# Patient Record
Sex: FEMALE | Race: WHITE | NOT HISPANIC OR LATINO | Employment: UNEMPLOYED | URBAN - NONMETROPOLITAN AREA
[De-identification: names, ages, dates, MRNs, and addresses within clinical notes are randomized per-mention and may not be internally consistent; named-entity substitution may affect disease eponyms.]

---

## 2018-02-14 ENCOUNTER — OFFICE VISIT (OUTPATIENT)
Dept: FAMILY MEDICINE CLINIC | Facility: CLINIC | Age: 53
End: 2018-02-14

## 2018-02-14 VITALS
RESPIRATION RATE: 18 BRPM | HEART RATE: 80 BPM | DIASTOLIC BLOOD PRESSURE: 82 MMHG | SYSTOLIC BLOOD PRESSURE: 118 MMHG | TEMPERATURE: 97.8 F | BODY MASS INDEX: 28.51 KG/M2 | HEIGHT: 64 IN | WEIGHT: 167 LBS | OXYGEN SATURATION: 98 %

## 2018-02-14 DIAGNOSIS — F41.9 ANXIETY: ICD-10-CM

## 2018-02-14 DIAGNOSIS — Z12.39 SCREENING FOR BREAST CANCER: ICD-10-CM

## 2018-02-14 DIAGNOSIS — F33.1 MODERATE EPISODE OF RECURRENT MAJOR DEPRESSIVE DISORDER (HCC): Primary | ICD-10-CM

## 2018-02-14 DIAGNOSIS — R53.83 FATIGUE, UNSPECIFIED TYPE: ICD-10-CM

## 2018-02-14 DIAGNOSIS — Z76.89 ENCOUNTER TO ESTABLISH CARE: ICD-10-CM

## 2018-02-14 DIAGNOSIS — Z13.220 SCREENING FOR HYPERCHOLESTEROLEMIA: ICD-10-CM

## 2018-02-14 DIAGNOSIS — Z12.4 SCREENING FOR CERVICAL CANCER: ICD-10-CM

## 2018-02-14 PROCEDURE — 99204 OFFICE O/P NEW MOD 45 MIN: CPT | Performed by: NURSE PRACTITIONER

## 2018-02-14 RX ORDER — BUSPIRONE HYDROCHLORIDE 5 MG/1
5 TABLET ORAL 3 TIMES DAILY
Qty: 90 TABLET | Refills: 2 | Status: SHIPPED | OUTPATIENT
Start: 2018-02-14 | End: 2018-08-07 | Stop reason: SDUPTHER

## 2018-02-14 RX ORDER — ESCITALOPRAM OXALATE 10 MG/1
10 TABLET ORAL DAILY
Qty: 30 TABLET | Refills: 1 | Status: SHIPPED | OUTPATIENT
Start: 2018-02-14 | End: 2018-04-09 | Stop reason: SDUPTHER

## 2018-02-15 ENCOUNTER — APPOINTMENT (OUTPATIENT)
Dept: LAB | Facility: HOSPITAL | Age: 53
End: 2018-02-15

## 2018-02-15 LAB
ALBUMIN SERPL-MCNC: 4.4 G/DL (ref 3.4–4.8)
ALBUMIN/GLOB SERPL: 1.4 G/DL (ref 1.1–1.8)
ALP SERPL-CCNC: 108 U/L (ref 38–126)
ALT SERPL W P-5'-P-CCNC: 28 U/L (ref 9–52)
ANION GAP SERPL CALCULATED.3IONS-SCNC: 14 MMOL/L (ref 5–15)
ARTICHOKE IGE QN: 95 MG/DL (ref 1–129)
AST SERPL-CCNC: 22 U/L (ref 14–36)
BASOPHILS # BLD AUTO: 0.01 10*3/MM3 (ref 0–0.2)
BASOPHILS NFR BLD AUTO: 0.2 % (ref 0–2)
BILIRUB SERPL-MCNC: 0.6 MG/DL (ref 0.2–1.3)
BUN BLD-MCNC: 13 MG/DL (ref 7–21)
BUN/CREAT SERPL: 23.6 (ref 7–25)
CALCIUM SPEC-SCNC: 9.8 MG/DL (ref 8.4–10.2)
CHLORIDE SERPL-SCNC: 102 MMOL/L (ref 95–110)
CHOLEST SERPL-MCNC: 172 MG/DL (ref 0–199)
CO2 SERPL-SCNC: 24 MMOL/L (ref 22–31)
CREAT BLD-MCNC: 0.55 MG/DL (ref 0.5–1)
DEPRECATED RDW RBC AUTO: 41.2 FL (ref 36.4–46.3)
EOSINOPHIL # BLD AUTO: 0.12 10*3/MM3 (ref 0–0.7)
EOSINOPHIL NFR BLD AUTO: 2.1 % (ref 0–7)
ERYTHROCYTE [DISTWIDTH] IN BLOOD BY AUTOMATED COUNT: 13 % (ref 11.5–14.5)
GFR SERPL CREATININE-BSD FRML MDRD: 116 ML/MIN/1.73 (ref 51–120)
GLOBULIN UR ELPH-MCNC: 3.1 GM/DL (ref 2.3–3.5)
GLUCOSE BLD-MCNC: 108 MG/DL (ref 60–100)
HCT VFR BLD AUTO: 44.1 % (ref 35–45)
HDLC SERPL-MCNC: 61 MG/DL (ref 60–200)
HGB BLD-MCNC: 15.3 G/DL (ref 12–15.5)
IMM GRANULOCYTES # BLD: 0.01 10*3/MM3 (ref 0–0.02)
IMM GRANULOCYTES NFR BLD: 0.2 % (ref 0–0.5)
LDLC/HDLC SERPL: 1.59 {RATIO} (ref 0–3.22)
LYMPHOCYTES # BLD AUTO: 1.94 10*3/MM3 (ref 0.6–4.2)
LYMPHOCYTES NFR BLD AUTO: 33.4 % (ref 10–50)
MCH RBC QN AUTO: 30.4 PG (ref 26.5–34)
MCHC RBC AUTO-ENTMCNC: 34.7 G/DL (ref 31.4–36)
MCV RBC AUTO: 87.5 FL (ref 80–98)
MONOCYTES # BLD AUTO: 0.27 10*3/MM3 (ref 0–0.9)
MONOCYTES NFR BLD AUTO: 4.6 % (ref 0–12)
NEUTROPHILS # BLD AUTO: 3.46 10*3/MM3 (ref 2–8.6)
NEUTROPHILS NFR BLD AUTO: 59.5 % (ref 37–80)
PLATELET # BLD AUTO: 302 10*3/MM3 (ref 150–450)
PMV BLD AUTO: 10.1 FL (ref 8–12)
POTASSIUM BLD-SCNC: 4.3 MMOL/L (ref 3.5–5.1)
PROT SERPL-MCNC: 7.5 G/DL (ref 6.3–8.6)
RBC # BLD AUTO: 5.04 10*6/MM3 (ref 3.77–5.16)
SODIUM BLD-SCNC: 140 MMOL/L (ref 137–145)
TRIGL SERPL-MCNC: 70 MG/DL (ref 20–199)
TSH SERPL DL<=0.05 MIU/L-ACNC: 0.96 MIU/ML (ref 0.46–4.68)
WBC NRBC COR # BLD: 5.81 10*3/MM3 (ref 3.2–9.8)

## 2018-02-15 PROCEDURE — 80050 GENERAL HEALTH PANEL: CPT | Performed by: NURSE PRACTITIONER

## 2018-02-15 PROCEDURE — 80061 LIPID PANEL: CPT | Performed by: NURSE PRACTITIONER

## 2018-02-16 ENCOUNTER — TELEPHONE (OUTPATIENT)
Dept: FAMILY MEDICINE CLINIC | Facility: CLINIC | Age: 53
End: 2018-02-16

## 2018-02-16 NOTE — TELEPHONE ENCOUNTER
Per DHARMESH Méndez, Natividad Montgomery was called and given recent lab results.  Continue current meds and follow up as planned or sooner if any problems.

## 2018-02-27 ENCOUNTER — OFFICE VISIT (OUTPATIENT)
Dept: OBSTETRICS AND GYNECOLOGY | Facility: CLINIC | Age: 53
End: 2018-02-27

## 2018-02-27 VITALS
BODY MASS INDEX: 39.11 KG/M2 | WEIGHT: 169 LBS | HEIGHT: 55 IN | DIASTOLIC BLOOD PRESSURE: 70 MMHG | SYSTOLIC BLOOD PRESSURE: 124 MMHG

## 2018-02-27 DIAGNOSIS — Z01.419 WELL WOMAN EXAM WITH ROUTINE GYNECOLOGICAL EXAM: Primary | ICD-10-CM

## 2018-02-27 PROCEDURE — 88142 CYTOPATH C/V THIN LAYER: CPT | Performed by: OBSTETRICS & GYNECOLOGY

## 2018-02-27 PROCEDURE — 87624 HPV HI-RISK TYP POOLED RSLT: CPT | Performed by: OBSTETRICS & GYNECOLOGY

## 2018-02-27 PROCEDURE — 99386 PREV VISIT NEW AGE 40-64: CPT | Performed by: OBSTETRICS & GYNECOLOGY

## 2018-02-27 RX ORDER — OMEPRAZOLE 40 MG/1
40 CAPSULE, DELAYED RELEASE ORAL
COMMUNITY
End: 2018-06-01 | Stop reason: SDUPTHER

## 2018-02-27 RX ORDER — VALACYCLOVIR HYDROCHLORIDE 1 G/1
TABLET, FILM COATED ORAL
COMMUNITY
End: 2018-06-05

## 2018-02-27 NOTE — PROGRESS NOTES
Subjective   Natividad Montgomery is a 52 y.o. female.     HPI Comments: Patient presents today for annual gynecologic exam with mammogram  Last annual was 3 years ago , that is the last time she had a pap or mammo and these have all been normal except a spot on her left breast that required f/u imaging.  No Bx required.  LMP: 1999  Supracervical hysterectomy  No longer having menopause related symptoms, had used estradiol previously    Gynecologic Exam   The patient's pertinent negatives include no vaginal discharge. Pertinent negatives include no abdominal pain.       The following portions of the patient's history were reviewed and updated as appropriate: allergies, current medications, past family history, past medical history, past social history, past surgical history and problem list.      Review of Systems   Constitutional: Negative for activity change, appetite change and unexpected weight change.   Respiratory: Negative for cough and shortness of breath.    Cardiovascular: Negative for chest pain and palpitations.   Gastrointestinal: Negative for abdominal pain.   Genitourinary: Negative for menstrual problem and vaginal discharge.       Objective   Physical Exam   Constitutional: She is oriented to person, place, and time. She appears well-developed and well-nourished. No distress.   HENT:   Head: Normocephalic and atraumatic.   Right Ear: External ear normal.   Left Ear: External ear normal.   Nose: Nose normal.   Mouth/Throat: Oropharynx is clear and moist. No oropharyngeal exudate.   Eyes: Conjunctivae and EOM are normal. Pupils are equal, round, and reactive to light. Right eye exhibits no discharge. Left eye exhibits no discharge.   Neck: Normal range of motion. Neck supple. No tracheal deviation present. No thyromegaly present.   Cardiovascular: Normal rate, regular rhythm, normal heart sounds and intact distal pulses.  Exam reveals no gallop and no friction rub.    No murmur heard.  Pulmonary/Chest:  Effort normal and breath sounds normal. No respiratory distress. She has no wheezes. She has no rales. She exhibits no mass, no tenderness, no laceration, no deformity and no retraction. Right breast exhibits no inverted nipple, no mass, no nipple discharge, no skin change and no tenderness. Left breast exhibits no inverted nipple, no mass, no nipple discharge, no skin change and no tenderness. Breasts are symmetrical. There is no breast swelling.   Abdominal: Soft. She exhibits no distension and no mass. There is no tenderness. There is no rebound and no guarding. No hernia.   Genitourinary: Vagina normal. No breast tenderness, discharge or bleeding. Pelvic exam was performed with patient supine. No labial fusion. There is no rash, tenderness, lesion or injury on the right labia. There is no rash, tenderness, lesion or injury on the left labia. Cervix exhibits no motion tenderness, no discharge and no friability. Right adnexum displays no mass, no tenderness and no fullness. Left adnexum displays no mass, no tenderness and no fullness. No erythema, tenderness or bleeding in the vagina. No foreign body in the vagina. No signs of injury around the vagina. No vaginal discharge found.   Genitourinary Comments: Pap collected  Uterus is surgically absent  Mild atrophic vaginitis with slight narrowing of vagina   Musculoskeletal: Normal range of motion. She exhibits no edema or deformity.   Neurological: She is alert and oriented to person, place, and time. No cranial nerve deficit. Coordination normal.   Skin: Skin is warm and dry. No rash noted. She is not diaphoretic. No erythema. No pallor.   Psychiatric: She has a normal mood and affect. Her behavior is normal. Judgment and thought content normal.   Vitals reviewed.      Assessment/Plan   Natividad was seen today for gynecologic exam.    Diagnoses and all orders for this visit:    Well woman exam with routine gynecological exam  -     Liquid-based Pap Smear,  Screening       Pap collected  F/u 1 year and PRN

## 2018-03-02 ENCOUNTER — TELEPHONE (OUTPATIENT)
Dept: FAMILY MEDICINE CLINIC | Facility: CLINIC | Age: 53
End: 2018-03-02

## 2018-03-02 LAB
LAB AP CASE REPORT: NORMAL
LAB AP GYN ADDITIONAL INFORMATION: NORMAL
Lab: NORMAL
PATH INTERP SPEC-IMP: NORMAL
STAT OF ADQ CVX/VAG CYTO-IMP: NORMAL

## 2018-03-02 NOTE — TELEPHONE ENCOUNTER
Per DHARMESH Méndez, Natividad Montgomery was called and given recent mammogram results.  Continue current meds and follow up as planned or sooner if any problems.

## 2018-03-06 LAB — HPV I/H RISK 4 DNA CVX QL PROBE+SIG AMP: NEGATIVE

## 2018-03-14 ENCOUNTER — OFFICE VISIT (OUTPATIENT)
Dept: FAMILY MEDICINE CLINIC | Facility: CLINIC | Age: 53
End: 2018-03-14

## 2018-03-14 VITALS
RESPIRATION RATE: 20 BRPM | HEIGHT: 64 IN | HEART RATE: 75 BPM | SYSTOLIC BLOOD PRESSURE: 118 MMHG | OXYGEN SATURATION: 98 % | TEMPERATURE: 98 F | WEIGHT: 170.2 LBS | DIASTOLIC BLOOD PRESSURE: 80 MMHG | BODY MASS INDEX: 29.06 KG/M2

## 2018-03-14 DIAGNOSIS — F32.A ANXIETY AND DEPRESSION: Primary | ICD-10-CM

## 2018-03-14 DIAGNOSIS — K64.0 GRADE I HEMORRHOIDS: ICD-10-CM

## 2018-03-14 DIAGNOSIS — F41.9 ANXIETY AND DEPRESSION: Primary | ICD-10-CM

## 2018-03-14 PROCEDURE — 99214 OFFICE O/P EST MOD 30 MIN: CPT | Performed by: NURSE PRACTITIONER

## 2018-03-14 NOTE — PROGRESS NOTES
Subjective   Natividad Montgomery is a 52 y.o. female.   Four week recheck of depression and anxiety after being placed on Lexapro and Buspar.  Reports she is doing very well on both medications.  Is only having to take her Buspar twice a day.  Has had hemorrhoids intermittently for the last several years but over the past few weeks has been doing a lot of heavy lifting has noticed they have been more irritated.  Has been using hydrocortisone cream and suppositories but still complains of some rectal pain.    Depression   Visit Type: follow-up  Patient presents with the following symptoms: depressed mood and nervousness/anxiety.  Frequency of symptoms: occasionally   Severity: moderate   Sleep quality: good  Nighttime awakenings: occasional  Compliance with medications:  %        Rectal Pain   This is a chronic problem. The current episode started more than 1 year ago. The problem occurs intermittently. The problem has been waxing and waning. Exacerbated by: heavy lifiting. Treatments tried: hydrocortisone cream and suppositories  The treatment provided no relief.        The following portions of the patient's history were reviewed and updated as appropriate: allergies, current medications, past family history, past medical history, past social history, past surgical history and problem list.    Review of Systems   Constitutional: Negative.    Eyes: Negative.    Respiratory: Negative.    Gastrointestinal: Positive for rectal pain.   Genitourinary: Negative.    Musculoskeletal: Negative.    Skin: Negative.    Neurological: Negative.    Psychiatric/Behavioral: The patient is nervous/anxious.        Objective   Physical Exam   Constitutional: She is oriented to person, place, and time. She appears well-developed and well-nourished.   HENT:   Head: Normocephalic.   Right Ear: External ear normal.   Left Ear: External ear normal.   Mouth/Throat: Oropharynx is clear and moist.   Eyes: EOM are normal. Pupils are equal,  round, and reactive to light.   Neck: Normal range of motion. Neck supple.   Cardiovascular: Normal rate, regular rhythm and normal heart sounds.    Pulmonary/Chest: Effort normal and breath sounds normal.   Abdominal: Soft. Bowel sounds are normal.   Genitourinary:         Musculoskeletal: Normal range of motion.   Neurological: She is alert and oriented to person, place, and time.   Skin: Skin is warm and dry. Capillary refill takes less than 2 seconds.   Psychiatric: She has a normal mood and affect. Her behavior is normal. Judgment and thought content normal.   Nursing note and vitals reviewed.      Assessment/Plan   Problems Addressed this Visit     None      Visit Diagnoses     Anxiety and depression    -  Primary    Grade I hemorrhoids        Relevant Medications    hydrocortisone-pramoxine (PROCTOFOAM HC) 1-1 % rectal foam        1.  Anxiety and depression:  Continue on Lexapro as previously prescribed  Continue on BuSpar as previously prescribed  Schedule follow-up appointment with this office in 6 months for recheck    2.  Grade 1 hemorrhoids:  Begin Proctofoam HC 1% rectal foam as prescribed to be used one applicator nightly for 5 days  Encouraged to resume use of stool softener over-the-counter according to package directions  Encouraged increase fluids to help promote hydration and easy passage of stool  Encouraged to schedule media appointment with this office should any rectal bleeding occur        This document has been electronically signed by DHARMESH Garza on March 14, 2018 2:19 PM

## 2018-04-09 DIAGNOSIS — F33.1 MODERATE EPISODE OF RECURRENT MAJOR DEPRESSIVE DISORDER (HCC): ICD-10-CM

## 2018-04-09 RX ORDER — ESCITALOPRAM OXALATE 10 MG/1
10 TABLET ORAL DAILY
Qty: 30 TABLET | Refills: 5 | Status: SHIPPED | OUTPATIENT
Start: 2018-04-09 | End: 2018-04-10 | Stop reason: SDUPTHER

## 2018-04-09 RX ORDER — ESCITALOPRAM OXALATE 10 MG/1
10 TABLET ORAL DAILY
Qty: 30 TABLET | Refills: 1 | Status: CANCELLED | OUTPATIENT
Start: 2018-04-09

## 2018-04-10 DIAGNOSIS — F33.1 MODERATE EPISODE OF RECURRENT MAJOR DEPRESSIVE DISORDER (HCC): ICD-10-CM

## 2018-04-10 RX ORDER — ESCITALOPRAM OXALATE 10 MG/1
10 TABLET ORAL DAILY
Qty: 30 TABLET | Refills: 1 | Status: SHIPPED | OUTPATIENT
Start: 2018-04-10 | End: 2018-08-15

## 2018-04-10 RX ORDER — ESCITALOPRAM OXALATE 10 MG/1
10 TABLET ORAL DAILY
Qty: 30 TABLET | Refills: 5 | Status: SHIPPED | OUTPATIENT
Start: 2018-04-10 | End: 2018-06-01 | Stop reason: SDUPTHER

## 2018-06-01 ENCOUNTER — OFFICE VISIT (OUTPATIENT)
Dept: FAMILY MEDICINE CLINIC | Facility: CLINIC | Age: 53
End: 2018-06-01

## 2018-06-01 VITALS
WEIGHT: 171.4 LBS | HEIGHT: 64 IN | DIASTOLIC BLOOD PRESSURE: 80 MMHG | SYSTOLIC BLOOD PRESSURE: 118 MMHG | BODY MASS INDEX: 29.26 KG/M2

## 2018-06-01 DIAGNOSIS — M25.861 CYST OF RIGHT KNEE JOINT: Primary | ICD-10-CM

## 2018-06-01 DIAGNOSIS — K21.9 GASTROESOPHAGEAL REFLUX DISEASE WITHOUT ESOPHAGITIS: ICD-10-CM

## 2018-06-01 PROCEDURE — 99214 OFFICE O/P EST MOD 30 MIN: CPT | Performed by: NURSE PRACTITIONER

## 2018-06-01 RX ORDER — OMEPRAZOLE 40 MG/1
40 CAPSULE, DELAYED RELEASE ORAL DAILY
Qty: 90 CAPSULE | Refills: 3 | Status: SHIPPED | OUTPATIENT
Start: 2018-06-01 | End: 2019-05-09 | Stop reason: SDUPTHER

## 2018-06-01 NOTE — PROGRESS NOTES
"Subjective   Natividad Montgomery is a 53 y.o. female.   Complains of right knee pain.  Onset of symptoms 2 weeks ago and now has noticed a \"knot on my knee.\"  Also complains of increasing reflux over the last several years.  Has taken Prilosec in the past but her insurance stopped paying for it.  Would like to try it again.      Knee Pain    The incident occurred more than 1 week ago. The incident occurred at home. There was no injury mechanism. The pain is present in the right knee. The pain is at a severity of 0/10. The patient is experiencing no pain. The pain has been fluctuating since onset. Pertinent negatives include no inability to bear weight. Nothing aggravates the symptoms. She has tried nothing for the symptoms. The treatment provided no relief.   Heartburn   She complains of globus sensation, heartburn and water brash. This is a chronic problem. The current episode started more than 1 year ago. The problem occurs frequently. The problem has been waxing and waning. The heartburn duration is several minutes. The heartburn is located in the substernum. The heartburn is of moderate intensity. The heartburn wakes her from sleep. The heartburn doesn't change with position. The symptoms are aggravated by certain foods and lying down. Pertinent negatives include no orthopnea. Risk factors include lack of exercise and smoking/tobacco exposure. She has tried a PPI and an antacid for the symptoms. The treatment provided moderate relief.        The following portions of the patient's history were reviewed and updated as appropriate: allergies, current medications, past family history, past medical history, past social history, past surgical history and problem list.    Review of Systems   Constitutional: Negative.    Respiratory: Negative.    Cardiovascular: Negative.    Gastrointestinal: Positive for heartburn.        Reflux   Genitourinary: Negative.    Musculoskeletal: Negative.         Knot on right knee   Skin: " Negative.        Objective   Physical Exam   Constitutional: She is oriented to person, place, and time. She appears well-developed and well-nourished.   HENT:   Head: Normocephalic.   Right Ear: External ear normal.   Left Ear: External ear normal.   Eyes: Pupils are equal, round, and reactive to light.   Neck: Normal range of motion.   Cardiovascular: Normal rate, regular rhythm and normal heart sounds.    Pulmonary/Chest: Effort normal and breath sounds normal.   Abdominal: Soft. Bowel sounds are normal.   Musculoskeletal: Normal range of motion. She exhibits no edema or deformity.        Legs:  Neurological: She is alert and oriented to person, place, and time.   Skin: Skin is warm. Capillary refill takes less than 2 seconds.   Psychiatric: She has a normal mood and affect. Her behavior is normal.   Nursing note and vitals reviewed.      Assessment/Plan   Problems Addressed this Visit     None      Visit Diagnoses     Cyst of right knee joint    -  Primary    Relevant Orders    Ambulatory Referral to Orthopedic Surgery    Gastroesophageal reflux disease without esophagitis        Relevant Medications    omeprazole (priLOSEC) 40 MG capsule        1.  Cyst of right knee joint:  Ambulatory referral placed to orthopedics and will call to schedule an apportionment  Encouraged to elevate extremity when not ambulating     2.  GERD without esophagitis:  Begin Prilosec 40 mg as prescribed to be taken one capsule by mouth daily  Encouraged to reduce fried, fatty, greasy and spicy food in diet to help reduce GI discomfort  Encouraged to elevate the head of her bed 15 degrees to reduce nighttime symptoms  Discussed possibility of referral to gastroenterology if no improvement in symptoms     Continue on current medications as previously prescribed   Schedule follow-up appointment with this office in three months for recheck         This document has been electronically signed by DHARMESH Garza on June 21, 2018 12:21 PM

## 2018-06-04 ENCOUNTER — TELEPHONE (OUTPATIENT)
Dept: FAMILY MEDICINE CLINIC | Facility: CLINIC | Age: 53
End: 2018-06-04

## 2018-06-04 NOTE — TELEPHONE ENCOUNTER
Per DHARMESH Méndez, Natividad Montgomery was called and given recent XRay results. Patient states she has appt. With orthopedics on 06/05/18.Continue current meds and follow up as planned or sooner if any problems.

## 2018-06-05 ENCOUNTER — OFFICE VISIT (OUTPATIENT)
Dept: ORTHOPEDIC SURGERY | Facility: CLINIC | Age: 53
End: 2018-06-05

## 2018-06-05 VITALS — WEIGHT: 169.4 LBS | HEIGHT: 64 IN | BODY MASS INDEX: 28.92 KG/M2

## 2018-06-05 DIAGNOSIS — M25.861 CYST OF RIGHT KNEE JOINT: Primary | ICD-10-CM

## 2018-06-05 PROBLEM — M25.561 RIGHT KNEE PAIN: Status: ACTIVE | Noted: 2018-06-05

## 2018-06-05 PROCEDURE — 20610 DRAIN/INJ JOINT/BURSA W/O US: CPT | Performed by: NURSE PRACTITIONER

## 2018-06-05 PROCEDURE — 99214 OFFICE O/P EST MOD 30 MIN: CPT | Performed by: NURSE PRACTITIONER

## 2018-06-05 RX ADMIN — LIDOCAINE HYDROCHLORIDE 1 ML: 10 INJECTION, SOLUTION INFILTRATION; PERINEURAL at 13:49

## 2018-06-05 RX ADMIN — TRIAMCINOLONE ACETONIDE 40 MG: 40 INJECTION, SUSPENSION INTRA-ARTICULAR; INTRAMUSCULAR at 13:49

## 2018-06-05 NOTE — PROGRESS NOTES
"Natividad Montgomery is a 53 y.o. female   Primary provider:  No Known Provider       Chief Complaint   Patient presents with   • Right Knee - Pain       HISTORY OF PRESENT ILLNESS:     53-year-old female patient presents to office for evaluation of mass to right anterior knee. Mass has been present for several months and has grown larger in size. No know injury. Patient denies any pain, but states the cyst is unsightly and it bothers her being there. Patient was evaluated by primary care provider, DHARMESH Méndez, on 6/1/2018 with x-rays done of the right knee and referred to orthopedics. The patient has not tried any treatments for the mass. Nothing worsens her symptoms.       Pain   This is a chronic problem. The current episode started more than 1 month ago (several months ago). The problem occurs constantly. The problem has been gradually worsening. Associated symptoms include joint swelling. Associated symptoms comments: Swelling. Nothing aggravates the symptoms. She has tried nothing for the symptoms.        CONCURRENT MEDICAL HISTORY:    Past Medical History:   Diagnosis Date   • Allergic    • Anxiety    • Bronchitis    • Colon polyp    • Depression    • GERD (gastroesophageal reflux disease)    • Headache    • Strep throat    • Urinary tract infection        Allergies   Allergen Reactions   • Latex Itching   • Sulfa Antibiotics Other (See Comments)     \"Run a high fever\"         Current Outpatient Prescriptions:   •  busPIRone (BUSPAR) 5 MG tablet, Take 1 tablet by mouth 3 (Three) Times a Day., Disp: 90 tablet, Rfl: 2  •  Docusate Sodium (COLACE PO), Take  by mouth., Disp: , Rfl:   •  escitalopram (LEXAPRO) 10 MG tablet, TAKE 1 TABLET BY MOUTH DAILY., Disp: 30 tablet, Rfl: 1  •  Fexofenadine HCl (ALLEGRA PO), Take  by mouth., Disp: , Rfl:   •  IBUPROFEN PO, Take  by mouth., Disp: , Rfl:   •  omeprazole (priLOSEC) 40 MG capsule, Take 1 capsule by mouth Daily., Disp: 90 capsule, Rfl: 3    Past Surgical History: " "  Procedure Laterality Date   •  SECTION     • COLONOSCOPY     • PARTIAL HYSTERECTOMY     • SHOULDER SURGERY      right shoulder   • TONSILLECTOMY     • VAGINAL DILATATION      age 15       Family History   Problem Relation Age of Onset   • Heart disease Mother    • Hypertension Mother    • Lung cancer Father    • Hypertension Father    • Thyroid disease Sister    • Hyperlipidemia Sister    • Hypertension Sister    • Thyroid disease Brother    • Hypertension Brother    • Hyperlipidemia Brother    • No Known Problems Daughter    • Heart failure Maternal Grandmother    • Heart attack Maternal Grandfather    • Heart disease Paternal Grandmother    • No Known Problems Paternal Grandfather    • Hypertension Sister    • Hypertension Sister    • Deep vein thrombosis Other    • Diabetes Other    • Cancer Other        Social History     Social History   • Marital status: Single     Spouse name: N/A   • Number of children: N/A   • Years of education: N/A     Occupational History   • Not on file.     Social History Main Topics   • Smoking status: Current Every Day Smoker     Packs/day: 0.10     Types: Cigarettes   • Smokeless tobacco: Never Used   • Alcohol use Yes      Comment: occassionally   • Drug use: No   • Sexual activity: Not Currently     Birth control/ protection: Surgical     Other Topics Concern   • Not on file     Social History Narrative   • No narrative on file        Review of Systems   Constitutional: Negative.    HENT: Negative.    Eyes: Negative.    Respiratory: Negative.    Cardiovascular: Negative.    Gastrointestinal: Negative.    Endocrine: Negative.    Genitourinary: Negative.    Musculoskeletal: Positive for joint swelling.        Mass to right anterior knee.    Skin: Negative.    Allergic/Immunologic: Negative.    Neurological: Negative.    Hematological: Negative.    Psychiatric/Behavioral: Negative.        PHYSICAL EXAMINATION:       Ht 162.6 cm (64\")   Wt 76.8 kg (169 lb 6.4 oz)   " [facility] 01/01/1999 (Approximate)   BMI 29.08 kg/m²     Physical Exam   Constitutional: She is oriented to person, place, and time. Vital signs are normal. She appears well-developed and well-nourished. She is active and cooperative. She does not appear ill. No distress.   HENT:   Head: Normocephalic.   Pulmonary/Chest: Effort normal. No respiratory distress.   Abdominal: Soft. She exhibits no distension.   Musculoskeletal: She exhibits edema (Mass (cyst) right anterior knee). She exhibits no tenderness or deformity.        Right knee: She exhibits no effusion.        Left knee: She exhibits no effusion.   Neurological: She is alert and oriented to person, place, and time. GCS eye subscore is 4. GCS verbal subscore is 5. GCS motor subscore is 6.   Skin: Skin is warm, dry and intact. Capillary refill takes less than 2 seconds. No erythema.   Psychiatric: She has a normal mood and affect. Her speech is normal and behavior is normal. Judgment and thought content normal. Cognition and memory are normal.   Vitals reviewed.      GAIT:     [x]  Normal  []  Antalgic    Assistive device: [x]  None  []  Walker     []  Crutches  []  Cane     []  Wheelchair  []  Stretcher    Right Knee Exam     Tenderness   The patient is experiencing no tenderness.         Range of Motion   The patient has normal right knee ROM.    Muscle Strength     The patient has normal right knee strength.    Tests   Olga:  Medial - negative Lateral - negative  Varus: negative  Valgus: negative    Other   Erythema: absent  Sensation: normal  Pulse: present  Swelling: mild (localized to mass/cyst to anterior right kne)  Other tests: no effusion present    Comments:  Round mass, approximately 3 cm in diameter, noted to anterior aspect of knee consistent with ganglion cyst. Consistent with round, fluid-filled cyst. Cyst is well-circumscribed and nontender. No erythema. No fluctuance. No signs of infection.       Left Knee Exam     Tenderness   The patient is  experiencing no tenderness.         Range of Motion   The patient has normal left knee ROM.    Muscle Strength     The patient has normal left knee strength.    Tests   Olga:  Medial - negative Lateral - negative  Varus: negative  Valgus: negative    Other   Erythema: absent  Sensation: normal  Pulse: present  Swelling: none  Effusion: no effusion present            Xr Knee 4+ Vw Right    Result Date: 6/1/2018  Narrative: Four view right knee HISTORY: Right knee cyst. AP, lateral, tunnel and oblique views obtained. COMPARISON: None No fracture or dislocation. Very small suprapatellar effusion. No other osseous or articular abnormality.     Impression: CONCLUSION: Very small suprapatellar effusion. [id number] Electronically signed by:  Suresh Durbin MD  6/1/2018 5:37 PM [employer] Workstation: [id number]        ASSESSMENT:    Diagnoses and all orders for this visit:    Cyst of right knee joint  -     Large Joint Arthrocentesis    Large Joint Arthrocentesis  Date/Time: 6/5/2018 1:49 PM  Consent given by: patient  Site marked: site marked  Timeout: Immediately prior to procedure a time out was called to verify the correct patient, procedure, equipment, support staff and site/side marked as required   Supporting Documentation  Indications: joint swelling   Procedure Details  Location: knee - R knee (Cyst to anterior knee)  Preparation: Patient was prepped and draped in the usual sterile fashion  Needle size: 18 G  Approach: anterolateral  Medications administered: 1 mL lidocaine 1 %; 40 mg triamcinolone acetonide 40 MG/ML  Aspirate amount: 5 mL  Aspirate: clear  Patient tolerance: patient tolerated the procedure well with no immediate complications      PLAN    X-rays of right knee done on 6/1/2018 reviewed today. Patient is not having any pain or tenderness in the right knee. She has a fluid-filled cyst to the anterior knee, most consistent with a ganglion cyst on exam. Recommend aspiration of the cyst and injection of it  with steroid today. Discussed with patient the high rate of recurrence. Patient verbalizes understanding and wants to proceed. She is mostly concerned with how unsightly it is. Recommend consistent dosing of oral NSAIDs, such as Ibuprofen or Aleve. Recommend ice therapy intermittently 3 to 4 times daily for 20 minutes per day. Follow up in 4 weeks or as needed for any new, worsening or persistent symptoms. If cyst returns/enlarges again, patient will likely need surgical consult for excision.     Return in about 4 weeks (around 7/3/2018), or if symptoms worsen or fail to improve.      This document has been electronically signed by DHARMESH Moreno on June 8, 2018 2:53 PM      DHARMESH Moreno

## 2018-06-08 RX ORDER — LIDOCAINE HYDROCHLORIDE 10 MG/ML
1 INJECTION, SOLUTION INFILTRATION; PERINEURAL
Status: COMPLETED | OUTPATIENT
Start: 2018-06-05 | End: 2018-06-05

## 2018-06-08 RX ORDER — TRIAMCINOLONE ACETONIDE 40 MG/ML
40 INJECTION, SUSPENSION INTRA-ARTICULAR; INTRAMUSCULAR
Status: COMPLETED | OUTPATIENT
Start: 2018-06-05 | End: 2018-06-05

## 2018-07-16 ENCOUNTER — OFFICE VISIT (OUTPATIENT)
Dept: ORTHOPEDIC SURGERY | Facility: CLINIC | Age: 53
End: 2018-07-16

## 2018-07-16 VITALS — HEIGHT: 64 IN | WEIGHT: 173 LBS | BODY MASS INDEX: 29.53 KG/M2

## 2018-07-16 DIAGNOSIS — M25.861 CYST OF RIGHT KNEE JOINT: Primary | ICD-10-CM

## 2018-07-16 DIAGNOSIS — G89.29 CHRONIC PAIN OF RIGHT KNEE: ICD-10-CM

## 2018-07-16 DIAGNOSIS — M25.561 CHRONIC PAIN OF RIGHT KNEE: ICD-10-CM

## 2018-07-16 PROCEDURE — 99214 OFFICE O/P EST MOD 30 MIN: CPT | Performed by: NURSE PRACTITIONER

## 2018-07-16 NOTE — PROGRESS NOTES
"Natividad Montgomery is a 53 y.o. female returns for     Chief Complaint   Patient presents with   • Right Knee - Follow-up       HISTORY OF PRESENT ILLNESS: Patient presents to office for follow up of cyst to right anterior knee that has been present for several months and continues to grow larger. It is tender at times. The patient complains of it being unsightly and bothersome. Aspiration and injection of steroid into the cyst at last office visit on 2018 did not improve her symptoms. The patient is interested in excision of the cyst.      CONCURRENT MEDICAL HISTORY:    Past Medical History:   Diagnosis Date   • Allergic    • Anxiety    • Bronchitis    • Colon polyp    • Depression    • GERD (gastroesophageal reflux disease)    • Headache    • Strep throat    • Urinary tract infection        Allergies   Allergen Reactions   • Latex Itching   • Sulfa Antibiotics Other (See Comments)     \"Run a high fever\"         Current Outpatient Prescriptions:   •  busPIRone (BUSPAR) 5 MG tablet, Take 1 tablet by mouth 3 (Three) Times a Day., Disp: 90 tablet, Rfl: 2  •  Docusate Sodium (COLACE PO), Take  by mouth., Disp: , Rfl:   •  escitalopram (LEXAPRO) 10 MG tablet, TAKE 1 TABLET BY MOUTH DAILY., Disp: 30 tablet, Rfl: 1  •  Fexofenadine HCl (ALLEGRA PO), Take  by mouth., Disp: , Rfl:   •  IBUPROFEN PO, Take  by mouth., Disp: , Rfl:   •  omeprazole (priLOSEC) 40 MG capsule, Take 1 capsule by mouth Daily., Disp: 90 capsule, Rfl: 3    Past Surgical History:   Procedure Laterality Date   •  SECTION     • COLONOSCOPY     • PARTIAL HYSTERECTOMY     • SHOULDER SURGERY      right shoulder   • TONSILLECTOMY     • VAGINAL DILATATION      age 15       ROS  No fevers or chills.  No chest pain or shortness of air.  No GI or  disturbances. Right knee pain. Cyst on right knee.     PHYSICAL EXAMINATION:       Ht 162.6 cm (64\")   Wt 78.5 kg (173 lb)   LMP 1999 (Approximate)   BMI 29.70 kg/m²     Physical Exam "   Constitutional: She is oriented to person, place, and time. Vital signs are normal. She appears well-developed and well-nourished. She is active and cooperative. She does not appear ill. No distress.   HENT:   Head: Normocephalic.   Pulmonary/Chest: Effort normal. No respiratory distress.   Abdominal: Soft. She exhibits no distension.   Musculoskeletal: She exhibits edema (Localized to mass/cyst, anterior aspect right knee) and tenderness (Localized to mass/cyst to anterior aspect of right knee). She exhibits no deformity.        Right knee: She exhibits no effusion.        Left knee: She exhibits no effusion.   Neurological: She is alert and oriented to person, place, and time. GCS eye subscore is 4. GCS verbal subscore is 5. GCS motor subscore is 6.   Skin: Skin is warm, dry and intact. Capillary refill takes less than 2 seconds. No erythema.   Psychiatric: She has a normal mood and affect. Her speech is normal and behavior is normal. Judgment and thought content normal. Cognition and memory are normal.   Vitals reviewed.      GAIT:     [x]  Normal  []  Antalgic    Assistive device: [x]  None  []  Walker     []  Crutches  []  Cane     []  Wheelchair  []  Stretcher    Right Knee Exam     Tenderness   Right knee tenderness location: Mild, cyst/mass to anterior knee.    Range of Motion   The patient has normal right knee ROM.    Muscle Strength     The patient has normal right knee strength.    Tests   Olga:  Medial - negative Lateral - negative  Varus: negative  Valgus: negative    Other   Erythema: absent  Sensation: normal  Pulse: present  Swelling: mild (Localized to mass/cyst to anterior knee)  Other tests: no effusion present    Comments:  Round mass, approximately 3 to 4 cm in diameter, noted to anterior aspect of knee consistent with ganglion cyst. Consistent with round, fluid-filled cyst. Cyst is well-circumscribed and nontender. No erythema. No fluctuance. No signs of infection. Mild tenderness with  palpation. Good range of motion in the knee. Stable joint exam.       Left Knee Exam     Tenderness   The patient is experiencing no tenderness.         Range of Motion   The patient has normal left knee ROM.    Muscle Strength     The patient has normal left knee strength.    Tests   Olga:  Medial - negative Lateral - negative  Varus: negative  Valgus: negative    Other   Erythema: absent  Sensation: normal  Pulse: present  Swelling: none  Effusion: no effusion present            Four view right knee     HISTORY: Right knee cyst.     AP, lateral, tunnel and oblique views obtained.     COMPARISON: None     No fracture or dislocation.  Very small suprapatellar effusion.  No other osseous or articular abnormality.     IMPRESSION:  CONCLUSION:  Very small suprapatellar effusion.     Electronically signed by:  Suresh Durbin MD  6/1/2018 5:37 PM CDT  Workstation: [id number]      ASSESSMENT:    Diagnoses and all orders for this visit:    Cyst of right knee joint  -     MRI Knee Right Without Contrast; Future    Chronic pain of right knee  -     MRI Knee Right Without Contrast; Future    PLAN    Fluid-filled cyst to the anterolateral aspect of the right knee remains present and continues to grow larger. It is tender and bothersome to the patient. It has been present for several months. Aspiration and injection with steroid of the cyst did not improve the size of the cyst for any length of time. Patient has also tried consistent dosing of oral NSAIDs and ice therapy with no improvement. Patient is interested in surgical excision of the cyst. Recommend MRI of right knee for further evaluation of the cyst and its structures/origin. Follow up after MRI for results and surgical consult with one of the orthopedic surgeons for possible excision.     Return for follow up after MRI.        This document has been electronically signed by DHARMESH Moreno on July 16, 2018 7:40 PM    DHARMESH Moreno

## 2018-07-19 ENCOUNTER — HOSPITAL ENCOUNTER (OUTPATIENT)
Dept: MRI IMAGING | Facility: HOSPITAL | Age: 53
Discharge: HOME | End: 2018-07-19
Admitting: NURSE PRACTITIONER

## 2018-07-19 DIAGNOSIS — M25.861 CYST OF RIGHT KNEE JOINT: ICD-10-CM

## 2018-07-19 DIAGNOSIS — M25.561 CHRONIC PAIN OF RIGHT KNEE: ICD-10-CM

## 2018-07-19 DIAGNOSIS — G89.29 CHRONIC PAIN OF RIGHT KNEE: ICD-10-CM

## 2018-07-19 PROCEDURE — 73721 MRI JNT OF LWR EXTRE W/O DYE: CPT

## 2018-08-01 ENCOUNTER — OFFICE VISIT (OUTPATIENT)
Dept: ORTHOPEDIC SURGERY | Facility: CLINIC | Age: 53
End: 2018-08-01

## 2018-08-01 VITALS — BODY MASS INDEX: 29.53 KG/M2 | WEIGHT: 173 LBS | HEIGHT: 64 IN

## 2018-08-01 DIAGNOSIS — M25.861 CYST OF RIGHT KNEE JOINT: Primary | ICD-10-CM

## 2018-08-01 PROCEDURE — 99213 OFFICE O/P EST LOW 20 MIN: CPT | Performed by: ORTHOPAEDIC SURGERY

## 2018-08-01 RX ORDER — BUPIVACAINE HCL/0.9 % NACL/PF 0.1 %
2 PLASTIC BAG, INJECTION (ML) EPIDURAL ONCE
Status: CANCELLED | OUTPATIENT
Start: 2018-08-17 | End: 2018-08-17

## 2018-08-01 NOTE — PROGRESS NOTES
"Natividad Montgomery is a 53 y.o. female returns for     Chief Complaint   Patient presents with   • Right Knee - Pain       HISTORY OF PRESENT ILLNESS: Patient is here today for right knee pain. Patient has been seen by DHARMESH Farley for this problem. She would like to discuss surgical options today. She's had attempted aspiration which failed injection failed medication in time it's gotten somewhat bigger.       CONCURRENT MEDICAL HISTORY:    Past Medical History:   Diagnosis Date   • Allergic    • Anxiety    • Bronchitis    • Colon polyp    • Depression    • GERD (gastroesophageal reflux disease)    • Headache    • Strep throat    • Urinary tract infection        Allergies   Allergen Reactions   • Latex Itching   • Sulfa Antibiotics Other (See Comments)     \"Run a high fever\"         Current Outpatient Prescriptions:   •  busPIRone (BUSPAR) 5 MG tablet, Take 1 tablet by mouth 3 (Three) Times a Day., Disp: 90 tablet, Rfl: 2  •  Docusate Sodium (COLACE PO), Take  by mouth., Disp: , Rfl:   •  escitalopram (LEXAPRO) 10 MG tablet, TAKE 1 TABLET BY MOUTH DAILY., Disp: 30 tablet, Rfl: 1  •  Fexofenadine HCl (ALLEGRA PO), Take  by mouth., Disp: , Rfl:   •  IBUPROFEN PO, Take  by mouth., Disp: , Rfl:   •  omeprazole (priLOSEC) 40 MG capsule, Take 1 capsule by mouth Daily., Disp: 90 capsule, Rfl: 3    Past Surgical History:   Procedure Laterality Date   •  SECTION     • COLONOSCOPY     • PARTIAL HYSTERECTOMY     • SHOULDER SURGERY      right shoulder   • TONSILLECTOMY     • VAGINAL DILATATION      age 15       ROS  No fevers or chills.  No chest pain or shortness of air.  No GI or  disturbances.    PHYSICAL EXAMINATION:       Ht 162.6 cm (64\")   Wt 78.5 kg (173 lb)   LMP 1999 (Approximate)   BMI 29.70 kg/m²     Physical Exam   Constitutional: She is oriented to person, place, and time. She appears well-developed and well-nourished.   HENT:   Head: Normocephalic and atraumatic.   Eyes: Pupils are " equal, round, and reactive to light. EOM are normal.   Neck: Neck supple.   Pulmonary/Chest: Effort normal.   Musculoskeletal: Normal range of motion.   Neurological: She is alert and oriented to person, place, and time.   Skin: Skin is warm and dry.   Psychiatric: She has a normal mood and affect. Thought content normal.   Vitals reviewed.      GAIT:     [x]  Normal  []  Antalgic    Assistive device: [x]  None  []  Walker     []  Crutches  []  Cane     []  Wheelchair  []  Stretcher    Ortho Exam   1-2 cm round non-indurated, mass over the kneecap.  Good motion of the knee the knee is not tender no joint line tenderness limits are stable motion is maintained.    Mri Knee Right Without Contrast    Result Date: 7/19/2018  Narrative: MRI right knee without contrast on 7/19/2018 CLINICAL INDICATION: Mass on knee cap for three months, suspected ganglion cyst, status post unsuccessful aspiration TECHNIQUE: Multiplanar, multisequence MR images are obtained throughout the right knee without the administration of contrast. This examination was performed on a 1.5 Lynnette magnet. COMPARISON: Right knee x-ray from 6/1/2018 FINDINGS: There is a rounded prepatellar masslike area with predominantly bright T2 signal and dark T1 signal. This has internal septation and some internal more dark T2 signal. This is superficial to the patellar tendon and does not appear to communicate with the joint space or insinuate within the patellar tendon. This has an appearance most consistent with prepatellar bursitis. This measures approximately 3.1 x 1.1 x 2.2 cm. Ganglion cyst is also possible but felt less likely. No joint effusion is noted. There is a very tiny Baker's cyst. Popliteus tendon is intact and unremarkable. The MCL and lateral collateral ligament complex are intact and unremarkable. The patellar and quadriceps tendons are intact and unremarkable. The ACL and PCL are intact and unremarkable. Bone marrow signal is unremarkable. There  is abnormal horizontal cleavage signal in the posterior horn of the medial meniscus without definite extension to the surface to suggest tear and favor this to be degenerative. The lateral meniscus is intact and unremarkable. Bone marrow signal is unremarkable. No cartilage abnormality is noted.     Impression: 1. Rounded lesion anterior to the upper patellar tendon in the prepatellar soft tissues most consistent with a prepatellar bursitis. Prepatellar ganglion cyst is felt less likely. Would recommend postcontrast imaging to verify lack of central enhancement and exclude synovial cell sarcoma. 2. Tiny Kenney's cyst. 3. Degenerative signal in the posterior horn of the medial meniscus. Electronically signed by:  David Rodriguez  7/19/2018 10:32 PM CDT Workstation: [id number]    I reviewed her MRI scan and agree with findings I do not think it is any squamous cell sarcoma        ASSESSMENT:    Diagnoses and all orders for this visit:    Cyst of right knee joint          PLAN we'll plan excision.  We discussed risks of recurrence, infection, bleeding, need for further surgery, anesthetic complications etc. she understands we will proceed as planned    No Follow-up on file.    Jose David Desai MD

## 2018-08-07 DIAGNOSIS — F41.9 ANXIETY: ICD-10-CM

## 2018-08-07 RX ORDER — BUSPIRONE HYDROCHLORIDE 5 MG/1
5 TABLET ORAL 3 TIMES DAILY
Qty: 90 TABLET | Refills: 2 | Status: SHIPPED | OUTPATIENT
Start: 2018-08-07 | End: 2018-08-15

## 2018-08-15 ENCOUNTER — APPOINTMENT (OUTPATIENT)
Dept: PREADMISSION TESTING | Facility: HOSPITAL | Age: 53
End: 2018-08-15

## 2018-08-15 VITALS
WEIGHT: 169 LBS | BODY MASS INDEX: 28.85 KG/M2 | DIASTOLIC BLOOD PRESSURE: 60 MMHG | SYSTOLIC BLOOD PRESSURE: 100 MMHG | HEIGHT: 64 IN | HEART RATE: 81 BPM | OXYGEN SATURATION: 98 % | RESPIRATION RATE: 16 BRPM

## 2018-08-15 RX ORDER — BUSPIRONE HYDROCHLORIDE 5 MG/1
5 TABLET ORAL 3 TIMES DAILY PRN
COMMUNITY
End: 2018-11-06 | Stop reason: SDUPTHER

## 2018-08-15 RX ORDER — SODIUM CHLORIDE, SODIUM GLUCONATE, SODIUM ACETATE, POTASSIUM CHLORIDE, AND MAGNESIUM CHLORIDE 526; 502; 368; 37; 30 MG/100ML; MG/100ML; MG/100ML; MG/100ML; MG/100ML
1000 INJECTION, SOLUTION INTRAVENOUS CONTINUOUS
Status: CANCELLED | OUTPATIENT
Start: 2018-08-17

## 2018-08-15 RX ORDER — ESCITALOPRAM OXALATE 10 MG/1
10 TABLET ORAL EVERY EVENING
COMMUNITY
End: 2019-10-16 | Stop reason: SDUPTHER

## 2018-08-17 ENCOUNTER — ANESTHESIA EVENT (OUTPATIENT)
Dept: PERIOP | Facility: HOSPITAL | Age: 53
End: 2018-08-17

## 2018-08-17 ENCOUNTER — ANESTHESIA (OUTPATIENT)
Dept: PERIOP | Facility: HOSPITAL | Age: 53
End: 2018-08-17

## 2018-08-17 ENCOUNTER — HOSPITAL ENCOUNTER (OUTPATIENT)
Facility: HOSPITAL | Age: 53
Setting detail: HOSPITAL OUTPATIENT SURGERY
Discharge: HOME | End: 2018-08-17
Attending: ORTHOPAEDIC SURGERY | Admitting: ORTHOPAEDIC SURGERY

## 2018-08-17 VITALS
WEIGHT: 171.74 LBS | HEART RATE: 67 BPM | OXYGEN SATURATION: 98 % | DIASTOLIC BLOOD PRESSURE: 73 MMHG | RESPIRATION RATE: 20 BRPM | TEMPERATURE: 97.8 F | HEIGHT: 64 IN | BODY MASS INDEX: 29.32 KG/M2 | SYSTOLIC BLOOD PRESSURE: 129 MMHG

## 2018-08-17 DIAGNOSIS — M25.861 CYST OF RIGHT KNEE JOINT: ICD-10-CM

## 2018-08-17 PROCEDURE — 25010000002 PROPOFOL 10 MG/ML EMULSION: Performed by: NURSE ANESTHETIST, CERTIFIED REGISTERED

## 2018-08-17 PROCEDURE — 25010000002 DEXAMETHASONE PER 1 MG: Performed by: NURSE ANESTHETIST, CERTIFIED REGISTERED

## 2018-08-17 PROCEDURE — 27345 REMOVAL OF KNEE CYST: CPT | Performed by: ORTHOPAEDIC SURGERY

## 2018-08-17 PROCEDURE — 88304 TISSUE EXAM BY PATHOLOGIST: CPT | Performed by: PATHOLOGY

## 2018-08-17 PROCEDURE — 25010000002 MIDAZOLAM PER 1 MG: Performed by: NURSE ANESTHETIST, CERTIFIED REGISTERED

## 2018-08-17 PROCEDURE — 25010000002 FENTANYL CITRATE (PF) 100 MCG/2ML SOLUTION: Performed by: NURSE ANESTHETIST, CERTIFIED REGISTERED

## 2018-08-17 PROCEDURE — 88305 TISSUE EXAM BY PATHOLOGIST: CPT | Performed by: ORTHOPAEDIC SURGERY

## 2018-08-17 PROCEDURE — 25010000002 ONDANSETRON PER 1 MG: Performed by: NURSE ANESTHETIST, CERTIFIED REGISTERED

## 2018-08-17 RX ORDER — DIPHENHYDRAMINE HYDROCHLORIDE 50 MG/ML
12.5 INJECTION INTRAMUSCULAR; INTRAVENOUS
Status: DISCONTINUED | OUTPATIENT
Start: 2018-08-17 | End: 2018-08-17 | Stop reason: HOSPADM

## 2018-08-17 RX ORDER — BUPIVACAINE HCL/0.9 % NACL/PF 0.1 %
2 PLASTIC BAG, INJECTION (ML) EPIDURAL ONCE
Status: COMPLETED | OUTPATIENT
Start: 2018-08-17 | End: 2018-08-17

## 2018-08-17 RX ORDER — SODIUM CHLORIDE, SODIUM GLUCONATE, SODIUM ACETATE, POTASSIUM CHLORIDE, AND MAGNESIUM CHLORIDE 526; 502; 368; 37; 30 MG/100ML; MG/100ML; MG/100ML; MG/100ML; MG/100ML
1000 INJECTION, SOLUTION INTRAVENOUS CONTINUOUS
Status: DISCONTINUED | OUTPATIENT
Start: 2018-08-17 | End: 2018-08-17 | Stop reason: HOSPADM

## 2018-08-17 RX ORDER — ONDANSETRON 2 MG/ML
4 INJECTION INTRAMUSCULAR; INTRAVENOUS ONCE AS NEEDED
Status: DISCONTINUED | OUTPATIENT
Start: 2018-08-17 | End: 2018-08-17 | Stop reason: HOSPADM

## 2018-08-17 RX ORDER — BACITRACIN 50000 [IU]/1
INJECTION, POWDER, FOR SOLUTION INTRAMUSCULAR AS NEEDED
Status: DISCONTINUED | OUTPATIENT
Start: 2018-08-17 | End: 2018-08-17 | Stop reason: HOSPADM

## 2018-08-17 RX ORDER — LIDOCAINE HYDROCHLORIDE 20 MG/ML
INJECTION, SOLUTION INFILTRATION; PERINEURAL AS NEEDED
Status: DISCONTINUED | OUTPATIENT
Start: 2018-08-17 | End: 2018-08-17 | Stop reason: SURG

## 2018-08-17 RX ORDER — MIDAZOLAM HYDROCHLORIDE 1 MG/ML
INJECTION INTRAMUSCULAR; INTRAVENOUS AS NEEDED
Status: DISCONTINUED | OUTPATIENT
Start: 2018-08-17 | End: 2018-08-17 | Stop reason: SURG

## 2018-08-17 RX ORDER — PROPOFOL 10 MG/ML
VIAL (ML) INTRAVENOUS AS NEEDED
Status: DISCONTINUED | OUTPATIENT
Start: 2018-08-17 | End: 2018-08-17 | Stop reason: SURG

## 2018-08-17 RX ORDER — FLUMAZENIL 0.1 MG/ML
0.2 INJECTION INTRAVENOUS AS NEEDED
Status: DISCONTINUED | OUTPATIENT
Start: 2018-08-17 | End: 2018-08-17 | Stop reason: HOSPADM

## 2018-08-17 RX ORDER — EPHEDRINE SULFATE 50 MG/ML
5 INJECTION, SOLUTION INTRAVENOUS ONCE AS NEEDED
Status: DISCONTINUED | OUTPATIENT
Start: 2018-08-17 | End: 2018-08-17 | Stop reason: HOSPADM

## 2018-08-17 RX ORDER — LABETALOL HYDROCHLORIDE 5 MG/ML
5 INJECTION, SOLUTION INTRAVENOUS
Status: DISCONTINUED | OUTPATIENT
Start: 2018-08-17 | End: 2018-08-17 | Stop reason: HOSPADM

## 2018-08-17 RX ORDER — NALOXONE HCL 0.4 MG/ML
0.2 VIAL (ML) INJECTION AS NEEDED
Status: DISCONTINUED | OUTPATIENT
Start: 2018-08-17 | End: 2018-08-17 | Stop reason: HOSPADM

## 2018-08-17 RX ORDER — FENTANYL CITRATE 50 UG/ML
INJECTION, SOLUTION INTRAMUSCULAR; INTRAVENOUS AS NEEDED
Status: DISCONTINUED | OUTPATIENT
Start: 2018-08-17 | End: 2018-08-17 | Stop reason: SURG

## 2018-08-17 RX ORDER — HYDROCODONE BITARTRATE AND ACETAMINOPHEN 5; 325 MG/1; MG/1
1-2 TABLET ORAL EVERY 4 HOURS PRN
Qty: 30 TABLET | Refills: 0 | Status: SHIPPED | OUTPATIENT
Start: 2018-08-17 | End: 2018-09-17

## 2018-08-17 RX ORDER — DEXAMETHASONE SODIUM PHOSPHATE 4 MG/ML
INJECTION, SOLUTION INTRA-ARTICULAR; INTRALESIONAL; INTRAMUSCULAR; INTRAVENOUS; SOFT TISSUE AS NEEDED
Status: DISCONTINUED | OUTPATIENT
Start: 2018-08-17 | End: 2018-08-17 | Stop reason: SURG

## 2018-08-17 RX ORDER — ACETAMINOPHEN 650 MG/1
650 SUPPOSITORY RECTAL ONCE AS NEEDED
Status: DISCONTINUED | OUTPATIENT
Start: 2018-08-17 | End: 2018-08-17 | Stop reason: HOSPADM

## 2018-08-17 RX ORDER — ATROPINE SULFATE 1 MG/ML
INJECTION, SOLUTION INTRAMUSCULAR; INTRAVENOUS; SUBCUTANEOUS AS NEEDED
Status: DISCONTINUED | OUTPATIENT
Start: 2018-08-17 | End: 2018-08-17 | Stop reason: SURG

## 2018-08-17 RX ORDER — MEPERIDINE HYDROCHLORIDE 50 MG/ML
12.5 INJECTION INTRAMUSCULAR; INTRAVENOUS; SUBCUTANEOUS
Status: DISCONTINUED | OUTPATIENT
Start: 2018-08-17 | End: 2018-08-17 | Stop reason: HOSPADM

## 2018-08-17 RX ORDER — 0.9 % SODIUM CHLORIDE 0.9 %
VIAL (ML) INJECTION AS NEEDED
Status: DISCONTINUED | OUTPATIENT
Start: 2018-08-17 | End: 2018-08-17 | Stop reason: HOSPADM

## 2018-08-17 RX ORDER — ONDANSETRON 2 MG/ML
INJECTION INTRAMUSCULAR; INTRAVENOUS AS NEEDED
Status: DISCONTINUED | OUTPATIENT
Start: 2018-08-17 | End: 2018-08-17 | Stop reason: SURG

## 2018-08-17 RX ORDER — ACETAMINOPHEN 325 MG/1
650 TABLET ORAL ONCE AS NEEDED
Status: DISCONTINUED | OUTPATIENT
Start: 2018-08-17 | End: 2018-08-17 | Stop reason: HOSPADM

## 2018-08-17 RX ADMIN — SODIUM CHLORIDE, SODIUM GLUCONATE, SODIUM ACETATE, POTASSIUM CHLORIDE, AND MAGNESIUM CHLORIDE 1000 ML: 526; 502; 368; 37; 30 INJECTION, SOLUTION INTRAVENOUS at 06:06

## 2018-08-17 RX ADMIN — DEXAMETHASONE SODIUM PHOSPHATE 4 MG: 4 INJECTION, SOLUTION INTRAMUSCULAR; INTRAVENOUS at 08:10

## 2018-08-17 RX ADMIN — PROPOFOL 30 MG: 10 INJECTION, EMULSION INTRAVENOUS at 07:50

## 2018-08-17 RX ADMIN — LIDOCAINE HYDROCHLORIDE 80 MG: 20 INJECTION, SOLUTION INFILTRATION; PERINEURAL at 07:48

## 2018-08-17 RX ADMIN — FENTANYL CITRATE 25 MCG: 50 INJECTION, SOLUTION INTRAMUSCULAR; INTRAVENOUS at 07:54

## 2018-08-17 RX ADMIN — PROPOFOL 120 MG: 10 INJECTION, EMULSION INTRAVENOUS at 07:48

## 2018-08-17 RX ADMIN — FENTANYL CITRATE 25 MCG: 50 INJECTION, SOLUTION INTRAMUSCULAR; INTRAVENOUS at 07:52

## 2018-08-17 RX ADMIN — ONDANSETRON 4 MG: 2 INJECTION INTRAMUSCULAR; INTRAVENOUS at 08:10

## 2018-08-17 RX ADMIN — Medication 2 G: at 07:55

## 2018-08-17 RX ADMIN — MIDAZOLAM 2 MG: 1 INJECTION INTRAMUSCULAR; INTRAVENOUS at 07:39

## 2018-08-17 RX ADMIN — ATROPINE SULFATE 0.5 MG: 1 INJECTION, SOLUTION INTRAMUSCULAR; INTRAVENOUS; SUBCUTANEOUS at 07:47

## 2018-08-17 NOTE — ANESTHESIA POSTPROCEDURE EVALUATION
Patient: Natividad Montgomery    Procedure Summary     Date:  08/17/18 Room / Location:  [facility] OR [address] [place] [address] OR    Anesthesia Start:  0741 Anesthesia Stop:  0828    Procedure:  EXCISION MASS RIGHT KNEE                             (LATEX ALLERGY) (Right ) Diagnosis:       Cyst of right knee joint      (Cyst of right knee joint [M25.861])    Surgeon:  Jose David Desai MD Provider:  Keenan Capellan MD    Anesthesia Type:  general ASA Status:  3          Anesthesia Type: general  Last vitals  BP   113/65 (08/17/18 0601)   Temp   98.1 °F (36.7 °C) (08/17/18 0601)   Pulse   63 (08/17/18 0601)   Resp   18 (08/17/18 0601)     SpO2   98 % (08/17/18 0601)     Post Anesthesia Care and Evaluation    Patient location during evaluation: PACU  Patient participation: complete - patient participated  Level of consciousness: sleepy but conscious  Pain management: adequate  Airway patency: patent  Anesthetic complications: No anesthetic complications  PONV Status: none  Cardiovascular status: acceptable  Respiratory status: acceptable  Hydration status: acceptable

## 2018-08-17 NOTE — OP NOTE
Procedure(s):  EXCISION MASS RIGHT KNEE                             (LATEX ALLERGY)  Procedure Note    Natividad Montgomery  8/17/2018    Pre-op Diagnosis:   Cyst of right knee joint [M25.861]    Post-op Diagnosis:     Post-Op Diagnosis Codes:     * Cyst of right knee joint [M25.861]    Procedure/CPT® Codes:      Procedure(s):  EXCISION MASS RIGHT KNEE                             (LATEX ALLERGY)    Surgeon(s):  Jose David Desai MD    Anesthesia: Gen.    Staff:   Circulator: Estee Lamas RN  Scrub Person: Christa Lewis  Assistant: Kady Tanner MA    Estimated Blood Loss: minimal    Specimens:                ID Type Source Tests Collected by Time   A : mass right knee Tissue Knee, Right TISSUE PATHOLOGY EXAM Jose David Desai MD 8/17/2018 0814         Drains:      Findings: Fluid filled mass with some myxoid tissue.  Suspect bursal origin    Complications: None    Dictation: Indications:53 -year-old female mass in the anterior portion of her knee that gotten slightly larger it's painful she's had attempted aspirations to get rid of it which have been unsuccessful.  She is for excision at this point.  Risks and benefits of recurrence, neurovascular injury, infection, anesthetic complications, neurovascular injury, etc. these of all been discussed and understands we will proceed as planned.    Procedure: After adequate anesthesia was attained the leg was prepped and draped usual sterile fashion.  Timeout was performed prior to procedure.  Longitudinal incision made over the mass.  Large fluid-filled mass was noted entered very thin fluid clear fluid was expressed.  The walls of the cyst was then discovered and excised completely down to the retinaculum.  There was no stalk noted here.  There was some myxoid tissue within the inside of the cystic change.  The whole specimen was sent for pathology.  Tourniquet was let down and bleeding points controlled with electrocautery subcutaneous tissue  closed with interrupted 2-0 Vicryl.  Skin closed with running 4-0 nylon.  Sterile bulky dressing was applied she tolerated the procedure well    Jose David Desai MD  08/17/18  8:36 AM

## 2018-08-17 NOTE — ANESTHESIA PREPROCEDURE EVALUATION
Anesthesia Evaluation     Patient summary reviewed and Nursing notes reviewed   history of anesthetic complications (Difficult intubation for .): difficult airway  NPO Solid Status: > 8 hours  NPO Liquid Status: > 8 hours           Airway   Mallampati: III  TM distance: <3 FB  Neck ROM: full  Anterior and Difficult intubation highly probable  Dental - normal exam     Pulmonary - normal exam    breath sounds clear to auscultation  (+) recent URI resolved,   Cardiovascular - normal exam    Rhythm: regular  Rate: normal    (+) DVT resolved,   (-) murmur, carotid bruits      Neuro/Psych  (+) headaches, psychiatric history Anxiety and Depression,     GI/Hepatic/Renal/Endo    (+) obesity,  GERD well controlled,      Musculoskeletal     Abdominal   (+) obese,    Substance History - negative use     OB/GYN negative ob/gyn ROS         Other   (+) arthritis                     Anesthesia Plan    ASA 3     general     intravenous induction   Anesthetic plan and risks discussed with patient and spouse/significant other.

## 2018-08-17 NOTE — ANESTHESIA PROCEDURE NOTES
Airway  Urgency: elective    Airway not difficult    General Information and Staff    Patient location during procedure: OR  CRNA: AMRICRUZ PURDY    Indications and Patient Condition  Indications for airway management: airway protection    Preoxygenated: yes  Mask difficulty assessment: 0 - not attempted    Final Airway Details  Final airway type: supraglottic airway      Successful airway: I-gel  Size 4    Number of attempts at approach: 1

## 2018-08-17 NOTE — H&P (VIEW-ONLY)
"Natividad Montgomery is a 53 y.o. female returns for     Chief Complaint   Patient presents with   • Right Knee - Pain       HISTORY OF PRESENT ILLNESS: Patient is here today for right knee pain. Patient has been seen by DHARMESH Farley for this problem. She would like to discuss surgical options today. She's had attempted aspiration which failed injection failed medication in time it's gotten somewhat bigger.       CONCURRENT MEDICAL HISTORY:    Past Medical History:   Diagnosis Date   • Allergic    • Anxiety    • Bronchitis    • Colon polyp    • Depression    • GERD (gastroesophageal reflux disease)    • Headache    • Strep throat    • Urinary tract infection        Allergies   Allergen Reactions   • Latex Itching   • Sulfa Antibiotics Other (See Comments)     \"Run a high fever\"         Current Outpatient Prescriptions:   •  busPIRone (BUSPAR) 5 MG tablet, Take 1 tablet by mouth 3 (Three) Times a Day., Disp: 90 tablet, Rfl: 2  •  Docusate Sodium (COLACE PO), Take  by mouth., Disp: , Rfl:   •  escitalopram (LEXAPRO) 10 MG tablet, TAKE 1 TABLET BY MOUTH DAILY., Disp: 30 tablet, Rfl: 1  •  Fexofenadine HCl (ALLEGRA PO), Take  by mouth., Disp: , Rfl:   •  IBUPROFEN PO, Take  by mouth., Disp: , Rfl:   •  omeprazole (priLOSEC) 40 MG capsule, Take 1 capsule by mouth Daily., Disp: 90 capsule, Rfl: 3    Past Surgical History:   Procedure Laterality Date   •  SECTION     • COLONOSCOPY     • PARTIAL HYSTERECTOMY     • SHOULDER SURGERY      right shoulder   • TONSILLECTOMY     • VAGINAL DILATATION      age 15       ROS  No fevers or chills.  No chest pain or shortness of air.  No GI or  disturbances.    PHYSICAL EXAMINATION:       Ht 162.6 cm (64\")   Wt 78.5 kg (173 lb)   LMP 1999 (Approximate)   BMI 29.70 kg/m²     Physical Exam   Constitutional: She is oriented to person, place, and time. She appears well-developed and well-nourished.   HENT:   Head: Normocephalic and atraumatic.   Eyes: Pupils are " equal, round, and reactive to light. EOM are normal.   Neck: Neck supple.   Pulmonary/Chest: Effort normal.   Musculoskeletal: Normal range of motion.   Neurological: She is alert and oriented to person, place, and time.   Skin: Skin is warm and dry.   Psychiatric: She has a normal mood and affect. Thought content normal.   Vitals reviewed.      GAIT:     [x]  Normal  []  Antalgic    Assistive device: [x]  None  []  Walker     []  Crutches  []  Cane     []  Wheelchair  []  Stretcher    Ortho Exam   1-2 cm round non-indurated, mass over the kneecap.  Good motion of the knee the knee is not tender no joint line tenderness limits are stable motion is maintained.    Mri Knee Right Without Contrast    Result Date: 7/19/2018  Narrative: MRI right knee without contrast on 7/19/2018 CLINICAL INDICATION: Mass on knee cap for three months, suspected ganglion cyst, status post unsuccessful aspiration TECHNIQUE: Multiplanar, multisequence MR images are obtained throughout the right knee without the administration of contrast. This examination was performed on a 1.5 Lynnette magnet. COMPARISON: Right knee x-ray from 6/1/2018 FINDINGS: There is a rounded prepatellar masslike area with predominantly bright T2 signal and dark T1 signal. This has internal septation and some internal more dark T2 signal. This is superficial to the patellar tendon and does not appear to communicate with the joint space or insinuate within the patellar tendon. This has an appearance most consistent with prepatellar bursitis. This measures approximately 3.1 x 1.1 x 2.2 cm. Ganglion cyst is also possible but felt less likely. No joint effusion is noted. There is a very tiny Baker's cyst. Popliteus tendon is intact and unremarkable. The MCL and lateral collateral ligament complex are intact and unremarkable. The patellar and quadriceps tendons are intact and unremarkable. The ACL and PCL are intact and unremarkable. Bone marrow signal is unremarkable. There  is abnormal horizontal cleavage signal in the posterior horn of the medial meniscus without definite extension to the surface to suggest tear and favor this to be degenerative. The lateral meniscus is intact and unremarkable. Bone marrow signal is unremarkable. No cartilage abnormality is noted.     Impression: 1. Rounded lesion anterior to the upper patellar tendon in the prepatellar soft tissues most consistent with a prepatellar bursitis. Prepatellar ganglion cyst is felt less likely. Would recommend postcontrast imaging to verify lack of central enhancement and exclude synovial cell sarcoma. 2. Tiny Kenney's cyst. 3. Degenerative signal in the posterior horn of the medial meniscus. Electronically signed by:  David Rodriguez  7/19/2018 10:32 PM CDT Workstation: [id number]    I reviewed her MRI scan and agree with findings I do not think it is any squamous cell sarcoma        ASSESSMENT:    Diagnoses and all orders for this visit:    Cyst of right knee joint          PLAN we'll plan excision.  We discussed risks of recurrence, infection, bleeding, need for further surgery, anesthetic complications etc. she understands we will proceed as planned    No Follow-up on file.    Jose David Desai MD

## 2018-08-20 LAB
LAB AP CASE REPORT: NORMAL
PATH REPORT.FINAL DX SPEC: NORMAL
PATH REPORT.GROSS SPEC: NORMAL

## 2018-08-27 ENCOUNTER — OFFICE VISIT (OUTPATIENT)
Dept: ORTHOPEDIC SURGERY | Facility: CLINIC | Age: 53
End: 2018-08-27

## 2018-08-27 VITALS — BODY MASS INDEX: 30.05 KG/M2 | WEIGHT: 176 LBS | HEIGHT: 64 IN

## 2018-08-27 DIAGNOSIS — M25.861 CYST OF RIGHT KNEE JOINT: Primary | ICD-10-CM

## 2018-08-27 PROCEDURE — 99024 POSTOP FOLLOW-UP VISIT: CPT | Performed by: ORTHOPAEDIC SURGERY

## 2018-08-27 NOTE — PROGRESS NOTES
"Natividad Montgomery is a 53 y.o. female is s/p       Chief Complaint   Patient presents with   • Post-op     Right knee       HISTORY OF PRESENT ILLNESS: Patient is here today s/p excision of mass of right knee. Patient states she only has pain when standing on her knee for long periods of time.        Allergies   Allergen Reactions   • Latex Itching   • Sulfa Antibiotics Other (See Comments)     \"Run a high fever\"         Current Outpatient Prescriptions:   •  busPIRone (BUSPAR) 5 MG tablet, Take 5 mg by mouth 3 (Three) Times a Day As Needed., Disp: , Rfl:   •  Docusate Sodium (COLACE PO), Take  by mouth Daily., Disp: , Rfl:   •  escitalopram (LEXAPRO) 10 MG tablet, Take 10 mg by mouth Every Evening., Disp: , Rfl:   •  Fexofenadine HCl (ALLEGRA PO), Take  by mouth Daily As Needed., Disp: , Rfl:   •  HYDROcodone-acetaminophen (NORCO) 5-325 MG per tablet, Take 1 to 2 tablets by mouth Every 4 (Four) Hours As Needed for Pain., Disp: 30 tablet, Rfl: 0  •  IBUPROFEN PO, Take 200 mg by mouth As Needed (as directed for arthritis pain)., Disp: , Rfl:   •  omeprazole (priLOSEC) 40 MG capsule, Take 1 capsule by mouth Daily., Disp: 90 capsule, Rfl: 3    No fevers or chills.  No nausea or vomiting.      PHYSICAL EXAMINATION:       Natividad Montgomery is a 53 y.o. female    Patient is awake and alert, answers questions appropriately and is in no apparent distress.    GAIT:     []  Normal  []  Antalgic    Assistive device: []  None  []  Walker     []  Crutches  []  Cane     []  Wheelchair  []  Stretcher    Ortho Exam   She has mild swelling of the wound with likely hematoma the wound itself was good there is a little    Erythema around good motion in theassistive aids.    No results found.        ASSESSMENT:    Diagnoses and all orders for this visit:    Cyst of right knee joint          PLAN suture removal today, Steri-Strip, rewrap with an Ace wrap.  Cleansed the wound irrigated with bacteriostatic soaked call with any " problems.  Recheck her in 2 weeks if still has the swelling underneath consider aspiration at that point.,  Sooner with any problems.    No Follow-up on file.    Kady Tanner MA

## 2018-09-10 ENCOUNTER — OFFICE VISIT (OUTPATIENT)
Dept: ORTHOPEDIC SURGERY | Facility: CLINIC | Age: 53
End: 2018-09-10

## 2018-09-10 VITALS — BODY MASS INDEX: 30.05 KG/M2 | WEIGHT: 176 LBS | HEIGHT: 64 IN

## 2018-09-10 DIAGNOSIS — M25.861 CYST OF RIGHT KNEE JOINT: Primary | ICD-10-CM

## 2018-09-10 PROCEDURE — 99024 POSTOP FOLLOW-UP VISIT: CPT | Performed by: ORTHOPAEDIC SURGERY

## 2018-09-10 NOTE — PROGRESS NOTES
"Postop Follow-up    Name:  Natividad Montgomery  Date:  9/10/2018  :  1965    Chief Complaint:    Chief Complaint   Patient presents with   • Right Knee - Post-op     Date of surgery:      Procedure:    History of Present Illness:  Patient here today for post op visit for right knee. Patient states having no pain.  She is much better she overdid it causes swelling a bit but is appreciably better since I saw her last        Current Outpatient Prescriptions:   •  busPIRone (BUSPAR) 5 MG tablet, Take 5 mg by mouth 3 (Three) Times a Day As Needed., Disp: , Rfl:   •  Docusate Sodium (COLACE PO), Take  by mouth Daily., Disp: , Rfl:   •  escitalopram (LEXAPRO) 10 MG tablet, Take 10 mg by mouth Every Evening., Disp: , Rfl:   •  Fexofenadine HCl (ALLEGRA PO), Take  by mouth Daily As Needed., Disp: , Rfl:   •  HYDROcodone-acetaminophen (NORCO) 5-325 MG per tablet, Take 1 to 2 tablets by mouth Every 4 (Four) Hours As Needed for Pain., Disp: 30 tablet, Rfl: 0  •  IBUPROFEN PO, Take 200 mg by mouth As Needed (as directed for arthritis pain)., Disp: , Rfl:   •  omeprazole (priLOSEC) 40 MG capsule, Take 1 capsule by mouth Daily., Disp: 90 capsule, Rfl: 3    Allergies   Allergen Reactions   • Latex Itching   • Sulfa Antibiotics Other (See Comments)     \"Run a high fever\"         Exam:  Vitals:    09/10/18 1502   Weight: 79.8 kg (176 lb)   Height: 162.6 cm (64\")       The patient is awake, alert, and oriented and in no apparent distress.    Right upper extremity:    Left upper extremity:    Right lower extremity: Wound is healing well no sign of infection.  Swelling is much less.  Good motion of the knee.    Left lower extremity:      No results found.      Assessment:  Diagnoses and all orders for this visit:    Cyst of right knee joint          Plan: Sam aragon.  We discussed wearing a kneepad on for a while.  Continue to let it get well.  She'll call with any problems whatsoever        No Follow-up on file.      09/10/18 " at 3:01 PM by Jose David Desai MD

## 2018-09-17 ENCOUNTER — LAB (OUTPATIENT)
Dept: LAB | Facility: HOSPITAL | Age: 53
End: 2018-09-17

## 2018-09-17 ENCOUNTER — OFFICE VISIT (OUTPATIENT)
Dept: FAMILY MEDICINE CLINIC | Facility: CLINIC | Age: 53
End: 2018-09-17

## 2018-09-17 VITALS
WEIGHT: 174.6 LBS | HEIGHT: 64 IN | DIASTOLIC BLOOD PRESSURE: 72 MMHG | BODY MASS INDEX: 29.81 KG/M2 | SYSTOLIC BLOOD PRESSURE: 106 MMHG

## 2018-09-17 DIAGNOSIS — R53.82 CHRONIC FATIGUE: ICD-10-CM

## 2018-09-17 DIAGNOSIS — G47.00 DIFFICULTY FALLING ASLEEP AT NIGHT UNTIL EARLY MORNING HOURS: Primary | ICD-10-CM

## 2018-09-17 LAB
25(OH)D3 SERPL-MCNC: 25 NG/ML (ref 30–100)
BASOPHILS # BLD AUTO: 0.01 10*3/MM3 (ref 0–0.2)
BASOPHILS NFR BLD AUTO: 0.2 % (ref 0–2)
DEPRECATED RDW RBC AUTO: 42.1 FL (ref 36.4–46.3)
EOSINOPHIL # BLD AUTO: 0.14 10*3/MM3 (ref 0–0.7)
EOSINOPHIL NFR BLD AUTO: 2.8 % (ref 0–7)
ERYTHROCYTE [DISTWIDTH] IN BLOOD BY AUTOMATED COUNT: 13 % (ref 11.5–14.5)
HCT VFR BLD AUTO: 41.8 % (ref 35–45)
HGB BLD-MCNC: 14.4 G/DL (ref 12–15.5)
IMM GRANULOCYTES # BLD: 0 10*3/MM3 (ref 0–0.02)
IMM GRANULOCYTES NFR BLD: 0 % (ref 0–0.5)
LYMPHOCYTES # BLD AUTO: 1.98 10*3/MM3 (ref 0.6–4.2)
LYMPHOCYTES NFR BLD AUTO: 39.5 % (ref 10–50)
MCH RBC QN AUTO: 30.8 PG (ref 26.5–34)
MCHC RBC AUTO-ENTMCNC: 34.4 G/DL (ref 31.4–36)
MCV RBC AUTO: 89.5 FL (ref 80–98)
MONOCYTES # BLD AUTO: 0.25 10*3/MM3 (ref 0–0.9)
MONOCYTES NFR BLD AUTO: 5 % (ref 0–12)
NEUTROPHILS # BLD AUTO: 2.63 10*3/MM3 (ref 2–8.6)
NEUTROPHILS NFR BLD AUTO: 52.5 % (ref 37–80)
PLATELET # BLD AUTO: 247 10*3/MM3 (ref 150–450)
PMV BLD AUTO: 9.9 FL (ref 8–12)
RBC # BLD AUTO: 4.67 10*6/MM3 (ref 3.77–5.16)
TSH SERPL DL<=0.05 MIU/L-ACNC: 3.59 MIU/ML (ref 0.46–4.68)
VIT B12 BLD-MCNC: 250 PG/ML (ref 239–931)
WBC NRBC COR # BLD: 5.01 10*3/MM3 (ref 3.2–9.8)

## 2018-09-17 PROCEDURE — 82607 VITAMIN B-12: CPT

## 2018-09-17 PROCEDURE — 82306 VITAMIN D 25 HYDROXY: CPT

## 2018-09-17 PROCEDURE — 84443 ASSAY THYROID STIM HORMONE: CPT

## 2018-09-17 PROCEDURE — 85025 COMPLETE CBC W/AUTO DIFF WBC: CPT

## 2018-09-17 PROCEDURE — 99213 OFFICE O/P EST LOW 20 MIN: CPT | Performed by: NURSE PRACTITIONER

## 2018-09-17 RX ORDER — HYDROXYZINE PAMOATE 50 MG/1
50 CAPSULE ORAL NIGHTLY PRN
Qty: 30 CAPSULE | Refills: 3 | Status: SHIPPED | OUTPATIENT
Start: 2018-09-17 | End: 2019-04-10

## 2018-09-17 NOTE — PROGRESS NOTES
Subjective   Natividad Montgomery is a 53 y.o. female.  Six month recheck.  Has been having trouble for the past several months falling and staying asleep.  Has been complaining of fatigue and daytime tiredness since this started.      Fatigue   This is a chronic problem. The current episode started more than 1 month ago. The problem occurs constantly. The problem has been unchanged. Pertinent negatives include no chills, diaphoresis, fatigue or fever. The symptoms are aggravated by exertion. She has tried rest and relaxation for the symptoms. The treatment provided no relief.   Insomnia   This is a chronic problem. The current episode started more than 1 month ago. The problem occurs constantly. The problem has been unchanged. Pertinent negatives include no chills, diaphoresis, fatigue or fever. Nothing aggravates the symptoms. She has tried relaxation (Melatonin) for the symptoms. The treatment provided no relief.        The following portions of the patient's history were reviewed and updated as appropriate: past family history, past medical history and past social history.    Review of Systems   Constitutional: Negative.  Negative for chills, diaphoresis, fatigue and fever.   HENT: Negative.    Eyes: Negative.    Respiratory: Negative.    Cardiovascular: Negative.    Gastrointestinal: Negative.    Genitourinary: Negative.    Musculoskeletal: Negative.    Skin: Negative.    Neurological: Negative.    Psychiatric/Behavioral: Negative for confusion. The patient has insomnia.        Objective   Physical Exam   Constitutional: She is oriented to person, place, and time. She appears well-developed and well-nourished.   HENT:   Head: Normocephalic.   Right Ear: External ear normal.   Left Ear: External ear normal.   Eyes: Pupils are equal, round, and reactive to light. EOM are normal.   Neck: Normal range of motion. Neck supple.   Cardiovascular: Normal rate, regular rhythm and normal heart sounds.    Pulmonary/Chest:  Effort normal and breath sounds normal.   Abdominal: Soft. Bowel sounds are normal.   Musculoskeletal: Normal range of motion.   Neurological: She is alert and oriented to person, place, and time.   Skin: Skin is warm. Capillary refill takes less than 2 seconds.   Psychiatric: She has a normal mood and affect. Her behavior is normal.   Nursing note and vitals reviewed.      Assessment/Plan   Problems Addressed this Visit     None      Visit Diagnoses     Difficulty falling asleep at night until early morning hours    -  Primary    Relevant Medications    hydrOXYzine (VISTARIL) 50 MG capsule    Chronic fatigue        Relevant Medications    hydrOXYzine (VISTARIL) 50 MG capsule    Other Relevant Orders    CBC & Differential    TSH    Vitamin D 25 hydroxy    Vitamin B12        1. Difficulty falling asleep at night until early morning hours:  Take Vistaril as prescribed.   Educated on possible sedative side effects of medication   Recommended to turn off any stimulating lights that may interfere with sleep.     2.Chronic Fatigue   Take Vistaril as prescribed. Complete labs as ordered.   Encouraged rest periods    Continue on current medications as previously prescribed   Schedule follow up appointment for 3 months or sooner if needed        This document has been electronically signed by DHARMESH Garza on September 17, 2018 2:23 PM

## 2018-09-18 ENCOUNTER — TELEPHONE (OUTPATIENT)
Dept: FAMILY MEDICINE CLINIC | Facility: CLINIC | Age: 53
End: 2018-09-18

## 2018-09-18 DIAGNOSIS — E55.9 VITAMIN D DEFICIENCY: Primary | ICD-10-CM

## 2018-09-18 RX ORDER — ERGOCALCIFEROL 1.25 MG/1
50000 CAPSULE ORAL WEEKLY
Qty: 30 CAPSULE | Refills: 1 | Status: SHIPPED | OUTPATIENT
Start: 2018-09-18 | End: 2019-11-19 | Stop reason: SDUPTHER

## 2018-09-18 NOTE — PROGRESS NOTES
DHARMESH Glasgow, Ms. Montgomery  has been called with her recent lab results & recommendations.  Continue her current medications and follow-up as planned or sooner if any problems.

## 2018-09-18 NOTE — TELEPHONE ENCOUNTER
DHARMESH Glasgow, Ms. Montgomery  has been called with her recent lab results & recommendations.  Continue her current medications and follow-up as planned or sooner if any problems.      ----- Message from DHARMESH Garza sent at 9/18/2018  7:05 AM CDT -----  Her lab work including her CBC were good but her vitamin D level was low.  This could account for some of her fatigue.  I have called in a prescription for vitamin D that she is to take once a week we will recheck her vitamin D level to future visit.  Thank you.

## 2018-10-01 DIAGNOSIS — F33.1 MODERATE EPISODE OF RECURRENT MAJOR DEPRESSIVE DISORDER (HCC): ICD-10-CM

## 2018-10-01 RX ORDER — ESCITALOPRAM OXALATE 10 MG/1
10 TABLET ORAL DAILY
Qty: 30 TABLET | Refills: 5 | Status: SHIPPED | OUTPATIENT
Start: 2018-10-01 | End: 2019-01-10 | Stop reason: SDUPTHER

## 2018-11-06 RX ORDER — BUSPIRONE HYDROCHLORIDE 5 MG/1
5 TABLET ORAL 3 TIMES DAILY PRN
Qty: 90 TABLET | Refills: 0 | Status: SHIPPED | OUTPATIENT
Start: 2018-11-06 | End: 2019-08-06 | Stop reason: SDUPTHER

## 2019-01-10 ENCOUNTER — OFFICE VISIT (OUTPATIENT)
Dept: FAMILY MEDICINE CLINIC | Facility: CLINIC | Age: 54
End: 2019-01-10

## 2019-01-10 VITALS
WEIGHT: 187.2 LBS | HEIGHT: 64 IN | SYSTOLIC BLOOD PRESSURE: 104 MMHG | BODY MASS INDEX: 31.96 KG/M2 | DIASTOLIC BLOOD PRESSURE: 72 MMHG

## 2019-01-10 DIAGNOSIS — G47.00 DIFFICULTY FALLING ASLEEP AT NIGHT UNTIL EARLY MORNING HOURS: ICD-10-CM

## 2019-01-10 DIAGNOSIS — L02.219 ABSCESS, SUPRAPUBIC: ICD-10-CM

## 2019-01-10 DIAGNOSIS — Z23 FLU VACCINE NEED: Primary | ICD-10-CM

## 2019-01-10 PROCEDURE — 99213 OFFICE O/P EST LOW 20 MIN: CPT | Performed by: NURSE PRACTITIONER

## 2019-01-10 PROCEDURE — 90471 IMMUNIZATION ADMIN: CPT | Performed by: NURSE PRACTITIONER

## 2019-01-10 PROCEDURE — 90674 CCIIV4 VAC NO PRSV 0.5 ML IM: CPT | Performed by: NURSE PRACTITIONER

## 2019-01-10 RX ORDER — AMITRIPTYLINE HYDROCHLORIDE 25 MG/1
25 TABLET, FILM COATED ORAL NIGHTLY
Qty: 30 TABLET | Refills: 5 | Status: SHIPPED | OUTPATIENT
Start: 2019-01-10 | End: 2019-10-03

## 2019-01-10 RX ORDER — CEPHALEXIN 500 MG/1
500 CAPSULE ORAL 3 TIMES DAILY
Qty: 30 CAPSULE | Refills: 0 | Status: SHIPPED | OUTPATIENT
Start: 2019-01-10 | End: 2019-04-10

## 2019-01-10 NOTE — PROGRESS NOTES
Subjective   Natividad Montgomery is a 53 y.o. female.  Three month follow-up for insomnia.  Complains of possible ingrown hair to her left, lower abdomen.      Insomnia   This is a chronic problem. The current episode started more than 1 month ago. The problem occurs constantly. The problem has been gradually improving. Associated symptoms include fatigue. Nothing aggravates the symptoms. She has tried relaxation (Melatonin) for the symptoms. The treatment provided no relief.        The following portions of the patient's history were reviewed and updated as appropriate: allergies, current medications, past family history, past medical history, past social history, past surgical history and problem list.    Review of Systems   Constitutional: Positive for fatigue.   HENT: Negative.    Respiratory: Negative.    Cardiovascular: Negative.    Gastrointestinal: Negative.    Genitourinary: Negative.    Skin: Negative.    Psychiatric/Behavioral: Negative for confusion. The patient has insomnia.        Objective   Physical Exam   Constitutional: She is oriented to person, place, and time. She appears well-developed and well-nourished.   Cardiovascular: Normal rate, regular rhythm and normal heart sounds.   Pulmonary/Chest: Effort normal and breath sounds normal.   Abdominal: Soft. Bowel sounds are normal.   Musculoskeletal: Normal range of motion.   Neurological: She is alert and oriented to person, place, and time.   Skin: Skin is warm. Capillary refill takes less than 2 seconds.        Tenderness    Psychiatric: She has a normal mood and affect. Her behavior is normal.   Nursing note and vitals reviewed.      Assessment/Plan   Problems Addressed this Visit     None      Visit Diagnoses     Difficulty falling asleep at night until early morning hours    -  Primary    Relevant Medications    amitriptyline (ELAVIL) 25 MG tablet    Abscess, suprapubic        Relevant Medications    cephalexin (KEFLEX) 500 MG capsule        1.   Difficulty falling asleep at night until early morning hours:  Will decrease Elavil from 50 mg by mouth daily at bedtime to 25 mg by mouth daily at bedtime  Educated on possible sedative side effects of medication   Recommended to turn off any stimulating lights that may interfere with sleep.      2.  Abscess, suprapubic:  Begin Keflex as prescribed be taken 1 by mouth 3 times a day  Cleanse are with arm soap and antibacterial water  Discussed possible referral to general surgery if no improvement or worsening of symptoms    Continue on current medications as previously prescribed   Schedule follow-up appointment in 3 months or may be seen sooner as needed         This document has been electronically signed by DHARMESH Garza on January 10, 2019 1:52 PM

## 2019-04-10 ENCOUNTER — APPOINTMENT (OUTPATIENT)
Dept: LAB | Facility: HOSPITAL | Age: 54
End: 2019-04-10

## 2019-04-10 ENCOUNTER — RESULTS ENCOUNTER (OUTPATIENT)
Dept: FAMILY MEDICINE CLINIC | Facility: CLINIC | Age: 54
End: 2019-04-10

## 2019-04-10 ENCOUNTER — LAB (OUTPATIENT)
Dept: LAB | Facility: HOSPITAL | Age: 54
End: 2019-04-10

## 2019-04-10 ENCOUNTER — OFFICE VISIT (OUTPATIENT)
Dept: FAMILY MEDICINE CLINIC | Facility: CLINIC | Age: 54
End: 2019-04-10

## 2019-04-10 VITALS
BODY MASS INDEX: 31.84 KG/M2 | DIASTOLIC BLOOD PRESSURE: 68 MMHG | HEIGHT: 64 IN | WEIGHT: 186.5 LBS | SYSTOLIC BLOOD PRESSURE: 104 MMHG

## 2019-04-10 DIAGNOSIS — F33.0 MILD EPISODE OF RECURRENT MAJOR DEPRESSIVE DISORDER (HCC): ICD-10-CM

## 2019-04-10 DIAGNOSIS — G47.00 INSOMNIA, UNSPECIFIED TYPE: ICD-10-CM

## 2019-04-10 DIAGNOSIS — Z13.220 SCREENING FOR HYPERLIPIDEMIA: ICD-10-CM

## 2019-04-10 DIAGNOSIS — G47.00 INSOMNIA, UNSPECIFIED TYPE: Primary | ICD-10-CM

## 2019-04-10 DIAGNOSIS — Z12.39 SCREENING FOR BREAST CANCER: ICD-10-CM

## 2019-04-10 DIAGNOSIS — Z12.11 SCREENING FOR COLON CANCER: ICD-10-CM

## 2019-04-10 DIAGNOSIS — Z13.29 SCREENING FOR HYPOTHYROIDISM: ICD-10-CM

## 2019-04-10 PROBLEM — F32.A DEPRESSION: Status: ACTIVE | Noted: 2019-04-10

## 2019-04-10 PROCEDURE — 80050 GENERAL HEALTH PANEL: CPT

## 2019-04-10 PROCEDURE — 99214 OFFICE O/P EST MOD 30 MIN: CPT | Performed by: NURSE PRACTITIONER

## 2019-04-10 PROCEDURE — 80061 LIPID PANEL: CPT

## 2019-04-10 NOTE — PROGRESS NOTES
"Subjective   Natividad Montgomery is a 53 y.o. female.  Three month follow-up for insomnia and depression.  Patient states \"the Vistaril and Elavil together are too much.\"  Patient has stopped taking the Vistaril.  Patient wants to cut Elavil tablet in half.    Insomnia   This is a chronic problem. The current episode started more than 1 month ago. The problem occurs constantly. The problem has been gradually improving. Nothing aggravates the symptoms. She has tried relaxation (Melatonin, Elavil, Vistaril) for the symptoms. The treatment provided significant relief.   Depression   Visit Type: follow-up  Patient presents with the following symptoms: depressed mood, insomnia and nervousness/anxiety.  Patient is not experiencing: confusion.  Frequency of symptoms: occasionally   Severity: moderate   Sleep quality: good  Nighttime awakenings: occasional  Compliance with medications:  %           The following portions of the patient's history were reviewed and updated as appropriate:   Current Outpatient Medications   Medication Sig Dispense Refill   • amitriptyline (ELAVIL) 25 MG tablet Take 1 tablet by mouth Every Night. 30 tablet 5   • busPIRone (BUSPAR) 5 MG tablet Take 1 tablet by mouth 3 (Three) Times a Day As Needed (anxity). 90 tablet 0   • Docusate Sodium (COLACE PO) Take  by mouth Daily.     • escitalopram (LEXAPRO) 10 MG tablet Take 10 mg by mouth Every Evening.     • Fexofenadine HCl (ALLEGRA PO) Take  by mouth Daily As Needed.     • IBUPROFEN PO Take 200 mg by mouth As Needed (as directed for arthritis pain).     • omeprazole (priLOSEC) 40 MG capsule Take 1 capsule by mouth Daily. 90 capsule 3   • vitamin D (ERGOCALCIFEROL) 28030 units capsule capsule Take 1 capsule by mouth 1 (One) Time Per Week. 30 capsule 1     No current facility-administered medications for this visit.      Current Outpatient Medications on File Prior to Visit   Medication Sig   • amitriptyline (ELAVIL) 25 MG tablet Take 1 tablet " "by mouth Every Night.   • busPIRone (BUSPAR) 5 MG tablet Take 1 tablet by mouth 3 (Three) Times a Day As Needed (anxity).   • Docusate Sodium (COLACE PO) Take  by mouth Daily.   • escitalopram (LEXAPRO) 10 MG tablet Take 10 mg by mouth Every Evening.   • Fexofenadine HCl (ALLEGRA PO) Take  by mouth Daily As Needed.   • IBUPROFEN PO Take 200 mg by mouth As Needed (as directed for arthritis pain).   • omeprazole (priLOSEC) 40 MG capsule Take 1 capsule by mouth Daily.   • vitamin D (ERGOCALCIFEROL) 32593 units capsule capsule Take 1 capsule by mouth 1 (One) Time Per Week.   • [DISCONTINUED] cephalexin (KEFLEX) 500 MG capsule Take 1 capsule by mouth 3 (Three) Times a Day. 10d   • [DISCONTINUED] hydrOXYzine (VISTARIL) 50 MG capsule Take 1 capsule by mouth At Night As Needed for Itching.     No current facility-administered medications on file prior to visit.      She is allergic to latex and sulfa antibiotics..    Review of Systems   Constitutional: Negative.    HENT: Negative.    Respiratory: Negative.    Cardiovascular: Negative.    Gastrointestinal: Negative.    Genitourinary: Negative.    Skin: Negative.    Neurological: Negative.    Psychiatric/Behavioral: Negative for confusion. The patient is nervous/anxious and has insomnia.        Objective    Visit Vitals  /68   Ht 162.6 cm (64\")   Wt 84.6 kg (186 lb 8 oz)   LMP 01/01/1999 (Approximate)   BMI 32.01 kg/m²       Physical Exam   Constitutional: She is oriented to person, place, and time. She appears well-developed and well-nourished.   HENT:   Head: Normocephalic.   Right Ear: External ear normal.   Left Ear: External ear normal.   Eyes: EOM are normal. Pupils are equal, round, and reactive to light.   Neck: Normal range of motion. Neck supple.   Cardiovascular: Normal rate, regular rhythm and normal heart sounds.   Pulmonary/Chest: Effort normal and breath sounds normal.   Abdominal: Soft. Bowel sounds are normal.   Musculoskeletal: Normal range of motion. "   Neurological: She is alert and oriented to person, place, and time.   Skin: Skin is warm. Capillary refill takes less than 2 seconds.   Psychiatric: She has a normal mood and affect. Her behavior is normal.   Nursing note and vitals reviewed.      Assessment/Plan   Problems Addressed this Visit        Other    Depression      Other Visit Diagnoses     Insomnia, unspecified type    -  Primary    Relevant Orders    CBC & Differential    Comprehensive Metabolic Panel    Screening for breast cancer        Relevant Orders    Mammo Screening Digital Tomosynthesis Bilateral With CAD    Screening for colon cancer        Relevant Orders    Cologuard - Stool, Per Rectum    Screening for hypothyroidism        Relevant Orders    TSH    Screening for hyperlipidemia        Relevant Orders    Lipid panel        1.  Insomnia:  Instructed to reduce Elavil to 12.5 mg PO nightly  Educated on possible side of this medication including but not limited possible suicidal ideations  Encouraged to discontinue medication immediately if suicidal ideations occur and seek emergency medical treatment    2.  Depression:  Continue Lexapro and Buspar as previously prescribed    3.  Screening for breast cancer:  Radiology will call to schedule bilateral mammogram  Encouraged monthly self breast exams at home    4.  Screening for colon cancer:  Complete Cologuard and will notify of results when available    5.  Screening for hypothyroidism:  Complete TSH and will notify of results when available    6.  Screening for hyperlipidemia:  Complete lipid panel and will notify of results when available    Continue on current medications as previously prescribed   Return in about 6 months (around 10/10/2019) for Recheck.        This document has been electronically signed by DHARMESH Garza on April 10, 2019 2:40 PM

## 2019-04-11 LAB
ALBUMIN SERPL-MCNC: 4.5 G/DL (ref 3.5–5.2)
ALBUMIN/GLOB SERPL: 1.8 G/DL
ALP SERPL-CCNC: 104 U/L (ref 39–117)
ALT SERPL W P-5'-P-CCNC: 12 U/L (ref 1–33)
ANION GAP SERPL CALCULATED.3IONS-SCNC: 12.1 MMOL/L
AST SERPL-CCNC: 12 U/L (ref 1–32)
BASOPHILS # BLD AUTO: 0.01 10*3/MM3 (ref 0–0.2)
BASOPHILS NFR BLD AUTO: 0.2 % (ref 0–1.5)
BILIRUB SERPL-MCNC: 0.4 MG/DL (ref 0.2–1.2)
BUN BLD-MCNC: 13 MG/DL (ref 6–20)
BUN/CREAT SERPL: 24.1 (ref 7–25)
CALCIUM SPEC-SCNC: 9.3 MG/DL (ref 8.6–10.5)
CHLORIDE SERPL-SCNC: 102 MMOL/L (ref 98–107)
CHOLEST SERPL-MCNC: 195 MG/DL (ref 0–200)
CO2 SERPL-SCNC: 27.9 MMOL/L (ref 22–29)
CREAT BLD-MCNC: 0.54 MG/DL (ref 0.57–1)
DEPRECATED RDW RBC AUTO: 42.6 FL (ref 37–54)
EOSINOPHIL # BLD AUTO: 0.18 10*3/MM3 (ref 0–0.4)
EOSINOPHIL NFR BLD AUTO: 3.9 % (ref 0.3–6.2)
ERYTHROCYTE [DISTWIDTH] IN BLOOD BY AUTOMATED COUNT: 13.1 % (ref 12.3–15.4)
GFR SERPL CREATININE-BSD FRML MDRD: 118 ML/MIN/1.73
GLOBULIN UR ELPH-MCNC: 2.5 GM/DL
GLUCOSE BLD-MCNC: 96 MG/DL (ref 65–99)
HCT VFR BLD AUTO: 42.3 % (ref 34–46.6)
HDLC SERPL-MCNC: 62 MG/DL (ref 40–60)
HGB BLD-MCNC: 14.5 G/DL (ref 12–15.9)
IMM GRANULOCYTES # BLD AUTO: 0.01 10*3/MM3 (ref 0–0.05)
IMM GRANULOCYTES NFR BLD AUTO: 0.2 % (ref 0–0.5)
LDLC SERPL CALC-MCNC: 109 MG/DL (ref 0–100)
LDLC/HDLC SERPL: 1.75 {RATIO}
LYMPHOCYTES # BLD AUTO: 1.95 10*3/MM3 (ref 0.7–3.1)
LYMPHOCYTES NFR BLD AUTO: 42.3 % (ref 19.6–45.3)
MCH RBC QN AUTO: 30.7 PG (ref 26.6–33)
MCHC RBC AUTO-ENTMCNC: 34.3 G/DL (ref 31.5–35.7)
MCV RBC AUTO: 89.6 FL (ref 79–97)
MONOCYTES # BLD AUTO: 0.32 10*3/MM3 (ref 0.1–0.9)
MONOCYTES NFR BLD AUTO: 6.9 % (ref 5–12)
NEUTROPHILS # BLD AUTO: 2.14 10*3/MM3 (ref 1.4–7)
NEUTROPHILS NFR BLD AUTO: 46.5 % (ref 42.7–76)
NRBC BLD AUTO-RTO: 0 /100 WBC (ref 0–0)
PLATELET # BLD AUTO: 282 10*3/MM3 (ref 140–450)
PMV BLD AUTO: 11 FL (ref 6–12)
POTASSIUM BLD-SCNC: 4 MMOL/L (ref 3.5–5.2)
PROT SERPL-MCNC: 7 G/DL (ref 6–8.5)
RBC # BLD AUTO: 4.72 10*6/MM3 (ref 3.77–5.28)
SODIUM BLD-SCNC: 142 MMOL/L (ref 136–145)
TRIGL SERPL-MCNC: 121 MG/DL (ref 0–150)
TSH SERPL DL<=0.05 MIU/L-ACNC: 1.97 MIU/ML (ref 0.27–4.2)
VLDLC SERPL-MCNC: 24.2 MG/DL (ref 5–40)
WBC NRBC COR # BLD: 4.61 10*3/MM3 (ref 3.4–10.8)

## 2019-04-16 DIAGNOSIS — E78.2 MIXED HYPERLIPIDEMIA: Primary | ICD-10-CM

## 2019-04-16 RX ORDER — CHLORAL HYDRATE 500 MG
1000 CAPSULE ORAL
Qty: 90 CAPSULE | Refills: 2 | Status: SHIPPED | OUTPATIENT
Start: 2019-04-16 | End: 2020-02-11

## 2019-04-18 ENCOUNTER — TELEPHONE (OUTPATIENT)
Dept: FAMILY MEDICINE CLINIC | Facility: CLINIC | Age: 54
End: 2019-04-18

## 2019-04-18 NOTE — PROGRESS NOTES
Per DHARMESH Chase, Ms. Montgomery has been called with recent lab results & recommendations.  Continue current medications and follow-up as planned or sooner if any problems.

## 2019-04-18 NOTE — TELEPHONE ENCOUNTER
Per DHARMESH Chase, Ms. Montgomery has been called with recent lab results & recommendations.  Continue current medications and follow-up as planned or sooner if any problems.      ----- Message from DHARMESH Garza sent at 4/16/2019  4:13 PM CDT -----  Please call and let her know that her blood count as well as her sugar, sodium, potassium, kidney function and liver function were normal.  Total cholesterol and triglycerides were good but good cholesterol was slightly elevated as well as her bad cholesterol.  She can improve this by adhering to a low-fat diet, increasing her exercises such as walking and I have sent in a prescription for fish oil to her pharmacy.  She will take this daily.  If she prefers she can purchase this over-the-counter but it needs to be 1000 mg.  Thank you.

## 2019-05-07 ENCOUNTER — TELEPHONE (OUTPATIENT)
Dept: FAMILY MEDICINE CLINIC | Facility: CLINIC | Age: 54
End: 2019-05-07

## 2019-05-07 NOTE — TELEPHONE ENCOUNTER
DATE OF PROCEDURE:  01/24/2018      SURGEON:  Jayson Sparks M.D.      PROCEDURE PERFORMED:  Flexible sigmoidoscopy with endoscopic ultrasound and   endoscopic mucosal resection of rectal lesion.      PREOPERATIVE DIAGNOSES:  This is a 58-year-old lady who underwent colonoscopy   with Dr. Lisbet Hinds and was found to have a yellowish nodule in the rectum.   This was resected.  This can back as neuroendocrine tumor or carcinoid with   lateral and deep margins positive.  The patient is now referred for endoscopic   mucosal resection and endoscopic ultrasound.      POSTOPERATIVE DIAGNOSES:  Yellowish scar noted.  EUS revealed mild thickening of   mucosa and submucosa in this area, which can be scarring with residual tumor.   Eleview injected, endoscopic deep white margin endoscopic mucosal resection   performed.      MEDICATION:  MAC.      DESCRIPTION OF PROCEDURE:  With the patient lying in left lateral position,   Olympus radial echoendoscope was introduced into the rectum.  At approximately 8   or 9 cm from anal verge lesion was noted.  A yellowish scar was noted, in the   rectal wall.  This was a small healing scar with yellowish hue, which can be   seen in carcinoid, which was about 4-5 mm abnormal area.  EUS revealed no mass   in this area, mild thickening of mucosa or submucosa as noted.  This is common   after endoscopic polypectomy.  Air was suctioned out.  Scope withdrawn.      Olympus upper endoscope loaded with a cap device was next introduced into the   rectum.  The lesion was examined carefully.  Eleview was injected submucosally   in this region.  __________ was injected in this region.  The lesion did not   lift very well in the middle, however, was suctioning well into the cap.  A   rubber band was placed at the base of lesion to form a polyp, endoscopic mucosal   resection was then performed using a hexagon snare.  This created about 1.5 cm   ulcer.  This was closed using 3 instinct clips.  Air  Natividad said someone called her twice yesterday and she is returning the call    was suctioned out and scope   withdrawn.      RECOMMENDATION:  Await pathology results.  Follow up with Dr. Lisbet Hinds in few   weeks.         _____________________               ____________________________________   DATE AND TIME                       RAIN Michael/MEDJUAN-#[id number]   DD:  01/24/2018 16:03:01      DT:  01/24/2018 19:54:06      cc:   RAIN Ramachandran, [facility]      REPORT OF OPERATION          STORMY ABRAHAM   MRN#: [id number]   ROOM:   ACCT#: [id number] Reports

## 2019-05-09 DIAGNOSIS — K21.9 GASTROESOPHAGEAL REFLUX DISEASE WITHOUT ESOPHAGITIS: ICD-10-CM

## 2019-05-09 RX ORDER — OMEPRAZOLE 40 MG/1
CAPSULE, DELAYED RELEASE ORAL
Qty: 90 CAPSULE | Refills: 1 | Status: SHIPPED | OUTPATIENT
Start: 2019-05-09 | End: 2019-07-09 | Stop reason: SDUPTHER

## 2019-07-09 DIAGNOSIS — K21.9 GASTROESOPHAGEAL REFLUX DISEASE WITHOUT ESOPHAGITIS: ICD-10-CM

## 2019-07-09 RX ORDER — OMEPRAZOLE 40 MG/1
CAPSULE, DELAYED RELEASE ORAL
Qty: 30 CAPSULE | Refills: 1 | Status: SHIPPED | OUTPATIENT
Start: 2019-07-09 | End: 2019-08-29 | Stop reason: SDUPTHER

## 2019-08-02 ENCOUNTER — TELEPHONE (OUTPATIENT)
Dept: FAMILY MEDICINE CLINIC | Facility: CLINIC | Age: 54
End: 2019-08-02

## 2019-08-02 NOTE — TELEPHONE ENCOUNTER
Per DHARMESH Chase, Ms. Montgomery has been called with recent lab results & recommendations.  Continue current medications and follow-up as planned or sooner if any problems.      ----- Message from DHARMESH Garza sent at 8/2/2019 11:22 AM CDT -----  Cologuard negative.  Needs repeat in 3 years.

## 2019-08-06 RX ORDER — BUSPIRONE HYDROCHLORIDE 5 MG/1
TABLET ORAL
Qty: 90 TABLET | Refills: 0 | Status: SHIPPED | OUTPATIENT
Start: 2019-08-06 | End: 2019-08-29 | Stop reason: SDUPTHER

## 2019-08-29 DIAGNOSIS — K21.9 GASTROESOPHAGEAL REFLUX DISEASE WITHOUT ESOPHAGITIS: ICD-10-CM

## 2019-08-29 RX ORDER — OMEPRAZOLE 40 MG/1
CAPSULE, DELAYED RELEASE ORAL
Qty: 30 CAPSULE | Refills: 1 | Status: SHIPPED | OUTPATIENT
Start: 2019-08-29 | End: 2019-10-31 | Stop reason: SDUPTHER

## 2019-08-29 RX ORDER — BUSPIRONE HYDROCHLORIDE 5 MG/1
TABLET ORAL
Qty: 90 TABLET | Refills: 0 | Status: SHIPPED | OUTPATIENT
Start: 2019-08-29 | End: 2020-01-10

## 2019-08-30 ENCOUNTER — OFFICE VISIT (OUTPATIENT)
Dept: ORTHOPEDIC SURGERY | Facility: CLINIC | Age: 54
End: 2019-08-30

## 2019-08-30 VITALS — HEIGHT: 64 IN | BODY MASS INDEX: 30.39 KG/M2 | WEIGHT: 178 LBS

## 2019-08-30 DIAGNOSIS — G89.29 CHRONIC PAIN OF RIGHT KNEE: Primary | ICD-10-CM

## 2019-08-30 DIAGNOSIS — M25.561 CHRONIC PAIN OF RIGHT KNEE: Primary | ICD-10-CM

## 2019-08-30 DIAGNOSIS — M17.11 PATELLOFEMORAL ARTHRITIS OF RIGHT KNEE: ICD-10-CM

## 2019-08-30 PROCEDURE — 99213 OFFICE O/P EST LOW 20 MIN: CPT | Performed by: ORTHOPAEDIC SURGERY

## 2019-08-30 PROCEDURE — 20610 DRAIN/INJ JOINT/BURSA W/O US: CPT | Performed by: ORTHOPAEDIC SURGERY

## 2019-08-30 RX ORDER — TRIAMCINOLONE ACETONIDE 40 MG/ML
80 INJECTION, SUSPENSION INTRA-ARTICULAR; INTRAMUSCULAR
Status: COMPLETED | OUTPATIENT
Start: 2019-08-30 | End: 2019-08-30

## 2019-08-30 RX ORDER — LIDOCAINE HYDROCHLORIDE 10 MG/ML
4 INJECTION, SOLUTION EPIDURAL; INFILTRATION; INTRACAUDAL; PERINEURAL
Status: COMPLETED | OUTPATIENT
Start: 2019-08-30 | End: 2019-08-30

## 2019-08-30 RX ADMIN — LIDOCAINE HYDROCHLORIDE 4 ML: 10 INJECTION, SOLUTION EPIDURAL; INFILTRATION; INTRACAUDAL; PERINEURAL at 13:48

## 2019-08-30 RX ADMIN — TRIAMCINOLONE ACETONIDE 80 MG: 40 INJECTION, SUSPENSION INTRA-ARTICULAR; INTRAMUSCULAR at 13:48

## 2019-08-30 NOTE — PROGRESS NOTES
"Natividad Montgomery is a 54 y.o. female returns for     Chief Complaint   Patient presents with   • Right Knee - Follow-up       HISTORY OF PRESENT ILLNESS: f/u right knee. Mass excision done on 8/17/2018. Patient has grinding in knee when climbing stairs.  She has done well after mass excision but was climbing steps and recently has had increased pain in her knee on the right side.  It is worse with activity somewhat better with rest no real catching buckling or giving way.     CONCURRENT MEDICAL HISTORY:    Past Medical History:   Diagnosis Date   • Allergic    • Anxiety    • Arthritis    • Bronchitis    • Colon polyp    • Depression    • DVT (deep venous thrombosis) (CMS/[facility]) 1989   • GERD (gastroesophageal reflux disease)    • Hard to intubate    • Headache    • Strep throat    • Urinary tract infection        Allergies   Allergen Reactions   • Latex Itching   • Sulfa Antibiotics Other (See Comments)     \"Run a high fever\"         Current Outpatient Medications:   •  amitriptyline (ELAVIL) 25 MG tablet, Take 1 tablet by mouth Every Night., Disp: 30 tablet, Rfl: 5  •  busPIRone (BUSPAR) 5 MG tablet, TAKE ONE TABLET BY MOUTH THREE TIMES A DAY AS NEEDED, Disp: 90 tablet, Rfl: 0  •  Docusate Sodium (COLACE PO), Take  by mouth Daily., Disp: , Rfl:   •  escitalopram (LEXAPRO) 10 MG tablet, Take 10 mg by mouth Every Evening., Disp: , Rfl:   •  Fexofenadine HCl (ALLEGRA PO), Take  by mouth Daily As Needed., Disp: , Rfl:   •  IBUPROFEN PO, Take 200 mg by mouth As Needed (as directed for arthritis pain)., Disp: , Rfl:   •  Omega-3 Fatty Acids (FISH OIL) 1000 MG capsule capsule, Take 1 capsule by mouth Daily With Breakfast., Disp: 90 capsule, Rfl: 2  •  omeprazole (priLOSEC) 40 MG capsule, TAKE ONE CAPSULE BY MOUTH EVERY DAY, Disp: 30 capsule, Rfl: 1  •  vitamin D (ERGOCALCIFEROL) 79398 units capsule capsule, Take 1 capsule by mouth 1 (One) Time Per Week., Disp: 30 capsule, Rfl: 1    Past Surgical History:   Procedure " "Laterality Date   •  SECTION     • COLONOSCOPY     • EXCISION MASS LEG Right 2018    Procedure: EXCISION MASS RIGHT KNEE                             (LATEX ALLERGY);  Surgeon: Jose David Desai MD;  Location: [facility];  Service: Orthopedics   • PARTIAL HYSTERECTOMY     • SHOULDER SURGERY      right shoulder   • TONSILLECTOMY     • VAGINAL DILATATION      age 15           ROS: Review of systems has been updated as of today's date.  All other systems are negative except as noted previously.    PHYSICAL EXAMINATION:       Ht 162.6 cm (64\")   Wt 80.7 kg (178 lb)   LMP 1999 (Approximate)   BMI 30.55 kg/m²     Physical Exam   Constitutional: She is oriented to person, place, and time. She appears well-developed.   HENT:   Head: Normocephalic and atraumatic.   Eyes: EOM are normal. Pupils are equal, round, and reactive to light.   Neck: Neck supple.   Pulmonary/Chest: Effort normal.   Musculoskeletal: She exhibits tenderness.   Neurological: She is alert and oriented to person, place, and time.   Skin: Skin is warm and dry.   Psychiatric: She has a normal mood and affect.       GAIT:     []  Normal  []  Antalgic    Assistive device: [x]  None  []  Walker     []  Crutches  []  Cane     []  Wheelchair  []  Stretcher    Ortho Exam  Crepitus right side greater than left 2+ crepitus well-healed scar anteriorly medial joint line is tender as well mild fullness in the back calf is negative neurovascular intact distally.    No results found.    X-ray shows mild patellofemoral arthritis joint space otherwise fairly well-preserved      ASSESSMENT:    Diagnoses and all orders for this visit:    Chronic pain of right knee    Patellofemoral arthritis of right knee    Other orders  -     Large Joint Arthrocentesis: R knee          PLAN gone over x-rays with her.  I will inject the knee intra-articular with mixture of Xylocaine and Kenalog.  She will ice it down we will see her in 3 weeks to see how she " is doing which would better consider other options at that point  Large Joint Arthrocentesis: R knee  Date/Time: 8/30/2019 1:48 PM  Consent given by: patient  Site marked: site marked  Timeout: Immediately prior to procedure a time out was called to verify the correct patient, procedure, equipment, support staff and site/side marked as required   Supporting Documentation  Indications: pain   Procedure Details  Location: knee - R knee  Preparation: Patient was prepped and draped in the usual sterile fashion  Needle size: 22 G  Medications administered: 4 mL lidocaine PF 1% 1 %; 80 mg triamcinolone acetonide 40 MG/ML              No Follow-up on file.      This document has been electronically signed by Jose David Desai MD on August 30, 2019 5:49 PM

## 2019-09-23 ENCOUNTER — OFFICE VISIT (OUTPATIENT)
Dept: ORTHOPEDIC SURGERY | Facility: CLINIC | Age: 54
End: 2019-09-23

## 2019-09-23 VITALS — WEIGHT: 174 LBS | BODY MASS INDEX: 29.71 KG/M2 | HEIGHT: 64 IN

## 2019-09-23 DIAGNOSIS — G89.29 CHRONIC PAIN OF RIGHT KNEE: Primary | ICD-10-CM

## 2019-09-23 DIAGNOSIS — M25.861 CYST OF RIGHT KNEE JOINT: ICD-10-CM

## 2019-09-23 DIAGNOSIS — M25.561 CHRONIC PAIN OF RIGHT KNEE: Primary | ICD-10-CM

## 2019-09-23 DIAGNOSIS — M17.11 PATELLOFEMORAL ARTHRITIS OF RIGHT KNEE: ICD-10-CM

## 2019-09-23 PROCEDURE — 99213 OFFICE O/P EST LOW 20 MIN: CPT | Performed by: ORTHOPAEDIC SURGERY

## 2019-09-23 NOTE — PROGRESS NOTES
"Natividad Montgomery is a 54 y.o. female returns for     Chief Complaint   Patient presents with   • Right Knee - Follow-up       HISTORY OF PRESENT ILLNESS:Right knee pain follow up. Right knee was injected on 2019 with some improvement. She still has pain when she goes up and down stairs.   Overall she is better still having issues     CONCURRENT MEDICAL HISTORY:    Past Medical History:   Diagnosis Date   • Allergic    • Anxiety    • Arthritis    • Bronchitis    • Colon polyp    • Depression    • DVT (deep venous thrombosis) (CMS/[facility])    • GERD (gastroesophageal reflux disease)    • Hard to intubate    • Headache    • Strep throat    • Urinary tract infection        Allergies   Allergen Reactions   • Latex Itching   • Sulfa Antibiotics Other (See Comments)     \"Run a high fever\"         Current Outpatient Medications:   •  amitriptyline (ELAVIL) 25 MG tablet, Take 1 tablet by mouth Every Night., Disp: 30 tablet, Rfl: 5  •  busPIRone (BUSPAR) 5 MG tablet, TAKE ONE TABLET BY MOUTH THREE TIMES A DAY AS NEEDED, Disp: 90 tablet, Rfl: 0  •  Docusate Sodium (COLACE PO), Take  by mouth Daily., Disp: , Rfl:   •  escitalopram (LEXAPRO) 10 MG tablet, Take 10 mg by mouth Every Evening., Disp: , Rfl:   •  Fexofenadine HCl (ALLEGRA PO), Take  by mouth Daily As Needed., Disp: , Rfl:   •  IBUPROFEN PO, Take 200 mg by mouth As Needed (as directed for arthritis pain)., Disp: , Rfl:   •  Omega-3 Fatty Acids (FISH OIL) 1000 MG capsule capsule, Take 1 capsule by mouth Daily With Breakfast., Disp: 90 capsule, Rfl: 2  •  omeprazole (priLOSEC) 40 MG capsule, TAKE ONE CAPSULE BY MOUTH EVERY DAY, Disp: 30 capsule, Rfl: 1  •  vitamin D (ERGOCALCIFEROL) 51270 units capsule capsule, Take 1 capsule by mouth 1 (One) Time Per Week., Disp: 30 capsule, Rfl: 1    Past Surgical History:   Procedure Laterality Date   •  SECTION     • COLONOSCOPY     • EXCISION MASS LEG Right 2018    Procedure: EXCISION MASS RIGHT KNEE       " "                      (LATEX ALLERGY);  Surgeon: Jose David Desai MD;  Location: [facility];  Service: Orthopedics   • PARTIAL HYSTERECTOMY     • SHOULDER SURGERY  2005    right shoulder   • TONSILLECTOMY     • VAGINAL DILATATION      age 15           ROS: Review of systems has been updated as of today's date.  All other systems are negative except as noted previously.    PHYSICAL EXAMINATION:       Ht 162.6 cm (64\")   Wt 78.9 kg (174 lb)   LMP 01/01/1999 (Approximate)   BMI 29.87 kg/m²     Physical Exam   Constitutional: She is oriented to person, place, and time. She appears well-developed.   HENT:   Head: Normocephalic and atraumatic.   Eyes: EOM are normal. Pupils are equal, round, and reactive to light.   Neck: Neck supple.   Pulmonary/Chest: Effort normal.   Musculoskeletal: Normal range of motion. She exhibits tenderness.   Neurological: She is alert and oriented to person, place, and time.   Skin: Skin is warm and dry.   Psychiatric: She has a normal mood and affect.       GAIT:     [x]  Normal  []  Antalgic    Assistive device: [x]  None  []  Walker     []  Crutches  []  Cane     []  Wheelchair  []  Stretcher    Ortho Exam      No results found.          ASSESSMENT:    Diagnoses and all orders for this visit:    Chronic pain of right knee    Patellofemoral arthritis of right knee    Cyst of right knee joint          PLAN appear than prior to the knee brace work on range of motion follow-up in the office with any continued problems.    No Follow-up on file.      This document has been electronically signed by Jose David Desai MD on September 23, 2019 5:09 PM    "

## 2019-10-03 ENCOUNTER — OFFICE VISIT (OUTPATIENT)
Dept: FAMILY MEDICINE CLINIC | Facility: CLINIC | Age: 54
End: 2019-10-03

## 2019-10-03 VITALS
DIASTOLIC BLOOD PRESSURE: 64 MMHG | HEIGHT: 64 IN | SYSTOLIC BLOOD PRESSURE: 124 MMHG | BODY MASS INDEX: 30.42 KG/M2 | WEIGHT: 178.2 LBS

## 2019-10-03 DIAGNOSIS — K21.9 GASTROESOPHAGEAL REFLUX DISEASE WITHOUT ESOPHAGITIS: ICD-10-CM

## 2019-10-03 DIAGNOSIS — F51.01 PRIMARY INSOMNIA: ICD-10-CM

## 2019-10-03 DIAGNOSIS — Z11.59 NEED FOR HEPATITIS C SCREENING TEST: ICD-10-CM

## 2019-10-03 DIAGNOSIS — Z12.11 SCREENING FOR COLON CANCER: ICD-10-CM

## 2019-10-03 DIAGNOSIS — E78.2 MIXED HYPERLIPIDEMIA: Primary | ICD-10-CM

## 2019-10-03 DIAGNOSIS — Z23 NEED FOR INFLUENZA VACCINATION: ICD-10-CM

## 2019-10-03 DIAGNOSIS — F33.0 MILD EPISODE OF RECURRENT MAJOR DEPRESSIVE DISORDER (HCC): ICD-10-CM

## 2019-10-03 PROCEDURE — 90471 IMMUNIZATION ADMIN: CPT | Performed by: NURSE PRACTITIONER

## 2019-10-03 PROCEDURE — 99214 OFFICE O/P EST MOD 30 MIN: CPT | Performed by: NURSE PRACTITIONER

## 2019-10-03 PROCEDURE — 90674 CCIIV4 VAC NO PRSV 0.5 ML IM: CPT | Performed by: NURSE PRACTITIONER

## 2019-10-03 RX ORDER — OMEGA-3-ACID ETHYL ESTERS 1 G/1
1 CAPSULE, LIQUID FILLED ORAL
Refills: 2 | COMMUNITY
Start: 2019-09-26 | End: 2020-06-09 | Stop reason: SDUPTHER

## 2019-10-03 NOTE — PROGRESS NOTES
"Subjective   Natividad Montgomery is a 54 y.o. female.  Six month follow-up.  Stopped taking Elavil for insomnia \"because it was making me to tired.  I was sleeping all the time.\"  Reports she is sleeping good now.  Sleeps on average 8 to 10 hours a night.    Insomnia   This is a chronic problem. The current episode started more than 1 month ago. The problem occurs intermittently. The problem has been gradually improving. Pertinent negatives include no chest pain, chills, diaphoresis, fatigue or fever. Nothing aggravates the symptoms. She has tried relaxation (Melatonin, Elavil, Vistaril) for the symptoms. The treatment provided significant relief.   Depression   Visit Type: follow-up  Patient presents with the following symptoms: depressed mood and insomnia.  Patient is not experiencing: confusion.  Frequency of symptoms: occasionally   Severity: mild   Sleep quality: good  Nighttime awakenings: occasional  Compliance with medications:  %        Heartburn   She complains of belching and heartburn. She reports no chest pain. This is a chronic problem. The current episode started more than 1 year ago. The problem occurs occasionally. The problem has been unchanged. The heartburn does not wake her from sleep. The heartburn does not limit her activity. The heartburn doesn't change with position. The symptoms are aggravated by certain foods and stress. Pertinent negatives include no fatigue. Risk factors include obesity and lack of exercise. She has tried a PPI for the symptoms. The treatment provided significant relief.   Hyperlipidemia   This is a chronic problem. The current episode started more than 1 year ago. The problem is controlled. Factors aggravating her hyperlipidemia include fatty foods. Pertinent negatives include no chest pain or leg pain. The current treatment provides significant improvement of lipids. There are no compliance problems.  Risk factors for coronary artery disease include dyslipidemia, " family history and post-menopausal.        The following portions of the patient's history were reviewed and updated as appropriate:     Current Outpatient Medications   Medication Sig Dispense Refill   • busPIRone (BUSPAR) 5 MG tablet TAKE ONE TABLET BY MOUTH THREE TIMES A DAY AS NEEDED 90 tablet 0   • Docusate Sodium (COLACE PO) Take  by mouth Daily.     • escitalopram (LEXAPRO) 10 MG tablet Take 10 mg by mouth Every Evening.     • Fexofenadine HCl (ALLEGRA PO) Take  by mouth Daily As Needed.     • IBUPROFEN PO Take 200 mg by mouth As Needed (as directed for arthritis pain).     • omega-3 acid ethyl esters (LOVAZA) 1 g capsule Take 1 g by mouth Daily With Breakfast.  2   • Omega-3 Fatty Acids (FISH OIL) 1000 MG capsule capsule Take 1 capsule by mouth Daily With Breakfast. 90 capsule 2   • omeprazole (priLOSEC) 40 MG capsule TAKE ONE CAPSULE BY MOUTH EVERY DAY 30 capsule 1   • vitamin D (ERGOCALCIFEROL) 25786 units capsule capsule Take 1 capsule by mouth 1 (One) Time Per Week. 30 capsule 1     No current facility-administered medications for this visit.      Current Outpatient Medications on File Prior to Visit   Medication Sig   • busPIRone (BUSPAR) 5 MG tablet TAKE ONE TABLET BY MOUTH THREE TIMES A DAY AS NEEDED   • Docusate Sodium (COLACE PO) Take  by mouth Daily.   • escitalopram (LEXAPRO) 10 MG tablet Take 10 mg by mouth Every Evening.   • Fexofenadine HCl (ALLEGRA PO) Take  by mouth Daily As Needed.   • IBUPROFEN PO Take 200 mg by mouth As Needed (as directed for arthritis pain).   • omega-3 acid ethyl esters (LOVAZA) 1 g capsule Take 1 g by mouth Daily With Breakfast.   • Omega-3 Fatty Acids (FISH OIL) 1000 MG capsule capsule Take 1 capsule by mouth Daily With Breakfast.   • omeprazole (priLOSEC) 40 MG capsule TAKE ONE CAPSULE BY MOUTH EVERY DAY   • vitamin D (ERGOCALCIFEROL) 20687 units capsule capsule Take 1 capsule by mouth 1 (One) Time Per Week.   • [DISCONTINUED] amitriptyline (ELAVIL) 25 MG tablet Take  "1 tablet by mouth Every Night.     No current facility-administered medications on file prior to visit.      She is allergic to latex and sulfa antibiotics..    Review of Systems   Constitutional: Negative.  Negative for chills, diaphoresis, fatigue and fever.   Respiratory: Negative.    Cardiovascular: Negative.  Negative for chest pain.   Gastrointestinal: Positive for heartburn.   Genitourinary: Negative.    Musculoskeletal: Negative.    Skin: Negative.    Neurological: Negative.    Psychiatric/Behavioral: Negative for confusion. The patient has insomnia.        Objective    Visit Vitals  /64   Ht 162.6 cm (64\")   Wt 80.8 kg (178 lb 3.2 oz)   LMP 01/01/1999 (Approximate)   BMI 30.59 kg/m²       Physical Exam   Constitutional: She is oriented to person, place, and time. She appears well-developed and well-nourished.   HENT:   Head: Normocephalic.   Right Ear: External ear normal.   Left Ear: External ear normal.   Eyes: EOM are normal. Pupils are equal, round, and reactive to light.   Neck: Normal range of motion. Neck supple.   Cardiovascular: Normal rate, regular rhythm and normal heart sounds.   Pulmonary/Chest: Effort normal and breath sounds normal.   Abdominal: Soft. Bowel sounds are normal.   Musculoskeletal: Normal range of motion.   Neurological: She is alert and oriented to person, place, and time.   Skin: Skin is warm. Capillary refill takes less than 2 seconds.   Psychiatric: She has a normal mood and affect. Her behavior is normal.   Nursing note and vitals reviewed.      Assessment/Plan   Problems Addressed this Visit        Digestive    GERD (gastroesophageal reflux disease)       Other    Depression    Relevant Orders    Comprehensive Metabolic Panel      Other Visit Diagnoses     Mixed hyperlipidemia    -  Primary    Relevant Medications    omega-3 acid ethyl esters (LOVAZA) 1 g capsule    Other Relevant Orders    Lipid panel    Primary insomnia        Screening for colon cancer        " Relevant Orders    Ambulatory Referral For Screening Colonoscopy    Need for influenza vaccination        Relevant Orders    Fluzone  ([id number]) (Completed)    Need for hepatitis C screening test        Relevant Orders    Hepatitis C antibody      No orders of the defined types were placed in this encounter.    1.  Mixed hyperlipidemia:  Continue on the Vyvanse as previously prescribed and refill prescription sent to pharmacy  Complete fasting lipid panel as ordered and will notify results when available  Encouraged to continue on low-fat diet    2.  GERD:  Continue on omeprazole as previously prescribed  Encouraged to reduce fried, fatty, greasy and spicy foods and diet in order to reduce gastrointestinal discomfort  May elevate head of bed 15 degrees to reduce nighttime flareups    3.  Depression:  Continue on Lexapro and BuSpar as previously prescribed  Educated on possible side of this medication including but not limited possible suicidal ideations  Encouraged to discontinue medication immediately if suicidal ideations occur and seek emergency medical treatment    4.  Primary insomnia:  Discontinue amitriptyline as previously prescribed  Symptoms have resolved at this time  Encouraged to turn off all electronic devices including lights and television and bedroom at night and to promote an environment conducive to sleep    5.  Need for influenza vaccination:  Influenza vaccine given IM in office  Educated on possible side effects of this medication including but not limited to pain, swelling and redness of injection site as well as flulike symptoms    6.  Screening for colon cancer:  Gastroenterology will call to schedule colonoscopy and will notify results when available    7.  Need for Hepatitis C screening:  Complete hepatitis C antibody as ordered and will notify results when available    Continue on current medications as previously prescribed   I spent 22 minutes in direct face to face contact with  patient.  Greater than 50% of this time was spent counseling patient and discussing plan of care.  Return in about 6 months (around 4/3/2020) for Annual physical.        This document has been electronically signed by DHARMESH Garza on October 3, 2019 3:22 PM

## 2019-10-16 RX ORDER — ESCITALOPRAM OXALATE 10 MG/1
TABLET ORAL
Qty: 30 TABLET | Refills: 5 | Status: SHIPPED | OUTPATIENT
Start: 2019-10-16 | End: 2020-05-05 | Stop reason: SDUPTHER

## 2019-10-17 ENCOUNTER — LAB (OUTPATIENT)
Dept: LAB | Facility: HOSPITAL | Age: 54
End: 2019-10-17

## 2019-10-17 DIAGNOSIS — Z11.59 NEED FOR HEPATITIS C SCREENING TEST: ICD-10-CM

## 2019-10-17 DIAGNOSIS — F33.0 MILD EPISODE OF RECURRENT MAJOR DEPRESSIVE DISORDER (HCC): ICD-10-CM

## 2019-10-17 DIAGNOSIS — E78.2 MIXED HYPERLIPIDEMIA: ICD-10-CM

## 2019-10-17 LAB
ALBUMIN SERPL-MCNC: 4.7 G/DL (ref 3.5–5.2)
ALBUMIN/GLOB SERPL: 1.7 G/DL
ALP SERPL-CCNC: 93 U/L (ref 39–117)
ALT SERPL W P-5'-P-CCNC: 11 U/L (ref 1–33)
ANION GAP SERPL CALCULATED.3IONS-SCNC: 10 MMOL/L (ref 5–15)
AST SERPL-CCNC: 12 U/L (ref 1–32)
BILIRUB SERPL-MCNC: 0.4 MG/DL (ref 0.2–1.2)
BUN BLD-MCNC: 15 MG/DL (ref 6–20)
BUN/CREAT SERPL: 26.8 (ref 7–25)
CALCIUM SPEC-SCNC: 9.4 MG/DL (ref 8.6–10.5)
CHLORIDE SERPL-SCNC: 102 MMOL/L (ref 98–107)
CHOLEST SERPL-MCNC: 210 MG/DL (ref 0–200)
CO2 SERPL-SCNC: 30 MMOL/L (ref 22–29)
CREAT BLD-MCNC: 0.56 MG/DL (ref 0.57–1)
GFR SERPL CREATININE-BSD FRML MDRD: 112 ML/MIN/1.73
GLOBULIN UR ELPH-MCNC: 2.8 GM/DL
GLUCOSE BLD-MCNC: 95 MG/DL (ref 65–99)
HCV AB SER DONR QL: NORMAL
HDLC SERPL-MCNC: 64 MG/DL (ref 40–60)
LDLC SERPL CALC-MCNC: 131 MG/DL (ref 0–100)
LDLC/HDLC SERPL: 2.05 {RATIO}
POTASSIUM BLD-SCNC: 4.4 MMOL/L (ref 3.5–5.2)
PROT SERPL-MCNC: 7.5 G/DL (ref 6–8.5)
SODIUM BLD-SCNC: 142 MMOL/L (ref 136–145)
TRIGL SERPL-MCNC: 73 MG/DL (ref 0–150)
VLDLC SERPL-MCNC: 14.6 MG/DL (ref 5–40)

## 2019-10-17 PROCEDURE — 80053 COMPREHEN METABOLIC PANEL: CPT

## 2019-10-17 PROCEDURE — 80061 LIPID PANEL: CPT

## 2019-10-17 PROCEDURE — 86803 HEPATITIS C AB TEST: CPT

## 2019-10-22 ENCOUNTER — TELEPHONE (OUTPATIENT)
Dept: FAMILY MEDICINE CLINIC | Facility: CLINIC | Age: 54
End: 2019-10-22

## 2019-10-22 DIAGNOSIS — E78.2 MIXED HYPERLIPIDEMIA: Primary | ICD-10-CM

## 2019-10-22 NOTE — TELEPHONE ENCOUNTER
Per DHARMESH Chase, Ms. Montgomery has been called with recent lab results & recommendations.  Continue current medications and follow-up as planned or sooner if any problems.    Fasting Labs ordered for 3 mos.      ----- Message from DHARMESH Garza sent at 10/21/2019 11:42 AM CDT -----  Hepatitis B, sugar, sodium, potassium, kidney function and liver functions were within normal limits.  Total cholesterol level has elevated and is now up to 10 and bad cholesterol at 131.  Good cholesterol is also too high at 64.  Please make sure she is continuing on her Lovaza as previously prescribed as well as adhering to a low-fat diet.  She also needs to increase her exercises such as walking which will help to reduce cholesterol levels.  If no improvement in 3 months we may have to discuss and add on pharmacological treatment options such as a statin.

## 2019-10-22 NOTE — PROGRESS NOTES
Per DHARMESH Chase, Ms. Montgomery has been called with recent lab results & recommendations.  Continue current medications and follow-up as planned or sooner if any problems.    Labs ordered for 3 mos.

## 2019-10-31 DIAGNOSIS — K21.9 GASTROESOPHAGEAL REFLUX DISEASE WITHOUT ESOPHAGITIS: ICD-10-CM

## 2019-11-01 RX ORDER — OMEPRAZOLE 40 MG/1
CAPSULE, DELAYED RELEASE ORAL
Qty: 30 CAPSULE | Refills: 1 | Status: SHIPPED | OUTPATIENT
Start: 2019-11-01 | End: 2020-01-03

## 2019-11-19 DIAGNOSIS — E55.9 VITAMIN D DEFICIENCY: ICD-10-CM

## 2019-11-19 RX ORDER — ERGOCALCIFEROL 1.25 MG/1
CAPSULE ORAL
Qty: 4 CAPSULE | Refills: 48 | Status: SHIPPED | OUTPATIENT
Start: 2019-11-19 | End: 2020-12-10

## 2020-01-03 DIAGNOSIS — K21.9 GASTROESOPHAGEAL REFLUX DISEASE WITHOUT ESOPHAGITIS: ICD-10-CM

## 2020-01-03 RX ORDER — OMEPRAZOLE 40 MG/1
CAPSULE, DELAYED RELEASE ORAL
Qty: 30 CAPSULE | Refills: 3 | Status: SHIPPED | OUTPATIENT
Start: 2020-01-03 | End: 2020-05-05

## 2020-01-10 RX ORDER — BUSPIRONE HYDROCHLORIDE 5 MG/1
TABLET ORAL
Qty: 90 TABLET | Refills: 0 | Status: SHIPPED | OUTPATIENT
Start: 2020-01-10 | End: 2020-03-13

## 2020-02-11 DIAGNOSIS — E78.2 MIXED HYPERLIPIDEMIA: ICD-10-CM

## 2020-02-11 RX ORDER — OMEGA-3-ACID ETHYL ESTERS 1 G/1
CAPSULE, LIQUID FILLED ORAL
Qty: 90 CAPSULE | Refills: 2 | Status: SHIPPED | OUTPATIENT
Start: 2020-02-11 | End: 2020-06-09 | Stop reason: SDUPTHER

## 2020-03-13 RX ORDER — BUSPIRONE HYDROCHLORIDE 5 MG/1
TABLET ORAL
Qty: 90 TABLET | Refills: 0 | Status: SHIPPED | OUTPATIENT
Start: 2020-03-13 | End: 2020-05-05 | Stop reason: SDUPTHER

## 2020-03-13 RX ORDER — OMEGA-3-ACID ETHYL ESTERS 1 G/1
CAPSULE, LIQUID FILLED ORAL
Qty: 30 CAPSULE | Refills: 0 | Status: SHIPPED | OUTPATIENT
Start: 2020-03-13 | End: 2020-06-09 | Stop reason: SDUPTHER

## 2020-05-04 DIAGNOSIS — K21.9 GASTROESOPHAGEAL REFLUX DISEASE WITHOUT ESOPHAGITIS: ICD-10-CM

## 2020-05-04 RX ORDER — OMEPRAZOLE 40 MG/1
CAPSULE, DELAYED RELEASE ORAL
Qty: 30 CAPSULE | Refills: 3 | OUTPATIENT
Start: 2020-05-04

## 2020-05-04 RX ORDER — OMEGA-3-ACID ETHYL ESTERS 1 G/1
CAPSULE, LIQUID FILLED ORAL
Qty: 30 CAPSULE | Refills: 0 | OUTPATIENT
Start: 2020-05-04

## 2020-05-04 RX ORDER — ESCITALOPRAM OXALATE 10 MG/1
TABLET ORAL
Qty: 30 TABLET | Refills: 5 | OUTPATIENT
Start: 2020-05-04

## 2020-05-04 RX ORDER — BUSPIRONE HYDROCHLORIDE 5 MG/1
TABLET ORAL
Qty: 90 TABLET | Refills: 0 | OUTPATIENT
Start: 2020-05-04

## 2020-05-05 DIAGNOSIS — K21.9 GASTROESOPHAGEAL REFLUX DISEASE WITHOUT ESOPHAGITIS: ICD-10-CM

## 2020-05-05 RX ORDER — ESCITALOPRAM OXALATE 10 MG/1
10 TABLET ORAL DAILY
Qty: 30 TABLET | Refills: 1 | Status: SHIPPED | OUTPATIENT
Start: 2020-05-05 | End: 2020-06-09 | Stop reason: DRUGHIGH

## 2020-05-05 RX ORDER — OMEPRAZOLE 40 MG/1
CAPSULE, DELAYED RELEASE ORAL
Qty: 30 CAPSULE | Refills: 1 | Status: SHIPPED | OUTPATIENT
Start: 2020-05-05 | End: 2020-06-29

## 2020-05-05 RX ORDER — OMEGA-3-ACID ETHYL ESTERS 1 G/1
CAPSULE, LIQUID FILLED ORAL
Qty: 30 CAPSULE | Refills: 1 | Status: SHIPPED | OUTPATIENT
Start: 2020-05-05 | End: 2020-06-29

## 2020-05-05 RX ORDER — OMEGA-3-ACID ETHYL ESTERS 1 G/1
1 CAPSULE, LIQUID FILLED ORAL
Refills: 2 | OUTPATIENT
Start: 2020-05-05

## 2020-05-05 RX ORDER — BUSPIRONE HYDROCHLORIDE 5 MG/1
5 TABLET ORAL 3 TIMES DAILY PRN
Qty: 90 TABLET | Refills: 0 | Status: SHIPPED | OUTPATIENT
Start: 2020-05-05 | End: 2020-06-01

## 2020-05-05 NOTE — TELEPHONE ENCOUNTER
Patient called in requesting a refill on escitalopram (LEXAPRO) 10 MG tablet, omeprazole (priLOSEC) 40 MG capsule, busPIRone (BUSPAR) 5 MG tablet, and omega-3 acid ethyl esters (LOVAZA) 1 g capsule.       Pharmacy confirmed

## 2020-06-01 RX ORDER — BUSPIRONE HYDROCHLORIDE 5 MG/1
5 TABLET ORAL 3 TIMES DAILY PRN
Qty: 90 TABLET | Refills: 0 | Status: SHIPPED | OUTPATIENT
Start: 2020-06-01 | End: 2020-06-29

## 2020-06-09 ENCOUNTER — OFFICE VISIT (OUTPATIENT)
Dept: FAMILY MEDICINE CLINIC | Facility: CLINIC | Age: 55
End: 2020-06-09

## 2020-06-09 VITALS
SYSTOLIC BLOOD PRESSURE: 96 MMHG | WEIGHT: 176 LBS | BODY MASS INDEX: 30.05 KG/M2 | HEIGHT: 64 IN | DIASTOLIC BLOOD PRESSURE: 68 MMHG

## 2020-06-09 DIAGNOSIS — Z13.29 SCREENING FOR HYPOTHYROIDISM: ICD-10-CM

## 2020-06-09 DIAGNOSIS — Z23 NEED FOR SHINGLES VACCINE: ICD-10-CM

## 2020-06-09 DIAGNOSIS — K21.9 GASTROESOPHAGEAL REFLUX DISEASE WITHOUT ESOPHAGITIS: ICD-10-CM

## 2020-06-09 DIAGNOSIS — K64.9 HEMORRHOIDS, UNSPECIFIED HEMORRHOID TYPE: ICD-10-CM

## 2020-06-09 DIAGNOSIS — Z12.39 SCREENING FOR BREAST CANCER: ICD-10-CM

## 2020-06-09 DIAGNOSIS — F33.1 MODERATE EPISODE OF RECURRENT MAJOR DEPRESSIVE DISORDER (HCC): ICD-10-CM

## 2020-06-09 DIAGNOSIS — Z13.220 SCREENING FOR HYPERCHOLESTEROLEMIA: ICD-10-CM

## 2020-06-09 DIAGNOSIS — Z00.00 ANNUAL PHYSICAL EXAM: Primary | ICD-10-CM

## 2020-06-09 PROCEDURE — 90471 IMMUNIZATION ADMIN: CPT | Performed by: NURSE PRACTITIONER

## 2020-06-09 PROCEDURE — 99396 PREV VISIT EST AGE 40-64: CPT | Performed by: NURSE PRACTITIONER

## 2020-06-09 PROCEDURE — 90750 HZV VACC RECOMBINANT IM: CPT | Performed by: NURSE PRACTITIONER

## 2020-06-09 RX ORDER — FEXOFENADINE HYDROCHLORIDE 180 MG/1
TABLET, FILM COATED ORAL
COMMUNITY
Start: 2020-05-04 | End: 2022-04-29 | Stop reason: SDUPTHER

## 2020-06-09 RX ORDER — FLUTICASONE PROPIONATE 50 MCG
SPRAY, SUSPENSION (ML) NASAL
COMMUNITY
Start: 2020-05-04

## 2020-06-09 RX ORDER — ESCITALOPRAM OXALATE 20 MG/1
20 TABLET ORAL DAILY
Qty: 30 TABLET | Refills: 5 | Status: SHIPPED | OUTPATIENT
Start: 2020-06-09 | End: 2021-01-07

## 2020-06-09 NOTE — PROGRESS NOTES
"Subjective   Natividad Montgomery is a 55 y.o. female.  Annual physical exam.  Currently on Lexapro for depression.  \"I still have my days.  Couple months back I was really low.  I have been having to help my mom now when I am still living with my daughter and that has been hard.  I tried to get disability and it was denied.  It was really rough then but I do feel better now.\"  Has been complaining of hemorrhoid issues for the last several years.  \"Sometimes it gets really bad.  They are okay today but I was wondering if there was a cream I could use that is better than Preparation H?\"    HPI:  Annual physical exam     Depression   Visit Type: follow-up  Patient presents with the following symptoms: depressed mood, excessive worry, feelings of hopelessness, irritability and nervousness/anxiety.  Patient is not experiencing: confusion, shortness of breath, suicidal ideas, suicidal planning and thoughts of death.  Frequency of symptoms: most days   Severity: moderate   Sleep quality: fair  Nighttime awakenings: occasional  Compliance with medications:  %        Heartburn   She complains of heartburn. She reports no coughing or no sore throat. This is a chronic problem. The current episode started more than 1 year ago. The problem occurs rarely. The heartburn duration is several minutes. The heartburn is located in the substernum. The heartburn does not wake her from sleep. The heartburn does not limit her activity. The heartburn doesn't change with position. The symptoms are aggravated by certain foods and stress. Pertinent negatives include no fatigue. Risk factors include obesity, NSAIDs, lack of exercise and caffeine use. She has tried a PPI for the symptoms. The treatment provided significant relief.   Hemorrhoids   This is a chronic problem. The current episode started more than 1 year ago. The problem occurs intermittently. The problem has been waxing and waning. Pertinent negatives include no chills, " coughing, diaphoresis, fatigue, fever or sore throat. Nothing aggravates the symptoms. The treatment provided mild (Preparation H) relief.        The following portions of the patient's history were reviewed and updated as appropriate:     Current Outpatient Medications   Medication Sig Dispense Refill   • ALLERGY RELIEF 180 MG tablet      • busPIRone (BUSPAR) 5 MG tablet TAKE 1 TABLET BY MOUTH 3 (THREE) TIMES A DAY AS NEEDED (FOR ANXIETY.). 90 tablet 0   • Docusate Sodium (COLACE PO) Take  by mouth Daily.     • fluticasone (FLONASE) 50 MCG/ACT nasal spray      • IBUPROFEN PO Take 200 mg by mouth As Needed (as directed for arthritis pain).     • omega-3 acid ethyl esters (LOVAZA) 1 g capsule TAKE 1 CAPSULE BY MOUTH DAILY WITH BREAKFAST. 30 capsule 1   • omeprazole (priLOSEC) 40 MG capsule TAKE ONE CAPSULE BY MOUTH EVERY DAY 30 capsule 1   • vitamin D (ERGOCALCIFEROL) 1.25 MG (84380 UT) capsule capsule TAKE ONE CAPSULE BY MOUTH ONCE A WEEK 4 capsule 48   • escitalopram (LEXAPRO) 20 MG tablet Take 1 tablet by mouth Daily. 30 tablet 5   • Hydrocortisone, Perianal, (ANUSOL-HC) 2.5 % rectal cream Insert  into the rectum 2 (Two) Times a Day. 30 g 1     No current facility-administered medications for this visit.      Current Outpatient Medications on File Prior to Visit   Medication Sig   • ALLERGY RELIEF 180 MG tablet    • busPIRone (BUSPAR) 5 MG tablet TAKE 1 TABLET BY MOUTH 3 (THREE) TIMES A DAY AS NEEDED (FOR ANXIETY.).   • Docusate Sodium (COLACE PO) Take  by mouth Daily.   • fluticasone (FLONASE) 50 MCG/ACT nasal spray    • IBUPROFEN PO Take 200 mg by mouth As Needed (as directed for arthritis pain).   • omega-3 acid ethyl esters (LOVAZA) 1 g capsule TAKE 1 CAPSULE BY MOUTH DAILY WITH BREAKFAST.   • omeprazole (priLOSEC) 40 MG capsule TAKE ONE CAPSULE BY MOUTH EVERY DAY   • vitamin D (ERGOCALCIFEROL) 1.25 MG (71208 UT) capsule capsule TAKE ONE CAPSULE BY MOUTH ONCE A WEEK     No current facility-administered  "medications on file prior to visit.      She is allergic to latex and sulfa antibiotics..    Review of Systems   Constitutional: Positive for irritability. Negative for chills, diaphoresis, fatigue and fever.   HENT: Negative.  Negative for sore throat.    Eyes: Negative.    Respiratory: Negative.  Negative for cough and shortness of breath.    Cardiovascular: Negative.    Gastrointestinal: Positive for heartburn and hemorrhoids.   Genitourinary:        Hemrrhoids   Musculoskeletal: Negative.    Skin: Negative.    Neurological: Negative.    Psychiatric/Behavioral: Negative for confusion and suicidal ideas. The patient is nervous/anxious.        Objective    Visit Vitals  BP 96/68   Ht 162.6 cm (64\")   Wt 79.8 kg (176 lb)   LMP 01/01/1999 (Approximate)   BMI 30.21 kg/m²       Physical Exam   Constitutional: She is oriented to person, place, and time. She appears well-developed and well-nourished.   HENT:   Head: Normocephalic.   Right Ear: External ear normal.   Left Ear: External ear normal.   Nose: Nose normal.   Mouth/Throat: Oropharynx is clear and moist.   Eyes: Pupils are equal, round, and reactive to light. Conjunctivae and EOM are normal.   Neck: Normal range of motion. Neck supple.   Cardiovascular: Normal rate, regular rhythm and normal heart sounds.   Pulmonary/Chest: Effort normal and breath sounds normal.   Bilateral, manual breast exam performed and no dimpling, puckering, masses or nodules palpated   Abdominal: Soft. Bowel sounds are normal.   Genitourinary:   Genitourinary Comments: Hemorrhoids    Musculoskeletal: Normal range of motion.   Neurological: She is alert and oriented to person, place, and time.   Skin: Skin is warm. Capillary refill takes less than 2 seconds.   Psychiatric: She has a normal mood and affect. Her behavior is normal.   Nursing note and vitals reviewed.      Assessment/Plan   Problems Addressed this Visit        Digestive    GERD (gastroesophageal reflux disease)    Relevant " Orders    CBC & Differential    Comprehensive Metabolic Panel       Other    Depression    Relevant Medications    escitalopram (LEXAPRO) 20 MG tablet      Other Visit Diagnoses     Annual physical exam    -  Primary    Hemorrhoids, unspecified hemorrhoid type        Relevant Medications    Hydrocortisone, Perianal, (ANUSOL-HC) 2.5 % rectal cream    Screening for hypothyroidism        Relevant Orders    TSH    Screening for hypercholesterolemia        Relevant Orders    Lipid panel    Screening for breast cancer        Relevant Orders    Mammo Screening Digital Tomosynthesis Bilateral With CAD    Need for shingles vaccine        Relevant Orders    Shingrix Vaccine (Completed)        New Medications Ordered This Visit   Medications   • escitalopram (LEXAPRO) 20 MG tablet     Sig: Take 1 tablet by mouth Daily.     Dispense:  30 tablet     Refill:  5   • Hydrocortisone, Perianal, (ANUSOL-HC) 2.5 % rectal cream     Sig: Insert  into the rectum 2 (Two) Times a Day.     Dispense:  30 g     Refill:  1     1.  Annual physical exam:  Continue on current medications as previously prescribed   I spent 29 minutes in direct face to face contact with patient.  Greater than 50% of this time was spent counseling patient and discussing plan of care.  Return in about 6 months (around 12/9/2020) for Recheck.    2.  Depression,  Increase Lexapro from 10 mg p.o. daily to 20 mg p.o. daily  Discussed stress relieving technique such as deep breathing, meditation and guided imagery  Discussed importance of maintaining open dialogue with her daughter during times of depression  Encouraged to seek emergency medical treatment for any new or worsening thoughts of helplessness, hopelessness or self-harm    3.  Gastroesophageal reflux disease:  Continue on Prilosec as previously prescribed  Discussed how stress can increase gastroesophageal reflux disease  Encouraged to wear loose, and restrictive clothing as this may increase episodes  Decrease  fried, fatty, greasy foods and diet in order to reduce gastrointestinal discomfort    4.  Hemorrhoids, unspecified hemorrhoid type:  Begin Anusol HC as prescribed  Encouraged warm water soaks which will help shrink size of hemorrhoids  Encourage stool softener daily  Encouraged diet high in fiber  Encouraged to seek emergency medical treatment for any rectal bleeding or blood in stool    5.  Screening for hypothyroidism:  Complete TSH as ordered and will notify results when available    6.  Screening for hypercholesterolemia:  Complete fasting lipid panel as ordered will notify results when available  Encouraged adhere to low-fat diet    7.  Screening for breast cancer:  Radiology will call to schedule bilateral mammogram  Encouraged monthly self breast exams at home    8.  Need for shingles vaccine:  Shingrix vaccine given IM in office  Educated on possible side effects of this medication including not limited to increased risk for pain, swelling and redness of injection site as well as fatigue, headache and myalgias        This document has been electronically signed by DHARMESH Garza on Monique 10, 2020 07:08

## 2020-06-10 RX ORDER — HYDROCORTISONE 25 MG/G
CREAM TOPICAL 2 TIMES DAILY
Qty: 30 G | Refills: 1 | Status: SHIPPED | OUTPATIENT
Start: 2020-06-10 | End: 2021-06-14 | Stop reason: SDUPTHER

## 2020-06-29 DIAGNOSIS — K21.9 GASTROESOPHAGEAL REFLUX DISEASE WITHOUT ESOPHAGITIS: ICD-10-CM

## 2020-06-29 RX ORDER — BUSPIRONE HYDROCHLORIDE 5 MG/1
5 TABLET ORAL 3 TIMES DAILY PRN
Qty: 90 TABLET | Refills: 0 | Status: SHIPPED | OUTPATIENT
Start: 2020-06-29 | End: 2020-12-29

## 2020-06-29 RX ORDER — ESCITALOPRAM OXALATE 10 MG/1
10 TABLET ORAL DAILY
Qty: 30 TABLET | Refills: 1 | OUTPATIENT
Start: 2020-06-29

## 2020-06-29 RX ORDER — OMEPRAZOLE 40 MG/1
CAPSULE, DELAYED RELEASE ORAL
Qty: 30 CAPSULE | Refills: 1 | Status: SHIPPED | OUTPATIENT
Start: 2020-06-29 | End: 2020-09-11

## 2020-06-29 RX ORDER — OMEGA-3-ACID ETHYL ESTERS 1 G/1
CAPSULE, LIQUID FILLED ORAL
Qty: 30 CAPSULE | Refills: 1 | Status: SHIPPED | OUTPATIENT
Start: 2020-06-29 | End: 2020-09-11

## 2020-09-11 DIAGNOSIS — K21.9 GASTROESOPHAGEAL REFLUX DISEASE WITHOUT ESOPHAGITIS: ICD-10-CM

## 2020-09-11 RX ORDER — OMEGA-3-ACID ETHYL ESTERS 1 G/1
CAPSULE, LIQUID FILLED ORAL
Qty: 30 CAPSULE | Refills: 1 | Status: SHIPPED | OUTPATIENT
Start: 2020-09-11 | End: 2020-12-10

## 2020-09-11 RX ORDER — OMEPRAZOLE 40 MG/1
CAPSULE, DELAYED RELEASE ORAL
Qty: 30 CAPSULE | Refills: 1 | Status: SHIPPED | OUTPATIENT
Start: 2020-09-11 | End: 2020-12-10

## 2020-12-01 ENCOUNTER — TELEPHONE (OUTPATIENT)
Dept: FAMILY MEDICINE CLINIC | Facility: CLINIC | Age: 55
End: 2020-12-01

## 2020-12-01 NOTE — TELEPHONE ENCOUNTER
PATIENT IS REQUESTING LAB ORDER TO CHECK FOR HER NORMAL ROUTINE BLOOD WORK AT [facility]   SHE STATES SHE IS THERE NOW TO HAVE HER LABS DRAWN           GOOD CONTACT NUMBER   [phone] (M)

## 2020-12-01 NOTE — TELEPHONE ENCOUNTER
Sorry, This was 3 hours ago, Just now getting to message.    I have left message for the patient to call me to find out where the orders need to be faxed to.

## 2020-12-09 ENCOUNTER — OFFICE VISIT (OUTPATIENT)
Dept: FAMILY MEDICINE CLINIC | Facility: CLINIC | Age: 55
End: 2020-12-09

## 2020-12-09 VITALS
HEIGHT: 64 IN | SYSTOLIC BLOOD PRESSURE: 122 MMHG | BODY MASS INDEX: 29.3 KG/M2 | WEIGHT: 171.6 LBS | DIASTOLIC BLOOD PRESSURE: 84 MMHG

## 2020-12-09 DIAGNOSIS — F33.0 MILD EPISODE OF RECURRENT MAJOR DEPRESSIVE DISORDER (HCC): ICD-10-CM

## 2020-12-09 DIAGNOSIS — K21.9 GASTROESOPHAGEAL REFLUX DISEASE WITHOUT ESOPHAGITIS: ICD-10-CM

## 2020-12-09 DIAGNOSIS — E78.00 HYPERCHOLESTEREMIA: Primary | ICD-10-CM

## 2020-12-09 DIAGNOSIS — Z23 FLU VACCINE NEED: ICD-10-CM

## 2020-12-09 DIAGNOSIS — Z23 NEED FOR SHINGLES VACCINE: ICD-10-CM

## 2020-12-09 PROCEDURE — 90472 IMMUNIZATION ADMIN EACH ADD: CPT | Performed by: NURSE PRACTITIONER

## 2020-12-09 PROCEDURE — 90750 HZV VACC RECOMBINANT IM: CPT | Performed by: NURSE PRACTITIONER

## 2020-12-09 PROCEDURE — 99213 OFFICE O/P EST LOW 20 MIN: CPT | Performed by: NURSE PRACTITIONER

## 2020-12-09 PROCEDURE — 90686 IIV4 VACC NO PRSV 0.5 ML IM: CPT | Performed by: NURSE PRACTITIONER

## 2020-12-09 PROCEDURE — 90471 IMMUNIZATION ADMIN: CPT | Performed by: NURSE PRACTITIONER

## 2020-12-09 RX ORDER — AMITRIPTYLINE HYDROCHLORIDE 25 MG/1
25 TABLET, FILM COATED ORAL NIGHTLY
COMMUNITY
End: 2021-06-14

## 2020-12-09 NOTE — PROGRESS NOTES
"Subjective   Natividad Montgomery is a 55 y.o. female.  Six month follow-up for high cholesterol, GERD and depression.  Had Lexapro increased at previous visit.  \"I am feeling much better.\"    Hyperlipidemia  This is a chronic problem. The current episode started more than 1 year ago. The problem is controlled. Exacerbating diseases include obesity. Factors aggravating her hyperlipidemia include fatty foods. Pertinent negatives include no shortness of breath. Treatments tried: Lovaza  The current treatment provides mild improvement of lipids. There are no compliance problems.  Risk factors for coronary artery disease include post-menopausal, a sedentary lifestyle, obesity, dyslipidemia and family history.   Depression  Visit Type: follow-up  Patient presents with the following symptoms: depressed mood, excessive worry, feelings of hopelessness, irritability and nervousness/anxiety.  Patient is not experiencing: confusion, shortness of breath, suicidal ideas, suicidal planning and thoughts of death.  Frequency of symptoms: most days   Severity: mild   Sleep quality: fair  Nighttime awakenings: occasional  Compliance with medications:  %        Heartburn  She complains of heartburn. This is a chronic problem. The current episode started more than 1 year ago. The problem occurs rarely. The heartburn duration is several minutes. The heartburn is located in the substernum. The heartburn does not wake her from sleep. The heartburn does not limit her activity. The heartburn doesn't change with position. The symptoms are aggravated by certain foods and stress. Risk factors include obesity, NSAIDs, lack of exercise and caffeine use. She has tried a PPI for the symptoms. The treatment provided significant relief.        The following portions of the patient's history were reviewed and updated as appropriate:   Current Outpatient Medications   Medication Sig Dispense Refill   • ALLERGY RELIEF 180 MG tablet      • " amitriptyline (ELAVIL) 25 MG tablet Take 25 mg by mouth Every Night.     • busPIRone (BUSPAR) 5 MG tablet TAKE 1 TABLET BY MOUTH 3 (THREE) TIMES A DAY AS NEEDED (FOR ANXIETY.). 90 tablet 0   • Docusate Sodium (COLACE PO) Take  by mouth Daily.     • escitalopram (LEXAPRO) 20 MG tablet Take 1 tablet by mouth Daily. 30 tablet 5   • fluticasone (FLONASE) 50 MCG/ACT nasal spray      • Hydrocortisone, Perianal, (ANUSOL-HC) 2.5 % rectal cream Insert  into the rectum 2 (Two) Times a Day. 30 g 1   • IBUPROFEN PO Take 200 mg by mouth As Needed (as directed for arthritis pain).     • omega-3 acid ethyl esters (LOVAZA) 1 g capsule TAKE 1 CAPSULE BY MOUTH DAILY WITH BREAKFAST. 30 capsule 1   • omeprazole (priLOSEC) 40 MG capsule TAKE ONE CAPSULE BY MOUTH EVERY DAY 30 capsule 1   • vitamin D (ERGOCALCIFEROL) 1.25 MG (71223 UT) capsule capsule TAKE ONE CAPSULE BY MOUTH ONCE A WEEK 4 capsule 48     No current facility-administered medications for this visit.      Current Outpatient Medications on File Prior to Visit   Medication Sig   • ALLERGY RELIEF 180 MG tablet    • amitriptyline (ELAVIL) 25 MG tablet Take 25 mg by mouth Every Night.   • busPIRone (BUSPAR) 5 MG tablet TAKE 1 TABLET BY MOUTH 3 (THREE) TIMES A DAY AS NEEDED (FOR ANXIETY.).   • Docusate Sodium (COLACE PO) Take  by mouth Daily.   • escitalopram (LEXAPRO) 20 MG tablet Take 1 tablet by mouth Daily.   • fluticasone (FLONASE) 50 MCG/ACT nasal spray    • Hydrocortisone, Perianal, (ANUSOL-HC) 2.5 % rectal cream Insert  into the rectum 2 (Two) Times a Day.   • IBUPROFEN PO Take 200 mg by mouth As Needed (as directed for arthritis pain).   • omega-3 acid ethyl esters (LOVAZA) 1 g capsule TAKE 1 CAPSULE BY MOUTH DAILY WITH BREAKFAST.   • omeprazole (priLOSEC) 40 MG capsule TAKE ONE CAPSULE BY MOUTH EVERY DAY   • vitamin D (ERGOCALCIFEROL) 1.25 MG (47673 UT) capsule capsule TAKE ONE CAPSULE BY MOUTH ONCE A WEEK     No current facility-administered medications on file prior  "to visit.      She is allergic to latex and sulfa antibiotics..    Review of Systems   Constitutional: Positive for irritability.   HENT: Negative.    Respiratory: Negative.  Negative for shortness of breath.    Cardiovascular: Negative.    Gastrointestinal: Positive for heartburn.   Genitourinary: Negative.    Skin: Negative.    Neurological: Negative.    Psychiatric/Behavioral: Negative for confusion and suicidal ideas. The patient is nervous/anxious.        Objective    Visit Vitals  /84   Ht 162.6 cm (64\")   Wt 77.8 kg (171 lb 9.6 oz)   LMP 01/01/1999 (Approximate)   BMI 29.46 kg/m²       Physical Exam  Vitals signs and nursing note reviewed.   Constitutional:       Appearance: She is well-developed.   HENT:      Head: Normocephalic.      Right Ear: External ear normal.      Left Ear: External ear normal.   Eyes:      Pupils: Pupils are equal, round, and reactive to light.   Neck:      Musculoskeletal: Normal range of motion and neck supple.   Cardiovascular:      Rate and Rhythm: Normal rate and regular rhythm.      Heart sounds: Normal heart sounds.   Pulmonary:      Effort: Pulmonary effort is normal.      Breath sounds: Normal breath sounds.   Abdominal:      General: Bowel sounds are normal.      Palpations: Abdomen is soft.   Musculoskeletal: Normal range of motion.   Skin:     General: Skin is warm.      Capillary Refill: Capillary refill takes less than 2 seconds.   Neurological:      Mental Status: She is alert and oriented to person, place, and time.   Psychiatric:         Behavior: Behavior normal.         Assessment/Plan   Problems Addressed this Visit        Digestive    GERD (gastroesophageal reflux disease)       Other    Depression    Relevant Medications    amitriptyline (ELAVIL) 25 MG tablet      Other Visit Diagnoses     Hypercholesteremia    -  Primary    Flu vaccine need        Relevant Orders    Fluarix Quad >6 Months (5083-4954) (Completed)    Need for shingles vaccine        " Relevant Orders    Shingrix Vaccine (Completed)      Diagnoses       Codes Comments    Hypercholesteremia    -  Primary ICD-10-CM: E78.00  ICD-9-CM: 272.0     Mild episode of recurrent major depressive disorder (CMS/HCC)     ICD-10-CM: F33.0  ICD-9-CM: 296.31     Gastroesophageal reflux disease without esophagitis     ICD-10-CM: K21.9  ICD-9-CM: 530.81     Flu vaccine need     ICD-10-CM: Z23  ICD-9-CM: V04.81     Need for shingles vaccine     ICD-10-CM: Z23  ICD-9-CM: V04.89       No orders of the defined types were placed in this encounter.    1.  Hypercholesterolemia:  Continue Lovenox as previously prescribed  Encouraged to continue to adhere to a low-fat diet  Discussed ways to reduce saturated fat in diet such as cooking with olive oil as opposed to vegetable oil    2.  Depression,  Continue on Lexapro as previously prescribed  Encouraged to continue with emotional support for her sister who is has been recently passed  Discussed taking at least 1 hour/day for an activity she enjoys such as reading a book, taking a walk or listening to music     3.  Gastroesophageal reflux disease:  Continue on Prilosec as previously prescribed  Discussed Possible side effects of long-term use of PPIs including but not limited to increased risk for osteoporosis  Encouraged to avoid triggers such as stress, alcohol    4.  Flu vaccine need:  Influenza vaccine given IM in office  Educated on possible side effects of this medication including but not limited to pain, swelling and redness of injection site as well as flulike symptoms    5.  Need for shingles vaccine:  Shingrix vaccine given IM in office  Educated on possible side effects of this medication including but not limited to increased risk for injection site reaction  Educated on importance of completing second dose of vaccine therapy and no sooner than 2 months but no greater than 6 months    Continue on current medications as previously prescribed   I spent 22 minutes in  direct face to face contact with patient.  Greater than 50% of this time was spent counseling patient and discussing plan of care.  Return in about 6 months (around 6/9/2021) for Annual physical.        This document has been electronically signed by DHARMESH Garza on December 9, 2020 15:32 CST

## 2020-12-10 DIAGNOSIS — K21.9 GASTROESOPHAGEAL REFLUX DISEASE WITHOUT ESOPHAGITIS: ICD-10-CM

## 2020-12-10 DIAGNOSIS — E55.9 VITAMIN D DEFICIENCY: ICD-10-CM

## 2020-12-10 RX ORDER — OMEGA-3-ACID ETHYL ESTERS 1 G/1
CAPSULE, LIQUID FILLED ORAL
Qty: 30 CAPSULE | Refills: 1 | Status: SHIPPED | OUTPATIENT
Start: 2020-12-10 | End: 2021-03-30

## 2020-12-10 RX ORDER — OMEPRAZOLE 40 MG/1
CAPSULE, DELAYED RELEASE ORAL
Qty: 30 CAPSULE | Refills: 1 | Status: SHIPPED | OUTPATIENT
Start: 2020-12-10 | End: 2021-03-30

## 2020-12-10 RX ORDER — ERGOCALCIFEROL 1.25 MG/1
CAPSULE ORAL
Qty: 4 CAPSULE | Refills: 48 | Status: SHIPPED | OUTPATIENT
Start: 2020-12-10 | End: 2021-12-14

## 2020-12-11 DIAGNOSIS — K21.9 GASTROESOPHAGEAL REFLUX DISEASE WITHOUT ESOPHAGITIS: ICD-10-CM

## 2020-12-11 DIAGNOSIS — Z13.29 SCREENING FOR HYPOTHYROIDISM: ICD-10-CM

## 2020-12-11 DIAGNOSIS — Z13.220 SCREENING FOR HYPERCHOLESTEROLEMIA: ICD-10-CM

## 2020-12-29 RX ORDER — BUSPIRONE HYDROCHLORIDE 5 MG/1
5 TABLET ORAL 3 TIMES DAILY PRN
Qty: 90 TABLET | Refills: 0 | Status: SHIPPED | OUTPATIENT
Start: 2020-12-29 | End: 2021-03-30

## 2021-01-07 DIAGNOSIS — F33.1 MODERATE EPISODE OF RECURRENT MAJOR DEPRESSIVE DISORDER (HCC): ICD-10-CM

## 2021-01-07 RX ORDER — ESCITALOPRAM OXALATE 20 MG/1
20 TABLET ORAL DAILY
Qty: 30 TABLET | Refills: 5 | Status: SHIPPED | OUTPATIENT
Start: 2021-01-07 | End: 2021-06-14

## 2021-03-29 DIAGNOSIS — K21.9 GASTROESOPHAGEAL REFLUX DISEASE WITHOUT ESOPHAGITIS: ICD-10-CM

## 2021-03-30 RX ORDER — OMEGA-3-ACID ETHYL ESTERS 1 G/1
CAPSULE, LIQUID FILLED ORAL
Qty: 30 CAPSULE | Refills: 1 | Status: SHIPPED | OUTPATIENT
Start: 2021-03-30 | End: 2021-05-25

## 2021-03-30 RX ORDER — BUSPIRONE HYDROCHLORIDE 5 MG/1
5 TABLET ORAL 3 TIMES DAILY PRN
Qty: 90 TABLET | Refills: 0 | Status: SHIPPED | OUTPATIENT
Start: 2021-03-30 | End: 2021-04-27

## 2021-03-30 RX ORDER — OMEPRAZOLE 40 MG/1
CAPSULE, DELAYED RELEASE ORAL
Qty: 30 CAPSULE | Refills: 1 | Status: SHIPPED | OUTPATIENT
Start: 2021-03-30 | End: 2021-05-25

## 2021-04-27 RX ORDER — BUSPIRONE HYDROCHLORIDE 5 MG/1
5 TABLET ORAL 3 TIMES DAILY PRN
Qty: 90 TABLET | Refills: 0 | Status: SHIPPED | OUTPATIENT
Start: 2021-04-27 | End: 2021-05-25

## 2021-05-25 DIAGNOSIS — K21.9 GASTROESOPHAGEAL REFLUX DISEASE WITHOUT ESOPHAGITIS: ICD-10-CM

## 2021-05-25 RX ORDER — BUSPIRONE HYDROCHLORIDE 5 MG/1
5 TABLET ORAL 3 TIMES DAILY PRN
Qty: 90 TABLET | Refills: 0 | Status: SHIPPED | OUTPATIENT
Start: 2021-05-25 | End: 2021-06-14

## 2021-05-25 RX ORDER — OMEGA-3-ACID ETHYL ESTERS 1 G/1
CAPSULE, LIQUID FILLED ORAL
Qty: 90 CAPSULE | Refills: 1 | Status: SHIPPED | OUTPATIENT
Start: 2021-05-25 | End: 2021-11-16

## 2021-05-25 RX ORDER — OMEPRAZOLE 40 MG/1
CAPSULE, DELAYED RELEASE ORAL
Qty: 90 CAPSULE | Refills: 1 | Status: SHIPPED | OUTPATIENT
Start: 2021-05-25 | End: 2021-11-16

## 2021-05-25 RX ORDER — BUSPIRONE HYDROCHLORIDE 5 MG/1
5 TABLET ORAL 3 TIMES DAILY PRN
Qty: 90 TABLET | Refills: 0 | Status: CANCELLED | OUTPATIENT
Start: 2021-05-25

## 2021-06-14 ENCOUNTER — OFFICE VISIT (OUTPATIENT)
Dept: FAMILY MEDICINE CLINIC | Facility: CLINIC | Age: 56
End: 2021-06-14

## 2021-06-14 VITALS
SYSTOLIC BLOOD PRESSURE: 108 MMHG | WEIGHT: 175 LBS | HEIGHT: 64 IN | BODY MASS INDEX: 29.88 KG/M2 | DIASTOLIC BLOOD PRESSURE: 68 MMHG

## 2021-06-14 DIAGNOSIS — R45.86 MOOD SWINGS: Primary | ICD-10-CM

## 2021-06-14 DIAGNOSIS — K64.9 HEMORRHOIDS, UNSPECIFIED HEMORRHOID TYPE: ICD-10-CM

## 2021-06-14 PROCEDURE — 99213 OFFICE O/P EST LOW 20 MIN: CPT | Performed by: NURSE PRACTITIONER

## 2021-06-14 RX ORDER — HYDROCORTISONE 25 MG/G
CREAM TOPICAL 2 TIMES DAILY
Qty: 30 G | Refills: 1 | Status: SHIPPED | OUTPATIENT
Start: 2021-06-14 | End: 2022-11-18

## 2021-06-14 RX ORDER — QUETIAPINE FUMARATE 25 MG/1
25 TABLET, FILM COATED ORAL NIGHTLY
Qty: 30 TABLET | Refills: 1 | Status: SHIPPED | OUTPATIENT
Start: 2021-06-14 | End: 2021-08-02

## 2021-06-14 NOTE — PROGRESS NOTES
"Chief Complaint  Med Refill (Discuss med change) and Depression    Subjective    Currently on Lexapro and Buspar.  \"I am still having a lot of highs and lows.\"  Two of her sister's husbands have passed away over the past year and her month fell and broke her clavicle.  Has family history of bipolar disorder.  Needs refill on her Anusol cream for hemorrhoids.        Natividad Montgomery presents to [facility]  Depression  Visit Type: follow-up  Patient presents with the following symptoms: decreased concentration, depressed mood, excessive worry, feelings of hopelessness, irritability, nervousness/anxiety and restlessness.  Patient is not experiencing: suicidal ideas, suicidal planning and thoughts of death.  Frequency of symptoms: constantly   Severity: causing significant distress   Sleep quality: fair  Nighttime awakenings: one to two        Objective   Vital Signs:   /68   Ht 162.6 cm (64\")   Wt 79.4 kg (175 lb)   BMI 30.04 kg/m²     Physical Exam  Vitals and nursing note reviewed.   Constitutional:       Appearance: She is well-developed.   HENT:      Head: Normocephalic.   Cardiovascular:      Rate and Rhythm: Normal rate and regular rhythm.      Heart sounds: Normal heart sounds.   Pulmonary:      Effort: Pulmonary effort is normal.      Breath sounds: Normal breath sounds.   Musculoskeletal:         General: Normal range of motion.   Skin:     General: Skin is warm.   Neurological:      Mental Status: She is alert and oriented to person, place, and time.   Psychiatric:         Mood and Affect: Mood is depressed.         Speech: Speech normal.         Behavior: Behavior normal.         Thought Content: Thought content normal.        Result Review :                   Assessment and Plan    Diagnoses and all orders for this visit:    1. Mood swings (Primary)  -     QUEtiapine (SEROquel) 25 MG tablet; Take 1 tablet by mouth Every Night.  Dispense: 30 tablet; Refill: 1  -     " Ambulatory Referral to Psychiatry    2. Hemorrhoids, unspecified hemorrhoid type  -     Hydrocortisone, Perianal, (ANUSOL-HC) 2.5 % rectal cream; Insert  into the rectum 2 (Two) Times a Day.  Dispense: 30 g; Refill: 1      Continue on current medications as previously prescribed and refill prescription for Anusol HC sent to pharmacy  Discontinue BuSpar and Lexapro as previously prescribed and tapering instructions provided  Begin Seroquel as prescribed  Educated on possible side effects of this medication including not limited to increased risk for worsening of depression, thoughts of self-harm, somnolence, vision changes  Encouraged to seek emergency medical treatment for any new or worsening thoughts of hopelessness, helplessness or self-harm    Follow Up   Return in about 5 weeks (around 7/19/2021) for Annual physical.  Patient was given instructions and counseling regarding her condition or for health maintenance advice. Please see specific information pulled into the AVS if appropriate.         This document has been electronically signed by DHARMESH Garza on June 14, 2021 13:23 CDT

## 2021-06-14 NOTE — PATIENT INSTRUCTIONS
Taper Lexapro:  Take 1/2 tablet every day for 1 week then take 1/2 tablet every other day for 1 week.  In the third week take 1/2 tablet every 2 days and in the fourth week take 1/2 tablet every 3 days    Taper Buspar:  Take 1/2 tablet every day for 1 week then take 1/2 tablet every other day for 1 week.  In the third week take 1/2 tablet every 2 days and in the fourth week take 1/2 tablet every 3 days

## 2021-07-19 ENCOUNTER — OFFICE VISIT (OUTPATIENT)
Dept: FAMILY MEDICINE CLINIC | Facility: CLINIC | Age: 56
End: 2021-07-19

## 2021-07-19 ENCOUNTER — LAB (OUTPATIENT)
Dept: LAB | Facility: HOSPITAL | Age: 56
End: 2021-07-19

## 2021-07-19 VITALS
SYSTOLIC BLOOD PRESSURE: 122 MMHG | DIASTOLIC BLOOD PRESSURE: 76 MMHG | WEIGHT: 170.6 LBS | HEIGHT: 64 IN | BODY MASS INDEX: 29.12 KG/M2

## 2021-07-19 DIAGNOSIS — K21.9 GASTROESOPHAGEAL REFLUX DISEASE WITHOUT ESOPHAGITIS: ICD-10-CM

## 2021-07-19 DIAGNOSIS — Z13.220 SCREENING FOR HYPERCHOLESTEROLEMIA: ICD-10-CM

## 2021-07-19 DIAGNOSIS — Z13.29 SCREENING FOR HYPOTHYROIDISM: ICD-10-CM

## 2021-07-19 DIAGNOSIS — R45.86 MOOD SWINGS: Primary | ICD-10-CM

## 2021-07-19 LAB
ALBUMIN SERPL-MCNC: 4.5 G/DL (ref 3.5–5.2)
ALBUMIN/GLOB SERPL: 2.1 G/DL
ALP SERPL-CCNC: 86 U/L (ref 39–117)
ALT SERPL W P-5'-P-CCNC: 12 U/L (ref 1–33)
ANION GAP SERPL CALCULATED.3IONS-SCNC: 11.1 MMOL/L (ref 5–15)
AST SERPL-CCNC: 14 U/L (ref 1–32)
BASOPHILS # BLD AUTO: 0.01 10*3/MM3 (ref 0–0.2)
BASOPHILS NFR BLD AUTO: 0.2 % (ref 0–1.5)
BILIRUB SERPL-MCNC: 0.4 MG/DL (ref 0–1.2)
BUN SERPL-MCNC: 9 MG/DL (ref 6–20)
BUN/CREAT SERPL: 12.9 (ref 7–25)
CALCIUM SPEC-SCNC: 9.3 MG/DL (ref 8.6–10.5)
CHLORIDE SERPL-SCNC: 104 MMOL/L (ref 98–107)
CHOLEST SERPL-MCNC: 196 MG/DL (ref 0–200)
CO2 SERPL-SCNC: 25.9 MMOL/L (ref 22–29)
CREAT SERPL-MCNC: 0.7 MG/DL (ref 0.57–1)
DEPRECATED RDW RBC AUTO: 44 FL (ref 37–54)
EOSINOPHIL # BLD AUTO: 0.25 10*3/MM3 (ref 0–0.4)
EOSINOPHIL NFR BLD AUTO: 5.1 % (ref 0.3–6.2)
ERYTHROCYTE [DISTWIDTH] IN BLOOD BY AUTOMATED COUNT: 13.1 % (ref 12.3–15.4)
GFR SERPL CREATININE-BSD FRML MDRD: 86 ML/MIN/1.73
GLOBULIN UR ELPH-MCNC: 2.1 GM/DL
GLUCOSE SERPL-MCNC: 105 MG/DL (ref 65–99)
HCT VFR BLD AUTO: 41.6 % (ref 34–46.6)
HDLC SERPL-MCNC: 51 MG/DL (ref 40–60)
HGB BLD-MCNC: 14.4 G/DL (ref 12–15.9)
IMM GRANULOCYTES # BLD AUTO: 0.01 10*3/MM3 (ref 0–0.05)
IMM GRANULOCYTES NFR BLD AUTO: 0.2 % (ref 0–0.5)
LDLC SERPL CALC-MCNC: 125 MG/DL (ref 0–100)
LDLC/HDLC SERPL: 2.4 {RATIO}
LYMPHOCYTES # BLD AUTO: 2.1 10*3/MM3 (ref 0.7–3.1)
LYMPHOCYTES NFR BLD AUTO: 42.9 % (ref 19.6–45.3)
MCH RBC QN AUTO: 31.5 PG (ref 26.6–33)
MCHC RBC AUTO-ENTMCNC: 34.6 G/DL (ref 31.5–35.7)
MCV RBC AUTO: 91 FL (ref 79–97)
MONOCYTES # BLD AUTO: 0.27 10*3/MM3 (ref 0.1–0.9)
MONOCYTES NFR BLD AUTO: 5.5 % (ref 5–12)
NEUTROPHILS NFR BLD AUTO: 2.26 10*3/MM3 (ref 1.7–7)
NEUTROPHILS NFR BLD AUTO: 46.1 % (ref 42.7–76)
NRBC BLD AUTO-RTO: 0 /100 WBC (ref 0–0.2)
PLATELET # BLD AUTO: 312 10*3/MM3 (ref 140–450)
PMV BLD AUTO: 10.2 FL (ref 6–12)
POTASSIUM SERPL-SCNC: 4 MMOL/L (ref 3.5–5.2)
PROT SERPL-MCNC: 6.6 G/DL (ref 6–8.5)
RBC # BLD AUTO: 4.57 10*6/MM3 (ref 3.77–5.28)
SODIUM SERPL-SCNC: 141 MMOL/L (ref 136–145)
TRIGL SERPL-MCNC: 112 MG/DL (ref 0–150)
TSH SERPL DL<=0.05 MIU/L-ACNC: 1.41 UIU/ML (ref 0.27–4.2)
VLDLC SERPL-MCNC: 20 MG/DL (ref 5–40)
WBC # BLD AUTO: 4.9 10*3/MM3 (ref 3.4–10.8)

## 2021-07-19 PROCEDURE — 99212 OFFICE O/P EST SF 10 MIN: CPT | Performed by: NURSE PRACTITIONER

## 2021-07-19 PROCEDURE — 80050 GENERAL HEALTH PANEL: CPT

## 2021-07-19 PROCEDURE — 80061 LIPID PANEL: CPT

## 2021-07-19 RX ORDER — ESCITALOPRAM OXALATE 20 MG/1
20 TABLET ORAL DAILY
COMMUNITY
Start: 2021-06-29 | End: 2021-07-19

## 2021-07-19 NOTE — PROGRESS NOTES
"Chief Complaint  mood swings (5 week check up)    Subjective   Was placed on Seroquel at previous visit.  \"I feel so much better.  It am sleeping better at night and I am waking up early in the morning feeling good.\"         Natividad Montgomery presents to [facility]  Depression  Visit Type: follow-up  Patient presents with the following symptoms: decreased concentration, depressed mood, excessive worry, irritability and nervousness/anxiety.  Patient is not experiencing: feelings of hopelessness, restlessness, suicidal ideas, suicidal planning and thoughts of death.  Frequency of symptoms: occasionally   Severity: moderate   Sleep quality: fair  Nighttime awakenings: one to two        Objective   Vital Signs:   /76   Ht 162.6 cm (64\")   Wt 77.4 kg (170 lb 9.6 oz)   BMI 29.28 kg/m²     Physical Exam  Vitals and nursing note reviewed.   Constitutional:       Appearance: She is well-developed.   Cardiovascular:      Rate and Rhythm: Normal rate and regular rhythm.      Heart sounds: Normal heart sounds.   Pulmonary:      Effort: Pulmonary effort is normal.      Breath sounds: Normal breath sounds.   Musculoskeletal:         General: Normal range of motion.   Skin:     General: Skin is warm.   Neurological:      Mental Status: She is alert and oriented to person, place, and time.   Psychiatric:         Behavior: Behavior normal.        Result Review :                   Assessment and Plan    Diagnoses and all orders for this visit:    1. Mood swings (Primary)    2. Gastroesophageal reflux disease without esophagitis  -     CBC & Differential; Future  -     Comprehensive Metabolic Panel; Future    3. Screening for hypothyroidism  -     TSH; Future    4. Screening for hypercholesterolemia  -     Lipid Panel; Future      1.  Mood swings:  Continue on Seroquel as previously prescribed  Continue with stress reducing techniques as previously discussed  Encouraged to seek emergency " medical treatment for any new or worsening thoughts of hopelessness, helplessness or self-harm    2.  Gastroesophageal reflux disease without esophagitis:  Continue on Prilosec as previously prescribed  Reeducated on possible side effects of long-term use of PPIs including but not limited to increased risk for vitamin B12 deficiency and hypomagnesemia  Complete CBC and chemistry panel as ordered and will notify of results when available    3.  Screening for hypothyroidism:  Complete TSH as ordered and will notify of results when available    4.  Screening for hypercholesterolemia:  Complete fasting lipid panel as ordered and will notify of results when available  Encouraged adhere to a low-fat diet    I spent 11 minutes caring for Natividad on this date of service. This time includes time spent by me in the following activities:preparing for the visit, reviewing tests, obtaining and/or reviewing a separately obtained history, performing a medically appropriate examination and/or evaluation , counseling and educating the patient/family/caregiver, ordering medications, tests, or procedures and documenting information in the medical record  Follow Up   Return in about 3 months (around 10/19/2021) for Annual physical.  Patient was given instructions and counseling regarding her condition or for health maintenance advice. Please see specific information pulled into the AVS if appropriate.         This document has been electronically signed by DHARMESH Garza on July 19, 2021 11:12 CDT

## 2021-07-20 ENCOUNTER — TELEPHONE (OUTPATIENT)
Dept: FAMILY MEDICINE CLINIC | Facility: CLINIC | Age: 56
End: 2021-07-20

## 2021-07-20 NOTE — TELEPHONE ENCOUNTER
Per DHARMESH Gonzalez, Ms. Montgomery has been called with recent lab results & recommendations.  Continue current medications and follow-up as planned or sooner if any problems.       ----- Message from DHARMESH Garza sent at 7/20/2021  7:04 AM CDT -----  Bad cholesterol remain slightly elevated but has improved.  She needs to continue to adhere to a low-fat diet and increase exercise such as walking.  Glucose was slightly elevated at 105.  She needs to avoid concentrated sweets and lower her carbohydrate intake.  All other labs were essentially normal.

## 2021-07-20 NOTE — PROGRESS NOTES
Per DHARMESH Gonzalez, Ms. Montgomery has been called with recent lab results & recommendations.  Continue current medications and follow-up as planned or sooner if any problems.

## 2021-08-02 DIAGNOSIS — R45.86 MOOD SWINGS: ICD-10-CM

## 2021-08-02 RX ORDER — QUETIAPINE FUMARATE 25 MG/1
25 TABLET, FILM COATED ORAL NIGHTLY
Qty: 30 TABLET | Refills: 1 | Status: SHIPPED | OUTPATIENT
Start: 2021-08-02 | End: 2021-10-01

## 2021-08-09 ENCOUNTER — TELEPHONE (OUTPATIENT)
Dept: FAMILY MEDICINE CLINIC | Facility: CLINIC | Age: 56
End: 2021-08-09

## 2021-08-09 DIAGNOSIS — Z12.31 ENCOUNTER FOR SCREENING MAMMOGRAM FOR MALIGNANT NEOPLASM OF BREAST: Primary | ICD-10-CM

## 2021-08-09 NOTE — TELEPHONE ENCOUNTER
This is the patient that I told you about.  She said, she has already seen you and supposed to have an apt for a breast exam???

## 2021-09-20 ENCOUNTER — OFFICE VISIT (OUTPATIENT)
Dept: OBSTETRICS AND GYNECOLOGY | Facility: CLINIC | Age: 56
End: 2021-09-20

## 2021-09-20 VITALS
DIASTOLIC BLOOD PRESSURE: 76 MMHG | BODY MASS INDEX: 28.51 KG/M2 | SYSTOLIC BLOOD PRESSURE: 120 MMHG | WEIGHT: 167 LBS | HEIGHT: 64 IN

## 2021-09-20 DIAGNOSIS — Z12.31 ENCOUNTER FOR SCREENING MAMMOGRAM FOR MALIGNANT NEOPLASM OF BREAST: ICD-10-CM

## 2021-09-20 DIAGNOSIS — Z01.419 WOMEN'S ANNUAL ROUTINE GYNECOLOGICAL EXAMINATION: Primary | ICD-10-CM

## 2021-09-20 PROCEDURE — 99396 PREV VISIT EST AGE 40-64: CPT | Performed by: NURSE PRACTITIONER

## 2021-09-20 NOTE — PROGRESS NOTES
Subjective   Natividad Montgomery is a 56 y.o. Annual gynecological exam    Mammo: BI-RADS 1, 2019  Pap: NIL, negative hrHPV 2018  Supracervical hysterectomy    Pt presents for annual gynecological exam with no complaints.  She denies history of abnormal mammograms and pap smears.      Gynecologic Exam  The patient's pertinent negatives include no genital itching, genital lesions, genital odor, genital rash, pelvic pain, vaginal bleeding or vaginal discharge. The patient is experiencing no pain. Pertinent negatives include no abdominal pain, chills, constipation, diarrhea, dysuria, fever, flank pain, frequency, headaches, hematuria, rash or urgency. She is sexually active. No, her partner does not have an STD. She uses hysterectomy for contraception. Her past medical history is significant for menorrhagia.       The following portions of the patient's history were reviewed and updated as appropriate: allergies, current medications, past family history, past medical history, past social history, past surgical history and problem list.    Review of Systems   Constitutional: Negative for chills, diaphoresis, fatigue, fever and unexpected weight change.   Respiratory: Negative for apnea, chest tightness and shortness of breath.    Cardiovascular: Negative for chest pain, palpitations and leg swelling.   Gastrointestinal: Negative for abdominal distention, abdominal pain, constipation and diarrhea.   Genitourinary: Positive for menorrhagia. Negative for decreased urine volume, difficulty urinating, dysuria, enuresis, flank pain, frequency, genital sores, hematuria, pelvic pain, urgency, vaginal bleeding, vaginal discharge and vaginal pain.   Skin: Negative for rash.   Neurological: Negative for headaches.   Psychiatric/Behavioral: Negative for sleep disturbance and suicidal ideas.         Objective   Physical Exam  Vitals and nursing note reviewed. Exam conducted with a chaperone present.   Constitutional:       General:  She is awake. She is not in acute distress.     Appearance: Normal appearance. She is well-developed and well-groomed. She is not ill-appearing, toxic-appearing or diaphoretic.   Neck:      Thyroid: No thyroid mass, thyromegaly or thyroid tenderness.   Cardiovascular:      Rate and Rhythm: Normal rate and regular rhythm.      Heart sounds: Normal heart sounds.   Pulmonary:      Effort: Pulmonary effort is normal.      Breath sounds: Normal breath sounds.   Chest:      Breasts: Dylan Score is 5. Breasts are symmetrical.         Right: Normal. No swelling, bleeding, inverted nipple, mass, nipple discharge, skin change or tenderness.         Left: Normal. No swelling, bleeding, inverted nipple, mass, nipple discharge, skin change or tenderness.   Abdominal:      General: Bowel sounds are normal. There is no distension.      Palpations: Abdomen is soft.      Tenderness: There is no abdominal tenderness.   Genitourinary:     General: Normal vulva.      Exam position: Lithotomy position.      Dylan stage (genital): 5.      Labia:         Right: No rash, tenderness, lesion or injury.         Left: No rash, tenderness, lesion or injury.       Urethra: No prolapse, urethral pain, urethral swelling or urethral lesion.      Vagina: Normal.      Cervix: Normal.      Uterus: Absent.       Adnexa:         Right: No mass, tenderness or fullness.          Left: No mass, tenderness or fullness.        Comments: Pap smear obtained, adnexa non-palpable   Lymphadenopathy:      Upper Body:      Right upper body: No supraclavicular, axillary or pectoral adenopathy.      Left upper body: No supraclavicular, axillary or pectoral adenopathy.      Lower Body: No right inguinal adenopathy. No left inguinal adenopathy.   Skin:     General: Skin is warm and dry.   Neurological:      Mental Status: She is alert and oriented to person, place, and time.      Gait: Gait is intact.   Psychiatric:         Attention and Perception: Attention and  perception normal.         Mood and Affect: Mood and affect normal.         Speech: Speech normal.         Behavior: Behavior normal. Behavior is cooperative.           Assessment/Plan   Diagnoses and all orders for this visit:    1. Women's annual routine gynecological examination (Primary)  -     Liquid-based Pap Smear, Screening    2. Encounter for screening mammogram for malignant neoplasm of breast        Patient educated and encouraged to do monthly self breast exam.  Encouraged smoking cessation.  If pap smear is normal patient will receive a letter in the mail in about two weeks.  If pap smear is abnormal we will call patient and follow up with plan.  Next pap due 5 years if normal today per ASCCP guidelines.  RTC in 1 year for annual gynecological exam or sooner if needed.

## 2021-09-23 LAB
LAB AP CASE REPORT: NORMAL
PATH INTERP SPEC-IMP: NORMAL

## 2021-10-01 DIAGNOSIS — R45.86 MOOD SWINGS: ICD-10-CM

## 2021-10-01 RX ORDER — QUETIAPINE FUMARATE 25 MG/1
25 TABLET, FILM COATED ORAL NIGHTLY
Qty: 90 TABLET | Refills: 1 | Status: SHIPPED | OUTPATIENT
Start: 2021-10-01 | End: 2021-10-20

## 2021-10-01 NOTE — TELEPHONE ENCOUNTER
Incoming Refill Request      Medication requested (name and dose):  Seroquel 25mg     Pharmacy where request should be sent: [facility]    Additional details provided by patient: Patient states she is going out of town [place]  10/03/2021 she said the pharmacy told her to get approval from the office before they will fill it.     Best call back number: [phone]    Does the patient have less than a 3 day supply:  [] Yes  [x] No    Adele Sheehan  10/01/21, 13:54 CDT

## 2021-10-18 DIAGNOSIS — M25.511 ACUTE PAIN OF RIGHT SHOULDER: Primary | ICD-10-CM

## 2021-10-19 ENCOUNTER — OFFICE VISIT (OUTPATIENT)
Dept: ORTHOPEDIC SURGERY | Facility: CLINIC | Age: 56
End: 2021-10-19

## 2021-10-19 VITALS — HEIGHT: 64 IN | BODY MASS INDEX: 27.66 KG/M2 | WEIGHT: 162 LBS

## 2021-10-19 DIAGNOSIS — G89.29 CHRONIC PAIN IN RIGHT SHOULDER: Primary | ICD-10-CM

## 2021-10-19 DIAGNOSIS — M24.811 INTERNAL DERANGEMENT OF RIGHT SHOULDER: ICD-10-CM

## 2021-10-19 DIAGNOSIS — M25.511 CHRONIC PAIN IN RIGHT SHOULDER: Primary | ICD-10-CM

## 2021-10-19 DIAGNOSIS — M75.101 ROTATOR CUFF SYNDROME, RIGHT: ICD-10-CM

## 2021-10-19 PROCEDURE — 99214 OFFICE O/P EST MOD 30 MIN: CPT | Performed by: NURSE PRACTITIONER

## 2021-10-19 PROCEDURE — 20610 DRAIN/INJ JOINT/BURSA W/O US: CPT | Performed by: NURSE PRACTITIONER

## 2021-10-19 RX ORDER — MELOXICAM 15 MG/1
15 TABLET ORAL DAILY
Qty: 30 TABLET | Refills: 1 | Status: SHIPPED | OUTPATIENT
Start: 2021-10-19 | End: 2021-11-23 | Stop reason: SDUPTHER

## 2021-10-19 RX ORDER — TRIAMCINOLONE ACETONIDE 40 MG/ML
40 INJECTION, SUSPENSION INTRA-ARTICULAR; INTRAMUSCULAR
Status: COMPLETED | OUTPATIENT
Start: 2021-10-19 | End: 2021-10-19

## 2021-10-19 RX ORDER — LIDOCAINE HYDROCHLORIDE 10 MG/ML
2 INJECTION, SOLUTION INFILTRATION; PERINEURAL
Status: COMPLETED | OUTPATIENT
Start: 2021-10-19 | End: 2021-10-19

## 2021-10-19 RX ADMIN — TRIAMCINOLONE ACETONIDE 40 MG: 40 INJECTION, SUSPENSION INTRA-ARTICULAR; INTRAMUSCULAR at 13:21

## 2021-10-19 RX ADMIN — LIDOCAINE HYDROCHLORIDE 2 ML: 10 INJECTION, SOLUTION INFILTRATION; PERINEURAL at 13:21
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
## 2021-10-20 ENCOUNTER — OFFICE VISIT (OUTPATIENT)
Dept: FAMILY MEDICINE CLINIC | Facility: CLINIC | Age: 56
End: 2021-10-20

## 2021-10-20 VITALS
OXYGEN SATURATION: 98 % | BODY MASS INDEX: 27.66 KG/M2 | WEIGHT: 162 LBS | SYSTOLIC BLOOD PRESSURE: 132 MMHG | HEART RATE: 74 BPM | DIASTOLIC BLOOD PRESSURE: 74 MMHG | HEIGHT: 64 IN

## 2021-10-20 DIAGNOSIS — Z23 NEED FOR 23-POLYVALENT PNEUMOCOCCAL POLYSACCHARIDE VACCINE: ICD-10-CM

## 2021-10-20 DIAGNOSIS — F31.9 BIPOLAR DEPRESSION (HCC): ICD-10-CM

## 2021-10-20 DIAGNOSIS — Z00.00 ANNUAL PHYSICAL EXAM: Primary | ICD-10-CM

## 2021-10-20 DIAGNOSIS — Z23 NEED FOR IMMUNIZATION AGAINST INFLUENZA: ICD-10-CM

## 2021-10-20 DIAGNOSIS — K21.9 GASTROESOPHAGEAL REFLUX DISEASE WITHOUT ESOPHAGITIS: ICD-10-CM

## 2021-10-20 PROCEDURE — 90686 IIV4 VACC NO PRSV 0.5 ML IM: CPT | Performed by: NURSE PRACTITIONER

## 2021-10-20 PROCEDURE — 90732 PPSV23 VACC 2 YRS+ SUBQ/IM: CPT | Performed by: NURSE PRACTITIONER

## 2021-10-20 PROCEDURE — 90472 IMMUNIZATION ADMIN EACH ADD: CPT | Performed by: NURSE PRACTITIONER

## 2021-10-20 PROCEDURE — 90471 IMMUNIZATION ADMIN: CPT | Performed by: NURSE PRACTITIONER

## 2021-10-20 PROCEDURE — 99396 PREV VISIT EST AGE 40-64: CPT | Performed by: NURSE PRACTITIONER

## 2021-10-20 RX ORDER — QUETIAPINE FUMARATE 50 MG/1
50 TABLET, FILM COATED ORAL NIGHTLY
Qty: 30 TABLET | Refills: 2 | Status: SHIPPED | OUTPATIENT
Start: 2021-10-20 | End: 2022-02-08

## 2021-10-20 NOTE — PROGRESS NOTES
"Chief Complaint  Annual Exam, Hyperlipidemia, and Manic Behavior    Subjective  Annual physical exam.  Has bipolar disorder and GERD.  \"I am having more manic days over the last several months.  Not having any depression but I am just up.\"        Natividad Montgomery presents to [facility] [place]  HPI:  Annual physical exam     Depression  Visit Type: follow-up  Patient presents with the following symptoms: depressed mood, excessive worry, irritability and nervousness/anxiety.  Patient is not experiencing: confusion, shortness of breath, suicidal ideas, suicidal planning and thoughts of death.  Frequency of symptoms: most days   Severity: moderate   Sleep quality: fair  Nighttime awakenings: occasional  Compliance with medications:  %        Heartburn  She complains of heartburn. This is a chronic problem. The current episode started more than 1 year ago. The problem occurs rarely. The heartburn duration is several minutes. The heartburn is located in the substernum. The heartburn does not wake her from sleep. The heartburn does not limit her activity. The heartburn doesn't change with position. The symptoms are aggravated by certain foods and stress. Risk factors include obesity, NSAIDs, lack of exercise and caffeine use. She has tried a PPI for the symptoms. The treatment provided significant relief.       Objective   Vital Signs:   /74   Pulse 74   Ht 162.6 cm (64\")   Wt 73.5 kg (162 lb)   SpO2 98%   BMI 27.81 kg/m²     Physical Exam  Vitals and nursing note reviewed.   Constitutional:       Appearance: Normal appearance. She is well-developed and overweight.   HENT:      Head: Normocephalic.      Right Ear: Tympanic membrane, ear canal and external ear normal.      Left Ear: Tympanic membrane, ear canal and external ear normal.      Mouth/Throat:      Mouth: Mucous membranes are moist.      Pharynx: Oropharynx is clear.   Eyes:      Extraocular " Movements: Extraocular movements intact.      Conjunctiva/sclera: Conjunctivae normal.      Pupils: Pupils are equal, round, and reactive to light.   Cardiovascular:      Rate and Rhythm: Normal rate and regular rhythm.      Heart sounds: Normal heart sounds.   Pulmonary:      Effort: Pulmonary effort is normal.      Breath sounds: Normal breath sounds.   Chest:      Comments: Breast exam deferred to OB/GYN  Abdominal:      General: Bowel sounds are normal.      Palpations: Abdomen is soft.   Genitourinary:     Comments: Screening for cervical cancer exam deferred to OB/GYN  Musculoskeletal:         General: Normal range of motion.      Cervical back: Normal range of motion and neck supple.   Skin:     General: Skin is warm.      Capillary Refill: Capillary refill takes less than 2 seconds.   Neurological:      Mental Status: She is alert and oriented to person, place, and time.   Psychiatric:         Behavior: Behavior normal.        Result Review :     Common labs    Common Labsle 7/19/21 7/19/21 7/19/21    1123 1123 1123   Glucose  105 (A)    BUN  9    Creatinine  0.70    eGFR Non African Am  86    Sodium  141    Potassium  4.0    Chloride  104    Calcium  9.3    Albumin  4.50    Total Bilirubin  0.4    Alkaline Phosphatase  86    AST (SGOT)  14    ALT (SGPT)  12    WBC 4.90     Hemoglobin 14.4     Hematocrit 41.6     Platelets 312     Total Cholesterol   196   Triglycerides   112   HDL Cholesterol   51   LDL Cholesterol    125 (A)   (A) Abnormal value                      Assessment and Plan    Diagnoses and all orders for this visit:    1. Annual physical exam (Primary)    2. Gastroesophageal reflux disease without esophagitis    3. Bipolar depression (HCC)  -     QUEtiapine (SEROquel) 50 MG tablet; Take 1 tablet by mouth Every Night.  Dispense: 30 tablet; Refill: 2    4. Need for immunization against influenza  -     FluLaval/Fluarix/Fluzone >6 Months (9815-1400)    5. Need for 23-polyvalent pneumococcal  polysaccharide vaccine  -     Pneumococcal Polysaccharide Vaccine 23-Valent Greater Than or Equal To 3yo Subcutaneous / IM      1.  Annual physical exam:  Continue on current medications as previously prescribed   Counseled on importance of healthy eating habits and regular physical activity regimen on improving overall physical mental health    2.  Gastroesophageal reflux disease without esophagitis:  Continue omeprazole as previously prescribed  Reeducated on possible side effects of long-term use of PPIs including but not limited to increased risk for osteoporosis  Discussed avoiding triggers such as stress, alcohol and spicy foods and can see what after.  Elevate head of bed 15 degrees to reduce nighttime flareups    3.  Bipolar depression:  Increase Seroquel from 25 mg p.o. nightly to 50 mg p.o. nightly  Discussed importance of getting a minimum of 8 hours of sleep per night  Discussed importance of staying well-hydrated  Will contact this office in 4 weeks to give symptom update and if no improvement or worsening will follow up in office at that time  Seek emergency medical treatment for any new or worsening thoughts of hopelessness, helplessness or self-harm    4.  Need for immunization against influenza:  Influenza vaccine given IM in office  Educated on possible side effects of this medication including but not limited to pain, swelling and redness of injection site as well as flulike symptoms    5.  Need for 23 polyvalent pneumococcal polysaccharide vaccine:  Pneumovax 23 given IM in office  Educated on possible side effects of this medication including not limited to increased risk for injection site reaction    I spent 28 minutes caring for Natividad on this date of service. This time includes time spent by me in the following activities:preparing for the visit, reviewing tests, obtaining and/or reviewing a separately obtained history, performing a medically appropriate examination and/or evaluation ,  counseling and educating the patient/family/caregiver, ordering medications, tests, or procedures and documenting information in the medical record  Follow Up   Return in about 6 months (around 4/20/2022) for Recheck.  Patient was given instructions and counseling regarding her condition or for health maintenance advice. Please see specific information pulled into the AVS if appropriate.         This document has been electronically signed by DHARMESH Garza on October 21, 2021 06:35 CDT   Until I actually hit it until I have my chart and I.using it

## 2021-10-26 ENCOUNTER — APPOINTMENT (OUTPATIENT)
Dept: PHYSICAL THERAPY | Facility: HOSPITAL | Age: 56
End: 2021-10-26

## 2021-10-29 ENCOUNTER — HOSPITAL ENCOUNTER (OUTPATIENT)
Dept: PHYSICAL THERAPY | Facility: HOSPITAL | Age: 56
Setting detail: THERAPIES SERIES
Discharge: HOME | End: 2021-10-29

## 2021-10-29 DIAGNOSIS — M25.511 CHRONIC PAIN IN RIGHT SHOULDER: Primary | ICD-10-CM

## 2021-10-29 DIAGNOSIS — G89.29 CHRONIC PAIN IN RIGHT SHOULDER: Primary | ICD-10-CM

## 2021-10-29 DIAGNOSIS — M24.811 INTERNAL DERANGEMENT OF RIGHT SHOULDER: ICD-10-CM

## 2021-10-29 PROCEDURE — 97162 PT EVAL MOD COMPLEX 30 MIN: CPT
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
## 2021-11-08 ENCOUNTER — HOSPITAL ENCOUNTER (OUTPATIENT)
Dept: PHYSICAL THERAPY | Facility: HOSPITAL | Age: 56
Setting detail: THERAPIES SERIES
Discharge: HOME | End: 2021-11-08

## 2021-11-08 DIAGNOSIS — M24.811 INTERNAL DERANGEMENT OF RIGHT SHOULDER: ICD-10-CM

## 2021-11-08 DIAGNOSIS — G89.29 CHRONIC PAIN IN RIGHT SHOULDER: Primary | ICD-10-CM

## 2021-11-08 DIAGNOSIS — M25.511 CHRONIC PAIN IN RIGHT SHOULDER: Primary | ICD-10-CM

## 2021-11-08 PROCEDURE — 97110 THERAPEUTIC EXERCISES: CPT

## 2021-11-08 NOTE — THERAPY TREATMENT NOTE
Outpatient Physical Therapy Ortho Treatment Note  [facility]     Patient Name: Natividad Montgomery  : 1965  MRN: [id number]  Today's Date: 2021      Visit Date: 2021   Attendance: 2/2 (2 per MD. 20/year per insurance)  Subjective Improvement: n/a  Next MD Visit: 2021  Recert Date: 2021     Therapy Diagnosis: Right shoulder pain    Visit Dx:    ICD-10-CM ICD-9-CM   1. Chronic pain in right shoulder  M25.511 719.41    G89.29 338.29   2. Internal derangement of right shoulder  M24.811 719.81       Patient Active Problem List   Diagnosis   • Chronic pain of right knee   • Cyst of right knee joint   • Depression   • Patellofemoral arthritis of right knee   • GERD (gastroesophageal reflux disease)   • Acute pain of right shoulder        Past Medical History:   Diagnosis Date   • Allergic    • Anxiety    • Arthritis    • Breast cyst    • Bronchitis    • Colon polyp    • Depression    • DVT (deep venous thrombosis) ([facility])    • Fibrocystic breast    • GERD (gastroesophageal reflux disease)    • Hard to intubate    • Headache    • Strep throat    • Urinary tract infection         Past Surgical History:   Procedure Laterality Date   •  SECTION     • COLONOSCOPY     • EXCISION MASS LEG Right 2018    Procedure: EXCISION MASS RIGHT KNEE                             (LATEX ALLERGY);  Surgeon: Jose David Desai MD;  Location: [facility];  Service: Orthopedics   • SHOULDER SURGERY      right shoulder   • SUBTOTAL HYSTERECTOMY     • TONSILLECTOMY     • VAGINAL DILATATION      age 15        PT Ortho     Row Name 21 1400       Subjective Comments    Subjective Comments Pt stated that she has been compliant with her HEP and has no questions on her current HEP. She stated that her shoulder is sorer today. Her mom fell this past weekend and she had to help her.  -       Precautions and Contraindications    Contraindications Latex allergy  -       Subjective Pain     Able to rate subjective pain? yes  -    Pre-Treatment Pain Level 5  -    Post-Treatment Pain Level 5  -       Posture/Observations    Posture/Observations Comments Posture: Mild forward head, rounded shoulders, slight increase in thoracic kyphosis.  -          User Key  (r) = Recorded By, (t) = Taken By, (c) = Cosigned By    Initials Name Provider Type    Sara Millan PT Physical Therapist                             PT Assessment/Plan     Row Name 11/08/21 1400          PT Assessment    Functional Limitations Limitation in home management; Performance in self-care ADL  -     Impairments Impaired flexibility; Impaired muscle endurance; Impaired muscle length; Impaired muscle power; Muscle strength; Pain; Posture; Range of motion  -     Assessment Comments Reviewed pt's HEP from last visit. She required verbal and visual cuing for correct form. Added tband 6-way and scap retractions this date to her HEP. Pt required frequent verbal and tactile cueing, She was distributed a green tband and a set of pulleys for her HEP. Pt stated that she understood her new HEP. She may benefit from one additional visit to reinforce her current HEP as pt appeared to get form mixed up this date. Pt informed that if she wishes to cont with PT, she must been seen by the 24th of this month or she will need a new order. Pt stated she understood.  -     Rehab Potential Good  -     Patient/caregiver participated in establishment of treatment plan and goals Yes  -            PT Plan    PT Frequency 1x/week  -     Predicted Duration of Therapy Intervention (PT) PRN or 1 more visit  -     PT Plan Comments Review HEP and advance if pt returns. D/C if not seen by 11/24/2021.  -           User Key  (r) = Recorded By, (t) = Taken By, (c) = Cosigned By    Initials Name Provider Type    Sara Millan, PT Physical Therapist                   OP Exercises     Row Name 11/08/21 1400             Subjective Comments     "Subjective Comments Pt stated that she has been compliant with her HEP and has no questions on her current HEP. She stated that her shoulder is sorer today. Her mom fell this past weekend and she had to help her.  -MH              Subjective Pain    Able to rate subjective pain? yes  -MH      Pre-Treatment Pain Level 5  -MH      Post-Treatment Pain Level 5  -MH              Total Minutes    88640 - PT Therapeutic Exercise Minutes 45  -MH              Exercise 1    Exercise Name 1 UT stretch  -MH      Cueing 1 Verbal; Demo  -MH      Sets 1 2  -MH      Time 1 30 sec hold  -MH              Exercise 2    Exercise Name 2 LS stretch  -MH      Cueing 2 Verbal; Demo  -MH      Sets 2 2  -MH      Time 2 30 sec hold  -MH              Exercise 3    Exercise Name 3 Pulley: flex, abd  -MH      Cueing 3 Verbal; Demo  -      Sets 3 1  -MH      Reps 3 20 each  -MH              Exercise 4    Exercise Name 4 Wall slides \"W\"  -MH      Cueing 4 Verbal; Demo  -      Sets 4 2  -MH      Reps 4 10  -MH              Exercise 5    Exercise Name 5 tband scap retraction  -MH      Cueing 5 Verbal  -MH      Sets 5 2  -MH      Reps 5 10  -MH      Additional Comments RTB  -              Exercise 6    Exercise Name 6 tband shoulder IR/ER  -MH      Cueing 6 Verbal; Demo  -MH      Sets 6 2  -MH      Reps 6 10  -MH      Additional Comments GTB  -              Exercise 7    Exercise Name 7 Tband shoulder abd and flex  -MH      Cueing 7 Verbal; Demo  -      Sets 7 2  -MH      Reps 7 10  -MH      Additional Comments GTB  -              Exercise 8    Exercise Name 8 tband shoulder add and ext  -MH      Cueing 8 Verbal; Demo  -      Sets 8 2  -MH      Reps 8 10  -MH      Additional Comments GTB  -            User Key  (r) = Recorded By, (t) = Taken By, (c) = Cosigned By    Initials Name Provider Type     Sara Loyola, PT Physical Therapist                              PT OP Goals     Row Name 11/08/21 1400          PT Short Term Goals    " STG Date to Achieve 11/19/21  -     STG 1 Pt will be independent with HEP for self-management of symptoms.  -     STG 1 Progress Ongoing  -            Time Calculation    PT Goal Re-Cert Due Date 11/19/21  -           User Key  (r) = Recorded By, (t) = Taken By, (c) = Cosigned By    Initials Name Provider Type     Sara Loyola, PT Physical Therapist                Therapy Education  Education Details: HEP: tband 6-way, tband scap retraction  Given: HEP  Program: New  How Provided: Verbal, Demonstration, Written  Provided to: Patient  Level of Understanding: Verbalized, Demonstrated              Time Calculation:   Start Time: 1428  Stop Time: 1513  Time Calculation (min): 45 min  Timed Charges  12485 - PT Therapeutic Exercise Minutes: 45  Total Minutes  Timed Charges Total Minutes: 45   Total Minutes: 45  Therapy Charges for Today     Code Description Service Date Service Provider Modifiers Qty    11451492813 HC PT THER PROC EA 15 MIN 11/8/2021 Sara Loyola, PT GP 3                    Sara Loyola, PT  11/8/2021

## 2021-11-15 ENCOUNTER — APPOINTMENT (OUTPATIENT)
Dept: PHYSICAL THERAPY | Facility: HOSPITAL | Age: 56
End: 2021-11-15

## 2021-11-16 DIAGNOSIS — K21.9 GASTROESOPHAGEAL REFLUX DISEASE WITHOUT ESOPHAGITIS: ICD-10-CM

## 2021-11-16 RX ORDER — OMEGA-3-ACID ETHYL ESTERS 1 G/1
CAPSULE, LIQUID FILLED ORAL
Qty: 30 CAPSULE | Refills: 1 | Status: SHIPPED | OUTPATIENT
Start: 2021-11-16 | End: 2022-02-08

## 2021-11-16 RX ORDER — OMEPRAZOLE 40 MG/1
CAPSULE, DELAYED RELEASE ORAL
Qty: 30 CAPSULE | Refills: 1 | Status: SHIPPED | OUTPATIENT
Start: 2021-11-16 | End: 2021-11-23

## 2021-11-23 ENCOUNTER — OFFICE VISIT (OUTPATIENT)
Dept: ORTHOPEDIC SURGERY | Facility: CLINIC | Age: 56
End: 2021-11-23

## 2021-11-23 VITALS — WEIGHT: 159.5 LBS | HEIGHT: 64 IN | BODY MASS INDEX: 27.23 KG/M2

## 2021-11-23 DIAGNOSIS — M25.511 CHRONIC PAIN IN RIGHT SHOULDER: Primary | ICD-10-CM

## 2021-11-23 DIAGNOSIS — G89.29 CHRONIC PAIN IN RIGHT SHOULDER: Primary | ICD-10-CM

## 2021-11-23 DIAGNOSIS — M75.101 ROTATOR CUFF SYNDROME, RIGHT: ICD-10-CM

## 2021-11-23 DIAGNOSIS — M24.811 INTERNAL DERANGEMENT OF RIGHT SHOULDER: ICD-10-CM

## 2021-11-23 PROCEDURE — 99214 OFFICE O/P EST MOD 30 MIN: CPT | Performed by: NURSE PRACTITIONER

## 2021-11-23 RX ORDER — MELOXICAM 15 MG/1
15 TABLET ORAL DAILY
Qty: 30 TABLET | Refills: 5 | Status: SHIPPED | OUTPATIENT
Start: 2021-11-23 | End: 2022-04-29 | Stop reason: SINTOL
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
## 2021-12-14 DIAGNOSIS — E55.9 VITAMIN D DEFICIENCY: ICD-10-CM

## 2021-12-14 RX ORDER — ERGOCALCIFEROL 1.25 MG/1
CAPSULE ORAL
Qty: 4 CAPSULE | Refills: 48 | Status: SHIPPED | OUTPATIENT
Start: 2021-12-14 | End: 2022-12-16

## 2022-02-08 DIAGNOSIS — K21.9 GASTROESOPHAGEAL REFLUX DISEASE WITHOUT ESOPHAGITIS: ICD-10-CM

## 2022-02-08 DIAGNOSIS — F31.9 BIPOLAR DEPRESSION: ICD-10-CM

## 2022-02-08 RX ORDER — OMEGA-3-ACID ETHYL ESTERS 1 G/1
CAPSULE, LIQUID FILLED ORAL
Qty: 30 CAPSULE | Refills: 2 | Status: SHIPPED | OUTPATIENT
Start: 2022-02-08 | End: 2022-05-03

## 2022-02-08 RX ORDER — QUETIAPINE FUMARATE 50 MG/1
50 TABLET, FILM COATED ORAL NIGHTLY
Qty: 30 TABLET | Refills: 3 | Status: SHIPPED | OUTPATIENT
Start: 2022-02-08 | End: 2022-06-01

## 2022-02-08 RX ORDER — OMEPRAZOLE 40 MG/1
CAPSULE, DELAYED RELEASE ORAL
Qty: 30 CAPSULE | Refills: 2 | Status: SHIPPED | OUTPATIENT
Start: 2022-02-08 | End: 2022-05-03

## 2022-04-29 ENCOUNTER — OFFICE VISIT (OUTPATIENT)
Dept: FAMILY MEDICINE CLINIC | Facility: CLINIC | Age: 57
End: 2022-04-29

## 2022-04-29 VITALS
SYSTOLIC BLOOD PRESSURE: 102 MMHG | BODY MASS INDEX: 28.61 KG/M2 | WEIGHT: 167.6 LBS | HEIGHT: 64 IN | DIASTOLIC BLOOD PRESSURE: 66 MMHG

## 2022-04-29 DIAGNOSIS — M54.2 CHRONIC CERVICAL PAIN: ICD-10-CM

## 2022-04-29 DIAGNOSIS — F31.9 BIPOLAR DEPRESSION: Primary | ICD-10-CM

## 2022-04-29 DIAGNOSIS — J30.2 SEASONAL ALLERGIES: ICD-10-CM

## 2022-04-29 DIAGNOSIS — G89.29 CHRONIC CERVICAL PAIN: ICD-10-CM

## 2022-04-29 DIAGNOSIS — K21.9 GASTROESOPHAGEAL REFLUX DISEASE WITHOUT ESOPHAGITIS: ICD-10-CM

## 2022-04-29 PROCEDURE — 99214 OFFICE O/P EST MOD 30 MIN: CPT | Performed by: NURSE PRACTITIONER

## 2022-04-29 RX ORDER — BROMPHENIRAMINE MALEATE, PSEUDOEPHEDRINE HYDROCHLORIDE, AND DEXTROMETHORPHAN HYDROBROMIDE 2; 30; 10 MG/5ML; MG/5ML; MG/5ML
5 SYRUP ORAL 4 TIMES DAILY PRN
Qty: 120 ML | Refills: 1 | Status: SHIPPED | OUTPATIENT
Start: 2022-04-29 | End: 2023-03-10

## 2022-04-29 RX ORDER — IBUPROFEN 800 MG/1
800 TABLET ORAL EVERY 8 HOURS PRN
Qty: 60 TABLET | Refills: 1 | Status: SHIPPED | OUTPATIENT
Start: 2022-04-29 | End: 2022-06-29

## 2022-04-29 RX ORDER — FEXOFENADINE HYDROCHLORIDE 180 MG/1
180 TABLET, FILM COATED ORAL DAILY
Qty: 90 TABLET | Refills: 1 | Status: SHIPPED | OUTPATIENT
Start: 2022-04-29 | End: 2022-11-18
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
## 2022-05-03 DIAGNOSIS — K21.9 GASTROESOPHAGEAL REFLUX DISEASE WITHOUT ESOPHAGITIS: ICD-10-CM

## 2022-05-03 RX ORDER — OMEGA-3-ACID ETHYL ESTERS 1 G/1
CAPSULE, LIQUID FILLED ORAL
Qty: 30 CAPSULE | Refills: 2 | Status: SHIPPED | OUTPATIENT
Start: 2022-05-03 | End: 2022-07-27

## 2022-05-03 RX ORDER — OMEPRAZOLE 40 MG/1
CAPSULE, DELAYED RELEASE ORAL
Qty: 30 CAPSULE | Refills: 2 | Status: SHIPPED | OUTPATIENT
Start: 2022-05-03 | End: 2022-07-27

## 2022-06-01 DIAGNOSIS — F31.9 BIPOLAR DEPRESSION: ICD-10-CM

## 2022-06-01 RX ORDER — QUETIAPINE FUMARATE 50 MG/1
50 TABLET, FILM COATED ORAL NIGHTLY
Qty: 30 TABLET | Refills: 3 | Status: SHIPPED | OUTPATIENT
Start: 2022-06-01 | End: 2022-09-21

## 2022-06-29 DIAGNOSIS — M54.2 CHRONIC CERVICAL PAIN: ICD-10-CM

## 2022-06-29 DIAGNOSIS — G89.29 CHRONIC CERVICAL PAIN: ICD-10-CM

## 2022-06-29 RX ORDER — IBUPROFEN 800 MG/1
800 TABLET ORAL EVERY 8 HOURS PRN
Qty: 60 TABLET | Refills: 1 | Status: SHIPPED | OUTPATIENT
Start: 2022-06-29 | End: 2022-08-25

## 2022-07-27 DIAGNOSIS — K21.9 GASTROESOPHAGEAL REFLUX DISEASE WITHOUT ESOPHAGITIS: ICD-10-CM

## 2022-07-27 RX ORDER — OMEPRAZOLE 40 MG/1
CAPSULE, DELAYED RELEASE ORAL
Qty: 30 CAPSULE | Refills: 2 | Status: SHIPPED | OUTPATIENT
Start: 2022-07-27 | End: 2022-11-18

## 2022-07-27 RX ORDER — OMEGA-3-ACID ETHYL ESTERS 1 G/1
CAPSULE, LIQUID FILLED ORAL
Qty: 30 CAPSULE | Refills: 2 | Status: SHIPPED | OUTPATIENT
Start: 2022-07-27 | End: 2022-11-18

## 2022-08-25 ENCOUNTER — OFFICE VISIT (OUTPATIENT)
Dept: FAMILY MEDICINE CLINIC | Facility: CLINIC | Age: 57
End: 2022-08-25

## 2022-08-25 VITALS
SYSTOLIC BLOOD PRESSURE: 126 MMHG | DIASTOLIC BLOOD PRESSURE: 72 MMHG | WEIGHT: 174.8 LBS | OXYGEN SATURATION: 100 % | HEIGHT: 64 IN | BODY MASS INDEX: 29.84 KG/M2 | HEART RATE: 54 BPM

## 2022-08-25 DIAGNOSIS — R22.32 ARM MASS, LEFT: Primary | ICD-10-CM

## 2022-08-25 DIAGNOSIS — M54.12 CERVICAL RADICULOPATHY: ICD-10-CM

## 2022-08-25 DIAGNOSIS — M54.2 CHRONIC CERVICAL PAIN: ICD-10-CM

## 2022-08-25 DIAGNOSIS — G89.29 CHRONIC CERVICAL PAIN: ICD-10-CM

## 2022-08-25 PROCEDURE — 99214 OFFICE O/P EST MOD 30 MIN: CPT | Performed by: NURSE PRACTITIONER

## 2022-08-25 RX ORDER — TRIAMCINOLONE ACETONIDE 40 MG/ML
80 INJECTION, SUSPENSION INTRA-ARTICULAR; INTRAMUSCULAR ONCE
Status: DISCONTINUED | OUTPATIENT
Start: 2022-08-25 | End: 2022-10-28

## 2022-08-25 RX ORDER — CELECOXIB 100 MG/1
100 CAPSULE ORAL 2 TIMES DAILY
Qty: 60 CAPSULE | Refills: 1 | Status: SHIPPED | OUTPATIENT
Start: 2022-08-25 | End: 2022-11-18

## 2022-08-25 NOTE — PROGRESS NOTES
"Chief Complaint  Cyst (Left Arm )    Subjective   Two months ago found a \"lump in my left arm.  I know that it is sore and painful when you touch it.  My neck is also been getting worse.  I am now having pain that goes into my arms and my hands are getting sore too.  I am having a hard time opening jars.\"        Natividad Montgomery presents to [facility] [place]  Cyst  This is a new problem. The current episode started more than 1 month ago. The problem occurs constantly. The problem has been unchanged. Associated symptoms include headaches and neck pain. Nothing aggravates the symptoms. She has tried nothing for the symptoms. The treatment provided no relief.   Neck Pain   This is a chronic problem. The current episode started more than 1 year ago. The problem occurs constantly. The problem has been gradually worsening. The pain is present in the midline. The quality of the pain is described as aching and shooting. The pain is severe. Nothing aggravates the symptoms. The pain is same all the time. Associated symptoms include headaches. She has tried heat, ice, muscle relaxants and NSAIDs for the symptoms. The treatment provided mild relief.     Current Outpatient Medications on File Prior to Visit   Medication Sig Dispense Refill   • Allergy Relief 180 MG tablet Take 1 tablet by mouth Daily. 90 tablet 1   • brompheniramine-pseudoephedrine-DM 30-2-10 MG/5ML syrup Take 5 mL by mouth 4 (Four) Times a Day As Needed for Cough. 120 mL 1   • Docusate Sodium (COLACE PO) Take  by mouth Daily.     • fluticasone (FLONASE) 50 MCG/ACT nasal spray      • Hydrocortisone, Perianal, (ANUSOL-HC) 2.5 % rectal cream Insert  into the rectum 2 (Two) Times a Day. 30 g 1   • omega-3 acid ethyl esters (LOVAZA) 1 g capsule TAKE 1 CAPSULE BY MOUTH DAILY WITH BREAKFAST. 30 capsule 2   • omeprazole (priLOSEC) 40 MG capsule TAKE ONE CAPSULE BY MOUTH EVERY DAY 30 capsule 2   • QUEtiapine (SEROquel) " "50 MG tablet TAKE 1 TABLET BY MOUTH EVERY NIGHT. 30 tablet 3   • vitamin D (ERGOCALCIFEROL) 1.25 MG (06654 UT) capsule capsule TAKE ONE CAPSULE BY MOUTH ONCE A WEEK 4 capsule 48   • [DISCONTINUED] ibuprofen (ADVIL,MOTRIN) 800 MG tablet TAKE 1 TABLET BY MOUTH EVERY 8 (EIGHT) HOURS AS NEEDED FOR MILD PAIN . 60 tablet 1     No current facility-administered medications on file prior to visit.     Allergies   Allergen Reactions   • Latex Itching   • Sulfa Antibiotics Other (See Comments)     \"Run a high fever\"       Objective   Vital Signs:  /72   Pulse 54   Ht 162.6 cm (64\")   Wt 79.3 kg (174 lb 12.8 oz)   SpO2 100%   BMI 30.00 kg/m²   Estimated body mass index is 30 kg/m² as calculated from the following:    Height as of this encounter: 162.6 cm (64\").    Weight as of this encounter: 79.3 kg (174 lb 12.8 oz).      Physical Exam  Vitals and nursing note reviewed.   Constitutional:       Appearance: She is well-developed.   HENT:      Head: Normocephalic.   Cardiovascular:      Rate and Rhythm: Normal rate and regular rhythm.      Heart sounds: Normal heart sounds.   Pulmonary:      Effort: Pulmonary effort is normal.      Breath sounds: Normal breath sounds.   Musculoskeletal:         General: Normal range of motion.      Cervical back: Normal range of motion and neck supple. Tenderness present. Pain with movement present.   Skin:     General: Skin is warm.      Capillary Refill: Capillary refill takes less than 2 seconds.   Neurological:      Mental Status: She is alert and oriented to person, place, and time.   Psychiatric:         Behavior: Behavior normal.        Result Review :                Assessment and Plan   Diagnoses and all orders for this visit:    1. Arm mass, left (Primary)  -     US Soft Tissue; Future    2. Chronic cervical pain  -     celecoxib (CeleBREX) 100 MG capsule; Take 1 capsule by mouth 2 (Two) Times a Day.  Dispense: 60 capsule; Refill: 1  -     MRI Cervical Spine Without Contrast; " Future  -     triamcinolone acetonide (KENALOG-40) injection 80 mg    3. Cervical radiculopathy  -     celecoxib (CeleBREX) 100 MG capsule; Take 1 capsule by mouth 2 (Two) Times a Day.  Dispense: 60 capsule; Refill: 1  -     MRI Cervical Spine Without Contrast; Future    1.  Arm mass, left:  Radiology will call to schedule ultrasound of the soft tissue and will notify of results when available    2.  Chronic cervical pain:  Discontinue ibuprofen  Begin Celebrex as prescribed  Educated on possible side effects of this medication including but not limited to increased risk for gastrointestinal bleeding  Encouraged not to take any other NSAIDs including but not limited to Advil, ibuprofen, Motrin, Naprosyn or Aleve while on this medication if may increased risk for gastrointestinal bleeding  Encouraged to take this medication with food in order to reduce GI discomfort  Educated to watch for signs of possible GI bleeding such as dark, black or tarry looking stools  Radiology will call to schedule MRI of the cervical spine will notify of results when available  Kenalog 80 mg given IM in office  Educated on possible side effects of long-term use of steroidal medications including not limited to increased risk for glaucoma and osteoporosis    3.  Cervical radiculopathy:  Celebrex as discussed above  MRI as discussed above       Follow Up   Return if symptoms worsen or fail to improve, for Next scheduled follow up.  Patient was given instructions and counseling regarding her condition or for health maintenance advice. Please see specific information pulled into the AVS if appropriate.         This document has been electronically signed by DHARMESH Garza on August 25, 2022 09:23 CDT

## 2022-08-26 DIAGNOSIS — G89.29 CHRONIC CERVICAL PAIN: Primary | ICD-10-CM

## 2022-08-26 DIAGNOSIS — M54.2 CHRONIC CERVICAL PAIN: Primary | ICD-10-CM

## 2022-08-26 DIAGNOSIS — M54.12 CERVICAL RADICULOPATHY: ICD-10-CM

## 2022-09-01 ENCOUNTER — TELEPHONE (OUTPATIENT)
Dept: FAMILY MEDICINE CLINIC | Facility: CLINIC | Age: 57
End: 2022-09-01

## 2022-09-01 NOTE — TELEPHONE ENCOUNTER
Per DHARMESH Gonzalez, Ms. Montgomery has been called with recent C-Spine x-ray  results & recommendations.  Continue current medications and follow-up as planned or sooner if any problems.     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  She also has involvement of osteophytes (bone spurs) that suggest some right-sided stenosis at the C4-C5 and C5-C6.  Would recommend MRI.  If she would like me to proceed with the order please let me know.  Please make her aware that her insurance may require physical therapy prior to approving MRI

## 2022-09-02 DIAGNOSIS — R93.7 ABNORMAL X-RAY OF CERVICAL SPINE: ICD-10-CM

## 2022-09-02 DIAGNOSIS — M54.2 CHRONIC CERVICAL PAIN: Primary | ICD-10-CM

## 2022-09-02 DIAGNOSIS — G89.29 CHRONIC CERVICAL PAIN: Primary | ICD-10-CM

## 2022-09-02 DIAGNOSIS — M54.12 CERVICAL RADICULOPATHY: ICD-10-CM

## 2022-09-08 ENCOUNTER — HOSPITAL ENCOUNTER (OUTPATIENT)
Dept: ULTRASOUND IMAGING | Facility: HOSPITAL | Age: 57
Discharge: HOME | End: 2022-09-08
Admitting: NURSE PRACTITIONER

## 2022-09-08 DIAGNOSIS — R22.32 ARM MASS, LEFT: ICD-10-CM

## 2022-09-08 PROCEDURE — 76882 US LMTD JT/FCL EVL NVASC XTR: CPT

## 2022-09-09 ENCOUNTER — TELEPHONE (OUTPATIENT)
Dept: FAMILY MEDICINE CLINIC | Facility: CLINIC | Age: 57
End: 2022-09-09

## 2022-09-09 DIAGNOSIS — R22.32 ARM MASS, LEFT: Primary | ICD-10-CM

## 2022-09-21 DIAGNOSIS — F31.9 BIPOLAR DEPRESSION: ICD-10-CM

## 2022-09-21 RX ORDER — QUETIAPINE FUMARATE 50 MG/1
50 TABLET, FILM COATED ORAL NIGHTLY
Qty: 30 TABLET | Refills: 3 | Status: SHIPPED | OUTPATIENT
Start: 2022-09-21 | End: 2023-02-10

## 2022-10-03 ENCOUNTER — OFFICE VISIT (OUTPATIENT)
Dept: SURGERY | Facility: CLINIC | Age: 57
End: 2022-10-03

## 2022-10-03 VITALS
WEIGHT: 175.6 LBS | HEIGHT: 64 IN | OXYGEN SATURATION: 99 % | DIASTOLIC BLOOD PRESSURE: 70 MMHG | SYSTOLIC BLOOD PRESSURE: 112 MMHG | TEMPERATURE: 97.1 F | HEART RATE: 79 BPM | BODY MASS INDEX: 29.98 KG/M2

## 2022-10-03 DIAGNOSIS — R22.32 MASS OF ARM, LEFT: Primary | ICD-10-CM

## 2022-10-03 PROCEDURE — 99212 OFFICE O/P EST SF 10 MIN: CPT | Performed by: NURSE PRACTITIONER

## 2022-10-03 RX ORDER — IBUPROFEN 800 MG/1
800 TABLET ORAL EVERY 6 HOURS PRN
COMMUNITY
End: 2023-03-27

## 2022-10-03 NOTE — PROGRESS NOTES
"Chief Complaint  new patient (Left arm mass (ref. By ALCIDES Henry))    Subjective        Natividad Montgomery presents to [facility]  History of Present Illness  Ms. Natividad Casanova is a 57-year-old patient who presents today for concerns of mass on left arm.  She states she first noticed this appear after her COVID vaccines approximately 3 months ago.  She states the mass was tender to touch however seems to been getting smaller and less tender.  She states that she went to have ultrasound area but they were unable to identify the mass.    Study Result  Narrative & Impression   EXAM:   US EXTREMITY MUSCULOSKELETAL LIMITED   ORDERING PROVIDER:   JV HENRY   CLINICAL HISTORY:   Left upper arm mass, painful   COMPARISON STUDY:   TECHNIQUE:   Sonogram of the region of concern in the left upper arm with   grayscale, duplex and color Doppler assessment.   FINDINGS:   There is no evidence of focal abnormality in the region of   concern. There is no evidence of cystic or solid lesion observed.   Unremarkable contour of the musculature and soft tissue is noted.   No focal increased vascularity.   IMPRESSION:   No sonographic abnormalities observed in the region of concern.   There is no cyst or solid lesion, or abnormal vascularity.   Correlation with patient's symptoms and history is recommended.   Electronically signed by: Bradley Diallo MD 9/9/2022 7:25 AM CDT   Workstation: [id number]       Objective   Vital Signs:  /70 (BP Location: Right arm, Patient Position: Sitting, Cuff Size: Large Adult)   Pulse 79   Temp 97.1 °F (36.2 °C) (Infrared)   Ht 162.6 cm (64\")   Wt 79.7 kg (175 lb 9.6 oz)   SpO2 99%   BMI 30.14 kg/m²   Estimated body mass index is 30.14 kg/m² as calculated from the following:    Height as of this encounter: 162.6 cm (64\").    Weight as of this encounter: 79.7 kg (175 lb 9.6 oz).          Physical Exam  Vitals reviewed.   Constitutional:       General: She is " not in acute distress.     Appearance: Normal appearance. She is obese. She is not ill-appearing, toxic-appearing or diaphoretic.   Cardiovascular:      Rate and Rhythm: Normal rate.   Pulmonary:      Effort: Pulmonary effort is normal. No respiratory distress.   Skin:     General: Skin is warm and dry.          Neurological:      Mental Status: She is alert.   Psychiatric:         Mood and Affect: Mood normal.         Behavior: Behavior normal.         Thought Content: Thought content normal.         Judgment: Judgment normal.        Result Review :      Data reviewed: Radiologic studies ultrasound          Assessment and Plan   Diagnoses and all orders for this visit:    1. Mass of arm, left (Primary)           I spent 18 minutes caring for Natividad on this date of service. This time includes time spent by me in the following activities:preparing for the visit, reviewing tests, obtaining and/or reviewing a separately obtained history, performing a medically appropriate examination and/or evaluation , documenting information in the medical record and care coordination  Follow Up   Return in about 2 months (around 12/3/2022), or if symptoms worsen or fail to improve.     Patient is to recheck in 8 weeks or sooner if concerns arise.  May consider reimaging at that time.  Patient is agreeable to plan and is aware of signs and symptoms that would necessitate further medical evaluation.    Patient was given instructions and counseling regarding her condition or for health maintenance advice. Please see specific information pulled into the AVS if appropriate.               This document has been electronically signed by DHARMESH Olsen on October 3, 2022 13:55 CDT

## 2022-10-10 ENCOUNTER — PATIENT ROUNDING (BHMG ONLY) (OUTPATIENT)
Dept: SURGERY | Facility: CLINIC | Age: 57
End: 2022-10-10

## 2022-10-10 NOTE — PROGRESS NOTES
"October 10, 2022    Hello, may I speak with Natividad Crystal Hayleyazalia?\"This is her.\"    My name is Natividad Wang    I am a Medical Assistant with [facility]    Before we get started may I verify your date of birth? 1965 Date Of Birth Verified.    I am calling to officially welcome you to our practice and ask about your recent visit. Is this a good time to talk? Yes.    Tell me about your visit with us. What things went well? \"My visit went well.\"     We're always looking for ways to make our patients' experiences even better. Do you have recommendations on ways we may improve? \"No, everything was good.\"    Overall were you satisfied with your first visit to our practice? Yes.    I appreciate you taking the time to speak with me today. Is there anything else I can do for you? No.    Thank you, and have a great day.    Patient is scheduled for follow-up office visit. Patient instructed to call the office with any questions or concerns.  "

## 2022-10-28 ENCOUNTER — OFFICE VISIT (OUTPATIENT)
Dept: FAMILY MEDICINE CLINIC | Facility: CLINIC | Age: 57
End: 2022-10-28

## 2022-10-28 ENCOUNTER — LAB (OUTPATIENT)
Dept: LAB | Facility: HOSPITAL | Age: 57
End: 2022-10-28

## 2022-10-28 VITALS
WEIGHT: 175.2 LBS | DIASTOLIC BLOOD PRESSURE: 74 MMHG | BODY MASS INDEX: 29.91 KG/M2 | HEART RATE: 64 BPM | SYSTOLIC BLOOD PRESSURE: 128 MMHG | HEIGHT: 64 IN | OXYGEN SATURATION: 99 %

## 2022-10-28 DIAGNOSIS — Z13.220 SCREENING FOR HYPERCHOLESTEROLEMIA: ICD-10-CM

## 2022-10-28 DIAGNOSIS — G89.29 CHRONIC CERVICAL PAIN: ICD-10-CM

## 2022-10-28 DIAGNOSIS — K21.9 GASTROESOPHAGEAL REFLUX DISEASE WITHOUT ESOPHAGITIS: ICD-10-CM

## 2022-10-28 DIAGNOSIS — M54.2 CHRONIC CERVICAL PAIN: ICD-10-CM

## 2022-10-28 DIAGNOSIS — F33.1 MODERATE EPISODE OF RECURRENT MAJOR DEPRESSIVE DISORDER: ICD-10-CM

## 2022-10-28 DIAGNOSIS — Z12.31 ENCOUNTER FOR SCREENING MAMMOGRAM FOR MALIGNANT NEOPLASM OF BREAST: ICD-10-CM

## 2022-10-28 DIAGNOSIS — Z00.00 ANNUAL PHYSICAL EXAM: Primary | ICD-10-CM

## 2022-10-28 DIAGNOSIS — R00.2 PALPITATIONS: ICD-10-CM

## 2022-10-28 DIAGNOSIS — Z23 NEED FOR PNEUMOCOCCAL VACCINATION: ICD-10-CM

## 2022-10-28 DIAGNOSIS — Z23 NEED FOR IMMUNIZATION AGAINST INFLUENZA: ICD-10-CM

## 2022-10-28 DIAGNOSIS — Z13.29 SCREENING FOR HYPOTHYROIDISM: ICD-10-CM

## 2022-10-28 PROCEDURE — 93010 ELECTROCARDIOGRAM REPORT: CPT | Performed by: INTERNAL MEDICINE

## 2022-10-28 PROCEDURE — 90472 IMMUNIZATION ADMIN EACH ADD: CPT | Performed by: NURSE PRACTITIONER

## 2022-10-28 PROCEDURE — 99396 PREV VISIT EST AGE 40-64: CPT | Performed by: NURSE PRACTITIONER

## 2022-10-28 PROCEDURE — 90471 IMMUNIZATION ADMIN: CPT | Performed by: NURSE PRACTITIONER

## 2022-10-28 PROCEDURE — 80061 LIPID PANEL: CPT

## 2022-10-28 PROCEDURE — 90686 IIV4 VACC NO PRSV 0.5 ML IM: CPT | Performed by: NURSE PRACTITIONER

## 2022-10-28 PROCEDURE — 80050 GENERAL HEALTH PANEL: CPT

## 2022-10-28 PROCEDURE — 36415 COLL VENOUS BLD VENIPUNCTURE: CPT

## 2022-10-28 PROCEDURE — 93005 ELECTROCARDIOGRAM TRACING: CPT | Performed by: NURSE PRACTITIONER

## 2022-10-28 PROCEDURE — 90677 PCV20 VACCINE IM: CPT | Performed by: NURSE PRACTITIONER

## 2022-10-29 LAB
ALBUMIN SERPL-MCNC: 4.1 G/DL (ref 3.5–5.2)
ALBUMIN/GLOB SERPL: 1.5 G/DL
ALP SERPL-CCNC: 76 U/L (ref 39–117)
ALT SERPL W P-5'-P-CCNC: 10 U/L (ref 1–33)
ANION GAP SERPL CALCULATED.3IONS-SCNC: 10.7 MMOL/L (ref 5–15)
AST SERPL-CCNC: 18 U/L (ref 1–32)
BASOPHILS # BLD AUTO: 0.02 10*3/MM3 (ref 0–0.2)
BASOPHILS NFR BLD AUTO: 0.4 % (ref 0–1.5)
BILIRUB SERPL-MCNC: 0.2 MG/DL (ref 0–1.2)
BUN SERPL-MCNC: 15 MG/DL (ref 6–20)
BUN/CREAT SERPL: 20.5 (ref 7–25)
CALCIUM SPEC-SCNC: 9.1 MG/DL (ref 8.6–10.5)
CHLORIDE SERPL-SCNC: 105 MMOL/L (ref 98–107)
CHOLEST SERPL-MCNC: 199 MG/DL (ref 0–200)
CO2 SERPL-SCNC: 24.3 MMOL/L (ref 22–29)
CREAT SERPL-MCNC: 0.73 MG/DL (ref 0.57–1)
DEPRECATED RDW RBC AUTO: 41.1 FL (ref 37–54)
EGFRCR SERPLBLD CKD-EPI 2021: 95.5 ML/MIN/1.73
EOSINOPHIL # BLD AUTO: 0.1 10*3/MM3 (ref 0–0.4)
EOSINOPHIL NFR BLD AUTO: 1.9 % (ref 0.3–6.2)
ERYTHROCYTE [DISTWIDTH] IN BLOOD BY AUTOMATED COUNT: 12.6 % (ref 12.3–15.4)
GLOBULIN UR ELPH-MCNC: 2.7 GM/DL
GLUCOSE SERPL-MCNC: 92 MG/DL (ref 65–99)
HCT VFR BLD AUTO: 40.6 % (ref 34–46.6)
HDLC SERPL-MCNC: 57 MG/DL (ref 40–60)
HGB BLD-MCNC: 13.9 G/DL (ref 12–15.9)
IMM GRANULOCYTES # BLD AUTO: 0.01 10*3/MM3 (ref 0–0.05)
IMM GRANULOCYTES NFR BLD AUTO: 0.2 % (ref 0–0.5)
LDLC SERPL CALC-MCNC: 126 MG/DL (ref 0–100)
LDLC/HDLC SERPL: 2.17 {RATIO}
LYMPHOCYTES # BLD AUTO: 2.04 10*3/MM3 (ref 0.7–3.1)
LYMPHOCYTES NFR BLD AUTO: 38.7 % (ref 19.6–45.3)
MCH RBC QN AUTO: 31 PG (ref 26.6–33)
MCHC RBC AUTO-ENTMCNC: 34.2 G/DL (ref 31.5–35.7)
MCV RBC AUTO: 90.4 FL (ref 79–97)
MONOCYTES # BLD AUTO: 0.19 10*3/MM3 (ref 0.1–0.9)
MONOCYTES NFR BLD AUTO: 3.6 % (ref 5–12)
NEUTROPHILS NFR BLD AUTO: 2.91 10*3/MM3 (ref 1.7–7)
NEUTROPHILS NFR BLD AUTO: 55.2 % (ref 42.7–76)
NRBC BLD AUTO-RTO: 0 /100 WBC (ref 0–0.2)
PLATELET # BLD AUTO: 288 10*3/MM3 (ref 140–450)
PMV BLD AUTO: 10.2 FL (ref 6–12)
POTASSIUM SERPL-SCNC: 4 MMOL/L (ref 3.5–5.2)
PROT SERPL-MCNC: 6.8 G/DL (ref 6–8.5)
QT INTERVAL: 426 MS
QTC INTERVAL: 407 MS
RBC # BLD AUTO: 4.49 10*6/MM3 (ref 3.77–5.28)
SODIUM SERPL-SCNC: 140 MMOL/L (ref 136–145)
TRIGL SERPL-MCNC: 91 MG/DL (ref 0–150)
TSH SERPL DL<=0.05 MIU/L-ACNC: 2.11 UIU/ML (ref 0.27–4.2)
VLDLC SERPL-MCNC: 16 MG/DL (ref 5–40)
WBC NRBC COR # BLD: 5.27 10*3/MM3 (ref 3.4–10.8)

## 2022-10-31 ENCOUNTER — TELEPHONE (OUTPATIENT)
Dept: FAMILY MEDICINE CLINIC | Facility: CLINIC | Age: 57
End: 2022-10-31

## 2022-10-31 NOTE — TELEPHONE ENCOUNTER
Per DHARMESH Gonzalez, Ms. Montgomery has been called with recent lab results & recommendations.  Continue current medications and follow-up as planned or sooner if any problems.   ----- Message from DHARMESH Garza sent at 10/29/2022  9:32 AM CDT -----  Labs normal, please call or send card!

## 2022-11-18 DIAGNOSIS — K64.9 HEMORRHOIDS, UNSPECIFIED HEMORRHOID TYPE: ICD-10-CM

## 2022-11-18 DIAGNOSIS — J30.2 SEASONAL ALLERGIES: ICD-10-CM

## 2022-11-18 DIAGNOSIS — G89.29 CHRONIC CERVICAL PAIN: ICD-10-CM

## 2022-11-18 DIAGNOSIS — M54.12 CERVICAL RADICULOPATHY: ICD-10-CM

## 2022-11-18 DIAGNOSIS — K21.9 GASTROESOPHAGEAL REFLUX DISEASE WITHOUT ESOPHAGITIS: ICD-10-CM

## 2022-11-18 DIAGNOSIS — M54.2 CHRONIC CERVICAL PAIN: ICD-10-CM

## 2022-11-18 RX ORDER — CELECOXIB 100 MG/1
100 CAPSULE ORAL 2 TIMES DAILY
Qty: 60 CAPSULE | Refills: 1 | Status: SHIPPED | OUTPATIENT
Start: 2022-11-18 | End: 2023-01-13

## 2022-11-18 RX ORDER — OMEGA-3-ACID ETHYL ESTERS 1 G/1
CAPSULE, LIQUID FILLED ORAL
Qty: 30 CAPSULE | Refills: 2 | Status: SHIPPED | OUTPATIENT
Start: 2022-11-18 | End: 2023-02-10

## 2022-11-18 RX ORDER — OMEPRAZOLE 40 MG/1
CAPSULE, DELAYED RELEASE ORAL
Qty: 30 CAPSULE | Refills: 2 | Status: SHIPPED | OUTPATIENT
Start: 2022-11-18 | End: 2023-02-10

## 2022-11-18 RX ORDER — HYDROCORTISONE 25 MG/G
CREAM TOPICAL 2 TIMES DAILY
Qty: 30 G | Refills: 1 | Status: CANCELLED | OUTPATIENT
Start: 2022-11-18

## 2022-11-18 RX ORDER — FEXOFENADINE HYDROCHLORIDE 180 MG/1
TABLET ORAL
Qty: 30 TABLET | Refills: 1 | Status: SHIPPED | OUTPATIENT
Start: 2022-11-18 | End: 2023-01-13

## 2022-11-18 RX ORDER — HYDROCORTISONE 25 MG/G
CREAM TOPICAL
Qty: 28 G | Refills: 1 | Status: SHIPPED | OUTPATIENT
Start: 2022-11-18 | End: 2023-03-29

## 2022-11-28 ENCOUNTER — OFFICE VISIT (OUTPATIENT)
Dept: SURGERY | Facility: CLINIC | Age: 57
End: 2022-11-28

## 2022-11-28 VITALS
HEART RATE: 78 BPM | OXYGEN SATURATION: 99 % | DIASTOLIC BLOOD PRESSURE: 77 MMHG | WEIGHT: 176.4 LBS | SYSTOLIC BLOOD PRESSURE: 112 MMHG | TEMPERATURE: 98.1 F | HEIGHT: 64 IN | BODY MASS INDEX: 30.11 KG/M2

## 2022-11-28 DIAGNOSIS — R22.32 MASS OF ARM, LEFT: Primary | ICD-10-CM

## 2022-11-28 PROCEDURE — 99212 OFFICE O/P EST SF 10 MIN: CPT | Performed by: NURSE PRACTITIONER

## 2022-11-28 NOTE — PROGRESS NOTES
"Chief Complaint  Follow-up (Recheck Left Arm)    Subjective        Natividad Montgomery presents to [facility] [place]     History of Present Illness  Ms. Natividad Casanova is a 57-year-old patient who presents today for recheck of left arm mass.  She states she first noticed this appear after her COVID vaccines approximately 3 months ago.  She reports the area of swelling has now subsided and is no longer tender. She had ultrasound area but they were unable to identify the mass at the time.        Objective   Vital Signs:  /77   Pulse 78   Temp 98.1 °F (36.7 °C) (Oral)   Ht 162.6 cm (64\")   Wt 80 kg (176 lb 6.4 oz)   SpO2 99%   BMI 30.28 kg/m²   Estimated body mass index is 30.28 kg/m² as calculated from the following:    Height as of this encounter: 162.6 cm (64\").    Weight as of this encounter: 80 kg (176 lb 6.4 oz).          Physical Exam  Vitals reviewed.   Constitutional:       General: She is not in acute distress.     Appearance: Normal appearance. She is obese. She is not ill-appearing, toxic-appearing or diaphoretic.   Cardiovascular:      Rate and Rhythm: Normal rate.   Pulmonary:      Effort: Pulmonary effort is normal. No respiratory distress.   Skin:     General: Skin is warm and dry.          Neurological:      Mental Status: She is alert.   Psychiatric:         Mood and Affect: Mood normal.         Behavior: Behavior normal.         Thought Content: Thought content normal.         Judgment: Judgment normal.        Result Review :                Assessment and Plan   Diagnoses and all orders for this visit:    1. Mass of arm, left (Primary)           I spent 14 minutes caring for Natividad on this date of service. This time includes time spent by me in the following activities:preparing for the visit, obtaining and/or reviewing a separately obtained history, performing a medically appropriate examination and/or evaluation , documenting information " in the medical record and care coordination  Follow Up   Return if symptoms worsen or fail to improve.     Recheck as needed.       Patient was given instructions and counseling regarding her condition or for health maintenance advice. Please see specific information pulled into the AVS if appropriate.                   This document has been electronically signed by DHARMESH Olsen on November 28, 2022 13:37 CST

## 2022-12-08 ENCOUNTER — OFFICE VISIT (OUTPATIENT)
Dept: CARDIOLOGY | Facility: CLINIC | Age: 57
End: 2022-12-08

## 2022-12-08 VITALS
HEIGHT: 64 IN | DIASTOLIC BLOOD PRESSURE: 80 MMHG | BODY MASS INDEX: 30.29 KG/M2 | SYSTOLIC BLOOD PRESSURE: 132 MMHG | WEIGHT: 177.4 LBS | OXYGEN SATURATION: 96 % | TEMPERATURE: 97.9 F | HEART RATE: 104 BPM

## 2022-12-08 DIAGNOSIS — R06.09 DYSPNEA ON EXERTION: ICD-10-CM

## 2022-12-08 DIAGNOSIS — R00.2 PALPITATIONS: Primary | ICD-10-CM

## 2022-12-08 PROCEDURE — 93000 ELECTROCARDIOGRAM COMPLETE: CPT | Performed by: INTERNAL MEDICINE

## 2022-12-08 PROCEDURE — 99214 OFFICE O/P EST MOD 30 MIN: CPT | Performed by: NURSE PRACTITIONER

## 2022-12-08 NOTE — PROGRESS NOTES
"Palpitations (Chief complaint)      Heart Problem  The current episode started 1 to 4 weeks ago (low heart rate). The problem occurs intermittently. The problem has been gradually worsening. Associated symptoms include chest pain. Pertinent negatives include no abdominal pain, coughing, diaphoresis, fever, headaches, nausea or weakness. Nothing aggravates the symptoms. She has tried nothing for the symptoms. The treatment provided no relief.   Palpitations   This is a recurrent problem. The current episode started more than 1 month ago. The problem occurs intermittently. The problem has been gradually worsening. Nothing aggravates the symptoms. Associated symptoms include chest pain and an irregular heartbeat. Pertinent negatives include no anxiety, coughing, diaphoresis, dizziness, fever, malaise/fatigue, nausea, shortness of breath or weakness. She has tried nothing for the symptoms. The treatment provided no relief. Risk factors include post menopause, obesity, sedentary lifestyle, smoking/tobacco exposure and dyslipidemia.     Mrs. Natividad Montgomery is a 57 year old female with medical history of depression, GERD, and osteoarthritis. She has been referred to cardiology for palpitations and sinus bradycardia. EKG in PCP office also showed T wave inversion.     Today, she presents for initial evaluation. She reports intermittent palpitations, chest pain, and AGOSTO. She is a current smoker and not interested in quitting at this time.  She reports that anxiety makes CP and \"fluttering\" worse. She has tried deep breathing which has helped some but not all the time.  CP is a pressure, heaviness that is mid sternal and does not radiate.     The 10-year ASCVD risk score (Tania CARR, et al., 2019) is: 6.1%    Values used to calculate the score:      Age: 57 years      Sex: Female      Is Non- : No      Diabetic: No      Tobacco smoker: Yes      Systolic Blood Pressure: 132 mmHg      Is BP treated: No     "  HDL Cholesterol: 57 mg/dL      Total Cholesterol: 199 mg/dL    Cardiac Risk Factors:  hypercholesterolemia, smoking, Sedentary life style and Obesity    Past Medical History:   Diagnosis Date   • Allergic    • Anxiety    • Arthritis    • Breast cyst    • Bronchitis    • Colon polyp    • Depression    • DVT (deep venous thrombosis) ([facility])    • Fibrocystic breast    • GERD (gastroesophageal reflux disease)    • Hard to intubate    • Headache    • Strep throat    • Urinary tract infection      Past Surgical History:   Procedure Laterality Date   •  SECTION     • COLONOSCOPY     • EXCISION MASS LEG Right 2018    Procedure: EXCISION MASS RIGHT KNEE                             (LATEX ALLERGY);  Surgeon: Jose David Desai MD;  Location: [facility];  Service: Orthopedics   • SHOULDER SURGERY      right shoulder   • SUBTOTAL HYSTERECTOMY     • TONSILLECTOMY     • VAGINAL DILATATION      age 15     Social History     Socioeconomic History   • Marital status:    Tobacco Use   • Smoking status: Every Day     Packs/day: 0.25     Types: Cigarettes   • Smokeless tobacco: Never   Vaping Use   • Vaping Use: Never used   Substance and Sexual Activity   • Alcohol use: Yes     Comment: socially   • Drug use: No   • Sexual activity: Defer     Family History   Problem Relation Age of Onset   • Heart disease Mother    • Hypertension Mother    • Lung cancer Father    • Hypertension Father    • Thyroid disease Sister    • Hyperlipidemia Sister    • Hypertension Sister    • Thyroid disease Brother    • Hypertension Brother    • Hyperlipidemia Brother    • No Known Problems Daughter    • Heart failure Maternal Grandmother    • Heart attack Maternal Grandfather    • Heart disease Paternal Grandmother    • No Known Problems Paternal Grandfather    • Hypertension Sister    • Hypertension Sister    • Deep vein thrombosis Other    • Diabetes Other    • Cancer Other    • Colon cancer Paternal Aunt   "      ALLERGIES:  Allergies   Allergen Reactions   • Latex Itching   • Sulfa Antibiotics Other (See Comments)     \"Run a high fever\"       Review of Systems   Constitutional: Negative for diaphoresis, fever, malaise/fatigue and night sweats.   HENT: Negative.    Cardiovascular: Positive for chest pain, dyspnea on exertion, irregular heartbeat and palpitations. Negative for leg swelling and orthopnea.   Respiratory: Negative for cough, shortness of breath and wheezing.    Endocrine: Negative for polydipsia, polyphagia and polyuria.   Hematologic/Lymphatic: Negative for adenopathy and bleeding problem.   Musculoskeletal: Negative for falls, muscle cramps and muscle weakness.   Gastrointestinal: Negative for bloating, abdominal pain, melena and nausea.   Genitourinary: Negative.    Neurological: Negative for dizziness, headaches, light-headedness and weakness.   Psychiatric/Behavioral: Negative for hallucinations. The patient is not nervous/anxious.        Current Outpatient Medications   Medication Sig Dispense Refill   • 24HR Allergy Relief 180 MG tablet TAKE 1 TABLET BY MOUTH DAILY. 30 tablet 1   • brompheniramine-pseudoephedrine-DM 30-2-10 MG/5ML syrup Take 5 mL by mouth 4 (Four) Times a Day As Needed for Cough. 120 mL 1   • celecoxib (CeleBREX) 100 MG capsule TAKE 1 CAPSULE BY MOUTH 2 (TWO) TIMES A DAY. 60 capsule 1   • Docusate Sodium (COLACE PO) Take  by mouth Daily.     • fluticasone (FLONASE) 50 MCG/ACT nasal spray      • ibuprofen (ADVIL,MOTRIN) 800 MG tablet Take 800 mg by mouth Every 6 (Six) Hours As Needed for Mild Pain.     • omega-3 acid ethyl esters (LOVAZA) 1 g capsule TAKE 1 CAPSULE BY MOUTH DAILY WITH BREAKFAST. 30 capsule 2   • omeprazole (priLOSEC) 40 MG capsule TAKE ONE CAPSULE BY MOUTH EVERY DAY 30 capsule 2   • Procto-Med HC 2.5 % rectal cream INSERT INTO THE RECTUM 2 (TWO) TIMES A DAY. 28 g 1   • QUEtiapine (SEROquel) 50 MG tablet TAKE 1 TABLET BY MOUTH EVERY NIGHT. 30 tablet 3   • vitamin D " "(ERGOCALCIFEROL) 1.25 MG (16566 UT) capsule capsule TAKE ONE CAPSULE BY MOUTH ONCE A WEEK 4 capsule 48     No current facility-administered medications for this visit.       OBJECTIVE:    Physical Exam:   Vitals reviewed.   Constitutional:       Appearance: Normal and healthy appearance. Well-developed, well-groomed and not in distress. Not toxic-appearing or diaphoretic.   Eyes:      General: Lids are normal.         Right eye: No discharge.         Left eye: No discharge.      Conjunctiva/sclera: Conjunctivae normal.   HENT:      Head: Normocephalic and atraumatic.   Neck:      Thyroid: No thyromegaly.      Vascular: No JVD. JVD normal.      Trachea: Trachea normal.   Pulmonary:      Effort: Pulmonary effort is normal.      Breath sounds: Normal breath sounds and air entry. No decreased air movement.   Cardiovascular:      PMI at left midclavicular line. Normal rate. Regular rhythm. Normal S1 with normal intensity. Normal S2 with normal intensity.      Murmurs: There is no murmur.      No gallop.   Edema:     Peripheral edema absent.   Skin:     General: Skin is warm and dry.      Capillary Refill: Capillary refill takes less than 2 seconds.      Coloration: Skin is not pale.   Neurological:      Mental Status: Alert, oriented to person, place, and time and oriented to person, place and time.      Gait: Gait is intact.   Psychiatric:         Attention and Perception: Attention normal.         Mood and Affect: Mood normal.         Speech: Speech normal.         Behavior: Behavior normal. Behavior is cooperative.       Vitals:    12/08/22 1453   BP: 132/80   BP Location: Left arm   Patient Position: Sitting   Cuff Size: Adult   Pulse: 104   Temp: 97.9 °F (36.6 °C)   SpO2: 96%   Weight: 80.5 kg (177 lb 6.4 oz)   Height: 162.6 cm (64\")       DATA REVIEWED:       Mammo Screening Digital Tomosynthesis Bilateral With CAD    Result Date: 12/9/2022  No mammographic evidence of malignancy. BI-RADS category 2: Benign " findings. Recommendation: Annual mammography Electronically signed by:  Loki Kraus MD  12/9/2022 10:37 AM CST Workstation: [id number]     CXR:     CT:     Labs: BMP, CBC, LIPID, TSH  Lab Results   Component Value Date    GLUCOSE 92 10/28/2022    CALCIUM 9.1 10/28/2022     10/28/2022    K 4.0 10/28/2022    CO2 24.3 10/28/2022     10/28/2022    BUN 15 10/28/2022    CREATININE 0.73 10/28/2022    EGFRIFNONA 86 07/19/2021    BCR 20.5 10/28/2022    ANIONGAP 10.7 10/28/2022     Lab Results   Component Value Date    WBC 5.27 10/28/2022    HGB 13.9 10/28/2022    HCT 40.6 10/28/2022    MCV 90.4 10/28/2022     10/28/2022     Lab Results   Component Value Date    CHOL 199 10/28/2022    CHOL 196 07/19/2021    CHOL 210 (H) 10/17/2019     Lab Results   Component Value Date    TRIG 91 10/28/2022    TRIG 112 07/19/2021    TRIG 73 10/17/2019     Lab Results   Component Value Date    HDL 57 10/28/2022    HDL 51 07/19/2021    HDL 64 (H) 10/17/2019     No components found for: LDLCALC  Lab Results   Component Value Date     (H) 10/28/2022     (H) 07/19/2021     (H) 10/17/2019     No results found for: HDLLDLRATIO  No components found for: CHOLHDL  Lab Results   Component Value Date    TSH 2.110 10/28/2022     No results found for: PROBNP  EKG: Today       10/28/22      The following portions of the patient's history were reviewed and updated as appropriate: allergies, current medications, past family history, past medical history, past social history, past surgical history and problem list.  Old records reviewed and pertinent information is included in the above objective data.     ASSESSMENT/PLAN:     Diagnosis Plan   1. Palpitations  ECG 12 Lead    Mobile Cardiac Outpatient Telemetry      2. Dyspnea on exertion  CT Angiogram Coronary        1. Palpitations. She reports intermittent palpitations. At PCP office heart rate noted to be bradycardic at 55 bpm. Today's EKG with improved rate.  Anxiety increases fluttering feeling in chest. She has associated SOA, CP and AGOSTO.    Placed Ziwie Holter monitor, 14 day to evaluate for significance of bradycardia and for other arrhythmias.     2. AGOSTO. AGOSTO can be chest pain equilevant in women. She reports AGOSTO has developed over time but worsening.   12 lead ekg today showing improved rate. T wave inversion no longer evident in inferior leads. Septal infarct.     She has increased risk of ASCVD at 6.1%. She is port menopausal, smoker, under stress, and obese.     Ordered CTA of coronaries to evaluate for ischemic heart disease. No pre medication BB due to low heart rate. Discussed risk of radiation exposure and dye reaction. Benefit is assessing for calcium which is hardening of the arteries and for plaque build up and blockages. She is willing to proceed.     I spent 39 minutes caring for Natividad on this date of service. This time includes time spent by me in the following activities: preparing for the visit, reviewing tests, obtaining and/or reviewing a separately obtained history, performing a medically appropriate examination and/or evaluation, counseling and educating the patient/family/caregiver, ordering medications, tests, or procedures and documenting information in the medical record    Return in about 4 weeks (around 1/5/2023) for Recheck.    Noted completed on 12/13/22  This document has been electronically signed by DHARMESH Vidal on December 8, 2022 14:18 CST   Electronically signed by DHARMESH Vidal, 12/08/22, 2:18 PM CST.

## 2022-12-16 RX ORDER — CHOLECALCIFEROL (VITAMIN D3) 1250 MCG
CAPSULE ORAL
Qty: 4 CAPSULE | Refills: 48 | Status: SHIPPED | OUTPATIENT
Start: 2022-12-16

## 2022-12-17 LAB
QT INTERVAL: 376 MS
QTC INTERVAL: 417 MS

## 2022-12-29 ENCOUNTER — HOSPITAL ENCOUNTER (OUTPATIENT)
Dept: CT IMAGING | Facility: HOSPITAL | Age: 57
Discharge: HOME | End: 2022-12-29
Admitting: NURSE PRACTITIONER

## 2022-12-29 DIAGNOSIS — R06.09 DYSPNEA ON EXERTION: ICD-10-CM

## 2022-12-29 PROCEDURE — 0 IOPAMIDOL PER 1 ML: Performed by: NURSE PRACTITIONER

## 2022-12-29 PROCEDURE — 75574 CT ANGIO HRT W/3D IMAGE: CPT

## 2022-12-29 RX ORDER — SODIUM CHLORIDE 9 MG/ML
100 INJECTION, SOLUTION INTRAVENOUS
Status: COMPLETED | OUTPATIENT
Start: 2022-12-29 | End: 2022-12-29

## 2022-12-29 RX ADMIN — IOPAMIDOL 90 ML: 755 INJECTION, SOLUTION INTRAVENOUS at 07:34

## 2022-12-29 RX ADMIN — SODIUM CHLORIDE 100 ML: 9 INJECTION, SOLUTION INTRAVENOUS at 07:33

## 2022-12-30 ENCOUNTER — TELEPHONE (OUTPATIENT)
Dept: CARDIOLOGY | Facility: CLINIC | Age: 57
End: 2022-12-30

## 2022-12-30 NOTE — PROGRESS NOTES
Please call the patient regarding her her CT of coronaries was negative. Low risk test with calcium score  of 0 , no blockages/plaque seen

## 2022-12-30 NOTE — TELEPHONE ENCOUNTER
Tried to call pt no answer vml    ----- Message from DHARMESH Vidal sent at 12/30/2022  2:05 PM CST -----  Please call the patient regarding her her CT of coronaries was negative. Low risk test with calcium score  of 0 , no blockages/plaque seen

## 2022-12-30 NOTE — TELEPHONE ENCOUNTER
Contacted patient per jessica,  CT of coronaries was negative. Low risk test with calcium score  of 0 , no blockages/plaque seen. She voiced her understanding.     ----- Message from DHARMESH Vidal sent at 12/30/2022  2:05 PM CST -----  Please call the patient regarding her her CT of coronaries was negative. Low risk test with calcium score  of 0 , no blockages/plaque seen

## 2023-01-05 ENCOUNTER — OFFICE VISIT (OUTPATIENT)
Dept: CARDIOLOGY | Facility: CLINIC | Age: 58
End: 2023-01-05
Payer: COMMERCIAL

## 2023-01-05 VITALS
SYSTOLIC BLOOD PRESSURE: 130 MMHG | TEMPERATURE: 97.1 F | WEIGHT: 175 LBS | HEART RATE: 74 BPM | HEIGHT: 64 IN | BODY MASS INDEX: 29.88 KG/M2 | OXYGEN SATURATION: 97 % | DIASTOLIC BLOOD PRESSURE: 76 MMHG

## 2023-01-05 DIAGNOSIS — I49.3 PREMATURE VENTRICULAR COMPLEX: Primary | ICD-10-CM

## 2023-01-05 DIAGNOSIS — R07.9 CHEST PAIN, UNSPECIFIED TYPE: ICD-10-CM

## 2023-01-05 PROCEDURE — 99213 OFFICE O/P EST LOW 20 MIN: CPT | Performed by: NURSE PRACTITIONER

## 2023-01-05 RX ORDER — METOPROLOL SUCCINATE 25 MG/1
12.5 TABLET, EXTENDED RELEASE ORAL DAILY
Qty: 90 TABLET | Refills: 3 | Status: SHIPPED | OUTPATIENT
Start: 2023-01-05 | End: 2023-02-27 | Stop reason: DRUGHIGH

## 2023-01-05 RX ORDER — CHOLECALCIFEROL (VITAMIN D3) 1250 MCG
CAPSULE ORAL
COMMUNITY
Start: 2022-12-16 | End: 2023-01-05 | Stop reason: SDUPTHER

## 2023-01-05 NOTE — PROGRESS NOTES
Palpitations      Heart Problem  The current episode started 1 to 4 weeks ago (low heart rate). The problem occurs intermittently. The problem has been gradually worsening. Associated symptoms include chest pain. Pertinent negatives include no abdominal pain, coughing, diaphoresis, fever, headaches, myalgias, nausea, numbness or weakness. Nothing aggravates the symptoms. She has tried nothing for the symptoms. The treatment provided no relief.   Palpitations   This is a recurrent problem. The current episode started more than 1 month ago. The problem occurs intermittently. The problem has been gradually worsening. Nothing aggravates the symptoms. Associated symptoms include chest pain and an irregular heartbeat. Pertinent negatives include no anxiety, coughing, diaphoresis, dizziness, fever, malaise/fatigue, nausea, numbness, shortness of breath or weakness. She has tried nothing for the symptoms. The treatment provided no relief. Risk factors include post menopause, obesity, sedentary lifestyle, smoking/tobacco exposure and dyslipidemia.     Mrs. Natividad Montgomery is a 57 year old female with medical history of depression, GERD, and osteoarthritis. She has been referred to cardiology for palpitations and sinus bradycardia. EKG in PCP office also showed T wave inversion.     12/8/22, she presents for initial evaluation. She reports intermittent palpitations, chest pain, and AGOSTO. She is a current smoker and not interested in quitting at this time.  She reports that anxiety makes CP and \"fluttering\" worse. She has tried deep breathing which has helped some but not all the time.  CP is a pressure, heaviness that is mid sternal and does not radiate.    Today, she presents for follow-up for palpitations.  Holter monitor showed that she had 5% PVC burden.  No sustained wide-complex tachycardia noted.  Good average heart rate.  No significant sinus bradycardia and no pauses identified during monitoring period.  He reports that  she is feeling better still has occasional chest pressure and palpitations but they have are improving.  CTA of coronary arteries was also obtained and we went over this today normal coronary calcium score.  No identified plaquing or calcification in coronary arteries and no myocardial bridging.  LVEF was noted to be 65%.    The 10-year ASCVD risk score (Tania CARR, et al., 2019) is: 5.9%    Values used to calculate the score:      Age: 57 years      Sex: Female      Is Non- : No      Diabetic: No      Tobacco smoker: Yes      Systolic Blood Pressure: 130 mmHg      Is BP treated: No      HDL Cholesterol: 57 mg/dL      Total Cholesterol: 199 mg/dL    Cardiac Risk Factors:  hypercholesterolemia, smoking, Sedentary life style and Obesity    Past Medical History:   Diagnosis Date   • Allergic    • Anxiety    • Arthritis    • Breast cyst    • Bronchitis    • Colon polyp    • Depression    • DVT (deep venous thrombosis) ([facility])    • Fibrocystic breast    • GERD (gastroesophageal reflux disease)    • Hard to intubate    • Headache    • Strep throat    • Urinary tract infection      Past Surgical History:   Procedure Laterality Date   •  SECTION     • COLONOSCOPY     • EXCISION MASS LEG Right 2018    Procedure: EXCISION MASS RIGHT KNEE                             (LATEX ALLERGY);  Surgeon: Jose David Desai MD;  Location: [facility];  Service: Orthopedics   • SHOULDER SURGERY      right shoulder   • SUBTOTAL HYSTERECTOMY     • TONSILLECTOMY     • VAGINAL DILATATION      age 15     Social History     Socioeconomic History   • Marital status:    Tobacco Use   • Smoking status: Every Day     Packs/day: 0.25     Types: Cigarettes   • Smokeless tobacco: Never   Vaping Use   • Vaping Use: Never used   Substance and Sexual Activity   • Alcohol use: Yes     Comment: socially   • Drug use: No   • Sexual activity: Defer     Family History   Problem Relation Age of Onset   •  Heart disease Mother    • Hypertension Mother    • Lung cancer Father    • Hypertension Father    • Thyroid disease Sister    • Hyperlipidemia Sister    • Hypertension Sister    • Thyroid disease Brother    • Hypertension Brother    • Hyperlipidemia Brother    • No Known Problems Daughter    • Heart failure Maternal Grandmother    • Heart attack Maternal Grandfather    • Heart disease Paternal Grandmother    • No Known Problems Paternal Grandfather    • Hypertension Sister    • Hypertension Sister    • Deep vein thrombosis Other    • Diabetes Other    • Cancer Other    • Colon cancer Paternal Aunt        ALLERGIES:  Allergies   Allergen Reactions   • Latex Itching   • Sulfa Antibiotics Other (See Comments)     \"Run a high fever\"       Review of Systems   Constitutional: Negative for diaphoresis, fever, malaise/fatigue and night sweats.   HENT: Negative.    Cardiovascular: Positive for chest pain, irregular heartbeat and palpitations. Negative for dyspnea on exertion, leg swelling and orthopnea.   Respiratory: Negative for cough and shortness of breath.    Endocrine: Negative for polydipsia, polyphagia and polyuria.   Musculoskeletal: Negative for falls, muscle cramps, muscle weakness and myalgias.   Gastrointestinal: Negative for bloating, abdominal pain, melena and nausea.   Genitourinary: Negative.    Neurological: Negative for dizziness, headaches, light-headedness, numbness and weakness.   Psychiatric/Behavioral: Negative for altered mental status and hallucinations. The patient is not nervous/anxious.        Current Outpatient Medications   Medication Sig Dispense Refill   • 24HR Allergy Relief 180 MG tablet TAKE 1 TABLET BY MOUTH DAILY. 30 tablet 1   • brompheniramine-pseudoephedrine-DM 30-2-10 MG/5ML syrup Take 5 mL by mouth 4 (Four) Times a Day As Needed for Cough. 120 mL 1   • celecoxib (CeleBREX) 100 MG capsule TAKE 1 CAPSULE BY MOUTH 2 (TWO) TIMES A DAY. 60 capsule 1   • Cholecalciferol (Vitamin D3) 1.25  MG (82847 UT) capsule TAKE ONE CAPSULE BY MOUTH ONCE A WEEK 4 capsule 48   • Docusate Sodium (COLACE PO) Take  by mouth Daily.     • fluticasone (FLONASE) 50 MCG/ACT nasal spray      • ibuprofen (ADVIL,MOTRIN) 800 MG tablet Take 800 mg by mouth Every 6 (Six) Hours As Needed for Mild Pain.     • omega-3 acid ethyl esters (LOVAZA) 1 g capsule TAKE 1 CAPSULE BY MOUTH DAILY WITH BREAKFAST. 30 capsule 2   • omeprazole (priLOSEC) 40 MG capsule TAKE ONE CAPSULE BY MOUTH EVERY DAY 30 capsule 2   • Procto-Med HC 2.5 % rectal cream INSERT INTO THE RECTUM 2 (TWO) TIMES A DAY. 28 g 1   • QUEtiapine (SEROquel) 50 MG tablet TAKE 1 TABLET BY MOUTH EVERY NIGHT. 30 tablet 3   • metoprolol succinate XL (TOPROL-XL) 25 MG 24 hr tablet Take 0.5 tablets by mouth Daily. 90 tablet 3     No current facility-administered medications for this visit.       OBJECTIVE:    Physical Exam:   Vitals reviewed.   Constitutional:       Appearance: Normal and healthy appearance. Well-developed, well-groomed and not in distress. Not toxic-appearing or diaphoretic.   Eyes:      General: Lids are normal.         Right eye: No discharge.         Left eye: No discharge.      Conjunctiva/sclera: Conjunctivae normal.   HENT:      Head: Normocephalic and atraumatic.   Neck:      Vascular: JVD normal.   Pulmonary:      Effort: Pulmonary effort is normal.      Breath sounds: Normal breath sounds and air entry. No decreased air movement. No rhonchi.   Cardiovascular:      Normal rate. Regular rhythm. Normal S1 with normal intensity. Normal S2 with normal intensity.      Murmurs: There is no murmur.      No gallop.   Edema:     Peripheral edema absent.   Skin:     General: Skin is warm and dry.      Capillary Refill: Capillary refill takes less than 2 seconds.      Coloration: Skin is not pale.   Neurological:      Mental Status: Alert, oriented to person, place, and time and oriented to person, place and time.   Psychiatric:         Attention and Perception:  Attention normal.         Mood and Affect: Mood normal.         Speech: Speech normal.         Behavior: Behavior normal. Behavior is cooperative.       Vitals:    01/05/23 1341   BP: 130/76   BP Location: Left arm   Patient Position: Sitting   Cuff Size: Adult   Pulse: 74   Temp: 97.1 °F (36.2 °C)   SpO2: 97%   Weight: 79.4 kg (175 lb)   Height: 162.6 cm (64\")       DATA REVIEWED:       CT Angiogram Coronary    Result Date: 12/29/2022  Normal study. No evidence of any plaque or narrowing in the coronary arteries. Calcium score 0, low risk for coronary disease. Normal functional analysis. Computer-assisted calculation of left ventricular ejection fraction 65%. Electronically signed by:  Hai Maradiaga MD  12/29/2022 9:42 AM CST Workstation: [id number]    Mammo Screening Digital Tomosynthesis Bilateral With CAD    Result Date: 12/9/2022  No mammographic evidence of malignancy. BI-RADS category 2: Benign findings. Recommendation: Annual mammography Electronically signed by:  Loki Kraus MD  12/9/2022 10:37 AM CST Workstation: [id number]     CXR:     CT:     Labs: BMP, CBC, LIPID, TSH  Lab Results   Component Value Date    GLUCOSE 92 10/28/2022    CALCIUM 9.1 10/28/2022     10/28/2022    K 4.0 10/28/2022    CO2 24.3 10/28/2022     10/28/2022    BUN 15 10/28/2022    CREATININE 0.73 10/28/2022    EGFRIFNONA 86 07/19/2021    BCR 20.5 10/28/2022    ANIONGAP 10.7 10/28/2022     Lab Results   Component Value Date    WBC 5.27 10/28/2022    HGB 13.9 10/28/2022    HCT 40.6 10/28/2022    MCV 90.4 10/28/2022     10/28/2022     Lab Results   Component Value Date    CHOL 199 10/28/2022    CHOL 196 07/19/2021    CHOL 210 (H) 10/17/2019     Lab Results   Component Value Date    TRIG 91 10/28/2022    TRIG 112 07/19/2021    TRIG 73 10/17/2019     Lab Results   Component Value Date    HDL 57 10/28/2022    HDL 51 07/19/2021    HDL 64 (H) 10/17/2019     No components found for: LDLCALC  Lab Results   Component Value Date      (H) 10/28/2022     (H) 07/19/2021     (H) 10/17/2019     No results found for: HDLLDLRATIO  No components found for: CHOLHDL  Lab Results   Component Value Date    TSH 2.110 10/28/2022     No results found for: PROBNP  EKG: Today       10/28/22      The following portions of the patient's history were reviewed and updated as appropriate: allergies, current medications, past family history, past medical history, past social history, past surgical history and problem list.  Old records reviewed and pertinent information is included in the above objective data.     ASSESSMENT/PLAN:     Diagnosis Plan   1. Premature ventricular complex  metoprolol succinate XL (TOPROL-XL) 25 MG 24 hr tablet      2. Chest pain, unspecified type          1. Palpitations.  Reports that palpitations have gotten some better.  Has chest pressure with palpitations but overall she says that they have improved.  Holter monitor showed that she has 5% PVC burden and that 11 triggers were sinus rhythm with PVCs.  She reports intermittent palpitation    Trial low-dose beta-blocker, Toprol-XL 12.5 mg daily.    2.  Chest pain, she describes chest pressure but has gotten better.  Dyspnea on exertion has gotten better.  CTA of coronary arteries showed normal calcium score of 0 and no coronary plaquing or calcification noted.  No myocardial bridging.  Normal LVEF at 65%    I spent 20 minutes caring for Natividad on this date of service. This time includes time spent by me in the following activities: preparing for the visit, reviewing tests, obtaining and/or reviewing a separately obtained history, performing a medically appropriate examination and/or evaluation, counseling and educating the patient/family/caregiver, ordering medications, tests, or procedures and documenting information in the medical record    Return in about 6 weeks (around 2/16/2023) for Recheck.        This document has been electronically signed by Padmini ALCANTAR  DHARMESH Ag on January 5, 2023 16:31 CST   Electronically signed by DHARMESH Vidal, 01/05/23, 4:31 PM CST.

## 2023-01-13 DIAGNOSIS — G89.29 CHRONIC CERVICAL PAIN: ICD-10-CM

## 2023-01-13 DIAGNOSIS — J30.2 SEASONAL ALLERGIES: ICD-10-CM

## 2023-01-13 DIAGNOSIS — M54.2 CHRONIC CERVICAL PAIN: ICD-10-CM

## 2023-01-13 DIAGNOSIS — M54.12 CERVICAL RADICULOPATHY: ICD-10-CM

## 2023-01-13 RX ORDER — FEXOFENADINE HYDROCHLORIDE 180 MG/1
TABLET ORAL
Qty: 30 TABLET | Refills: 1 | Status: SHIPPED | OUTPATIENT
Start: 2023-01-13 | End: 2023-03-10

## 2023-01-13 RX ORDER — CELECOXIB 100 MG/1
100 CAPSULE ORAL 2 TIMES DAILY
Qty: 60 CAPSULE | Refills: 1 | Status: SHIPPED | OUTPATIENT
Start: 2023-01-13 | End: 2023-03-10

## 2023-02-10 DIAGNOSIS — K21.9 GASTROESOPHAGEAL REFLUX DISEASE WITHOUT ESOPHAGITIS: ICD-10-CM

## 2023-02-10 DIAGNOSIS — F31.9 BIPOLAR DEPRESSION: ICD-10-CM

## 2023-02-10 RX ORDER — QUETIAPINE FUMARATE 50 MG/1
50 TABLET, FILM COATED ORAL NIGHTLY
Qty: 30 TABLET | Refills: 3 | Status: SHIPPED | OUTPATIENT
Start: 2023-02-10

## 2023-02-10 RX ORDER — OMEGA-3-ACID ETHYL ESTERS 1 G/1
CAPSULE, LIQUID FILLED ORAL
Qty: 30 CAPSULE | Refills: 2 | Status: SHIPPED | OUTPATIENT
Start: 2023-02-10

## 2023-02-10 RX ORDER — OMEPRAZOLE 40 MG/1
CAPSULE, DELAYED RELEASE ORAL
Qty: 30 CAPSULE | Refills: 2 | Status: SHIPPED | OUTPATIENT
Start: 2023-02-10 | End: 2023-03-27

## 2023-02-16 ENCOUNTER — TELEPHONE (OUTPATIENT)
Dept: FAMILY MEDICINE CLINIC | Facility: CLINIC | Age: 58
End: 2023-02-16
Payer: COMMERCIAL

## 2023-02-16 DIAGNOSIS — F41.9 ANXIETY: Primary | ICD-10-CM

## 2023-02-16 RX ORDER — HYDROXYZINE HYDROCHLORIDE 10 MG/1
10 TABLET, FILM COATED ORAL EVERY 8 HOURS PRN
Qty: 60 TABLET | Refills: 1 | Status: SHIPPED | OUTPATIENT
Start: 2023-02-16 | End: 2023-03-29 | Stop reason: SDUPTHER

## 2023-02-16 NOTE — TELEPHONE ENCOUNTER
Pt is dealing with caring for her mom and having extreme stress and taking heart meds , taking toprol but needs to know if there is anything Lisa can give her to help her through this and seemed to be very emotional     [facility]     Pt [phone]

## 2023-02-17 NOTE — TELEPHONE ENCOUNTER
Per DHARMESH Gonzalez, Ms. Montgomery has been called with new medication information.     Lisa Henry APRN  You 2 hours ago (3:44 PM)     I have sent a prescription for Atarax to her pharmacy.  She can take 1 tablet every 8 hours as needed.  Please make sure she understands this may cause some drowsiness.

## 2023-02-22 DIAGNOSIS — G89.29 CHRONIC PAIN OF RIGHT ANKLE: Primary | ICD-10-CM

## 2023-02-22 DIAGNOSIS — M25.571 CHRONIC PAIN OF RIGHT ANKLE: Primary | ICD-10-CM

## 2023-02-27 ENCOUNTER — OFFICE VISIT (OUTPATIENT)
Dept: CARDIOLOGY | Facility: CLINIC | Age: 58
End: 2023-02-27
Payer: COMMERCIAL

## 2023-02-27 ENCOUNTER — OFFICE VISIT (OUTPATIENT)
Dept: ORTHOPEDIC SURGERY | Facility: CLINIC | Age: 58
End: 2023-02-27
Payer: COMMERCIAL

## 2023-02-27 VITALS — WEIGHT: 169.7 LBS | BODY MASS INDEX: 28.97 KG/M2 | HEIGHT: 64 IN

## 2023-02-27 VITALS
WEIGHT: 169 LBS | SYSTOLIC BLOOD PRESSURE: 142 MMHG | HEART RATE: 84 BPM | DIASTOLIC BLOOD PRESSURE: 80 MMHG | HEIGHT: 64 IN | OXYGEN SATURATION: 98 % | BODY MASS INDEX: 28.85 KG/M2

## 2023-02-27 DIAGNOSIS — M25.571 CHRONIC PAIN OF RIGHT ANKLE: Primary | ICD-10-CM

## 2023-02-27 DIAGNOSIS — I49.3 PREMATURE VENTRICULAR COMPLEX: ICD-10-CM

## 2023-02-27 DIAGNOSIS — G89.29 CHRONIC PAIN OF RIGHT ANKLE: Primary | ICD-10-CM

## 2023-02-27 DIAGNOSIS — R07.9 CHEST PAIN, UNSPECIFIED TYPE: ICD-10-CM

## 2023-02-27 DIAGNOSIS — R00.2 PALPITATIONS: Primary | ICD-10-CM

## 2023-02-27 PROCEDURE — 99214 OFFICE O/P EST MOD 30 MIN: CPT | Performed by: NURSE PRACTITIONER

## 2023-02-27 RX ORDER — METOPROLOL SUCCINATE 25 MG/1
25 TABLET, EXTENDED RELEASE ORAL DAILY
Qty: 90 TABLET | Refills: 3 | Status: SHIPPED | OUTPATIENT
Start: 2023-02-27

## 2023-02-27 NOTE — PROGRESS NOTES
"  Natividad Montgomery is a 57 y.o. female   Primary provider:  Lisa Henry APRN       Chief Complaint   Patient presents with   • Right Ankle - Pain   • Establish Care       HISTORY OF PRESENT ILLNESS:      Mrs. Randall is a 57-year-old female who presents today with complaints of chronic right ankle pain.  She reports injuring her ankle years ago.  She reports she had a fracture.  She reports that several days ago pain became much more intense.  She reports pain is severe and constant.  Pain is a grinding, stabbing, aching pain.  Pain is associate with intermittent swelling.  Pain is worse with standing and walking.  She reports being able to bear weight.  Sometimes pain causes her to limp, other times pain is not so bad.  She is sent for x-rays.    Pain  This is a chronic problem. The current episode started more than 1 year ago (1999). The problem occurs constantly. Associated symptoms include chest pain and joint swelling. Associated symptoms comments: Grinding, stabbing, aching, swelling. The symptoms are aggravated by standing and walking. She has tried acetaminophen, NSAIDs, ice and heat for the symptoms.        CONCURRENT MEDICAL HISTORY:    Past Medical History:   Diagnosis Date   • Allergic    • Anxiety    • Arthritis    • Breast cyst    • Bronchitis    • Colon polyp    • Depression    • DVT (deep venous thrombosis) (Former[facility]) 1989   • Fibrocystic breast    • GERD (gastroesophageal reflux disease)    • Hard to intubate    • Headache    • Strep throat    • Urinary tract infection        Allergies   Allergen Reactions   • Latex Itching   • Sulfa Antibiotics Other (See Comments)     \"Run a high fever\"         Current Outpatient Medications:   •  24HR Allergy Relief 180 MG tablet, TAKE 1 TABLET BY MOUTH DAILY., Disp: 30 tablet, Rfl: 1  •  brompheniramine-pseudoephedrine-DM 30-2-10 MG/5ML syrup, Take 5 mL by mouth 4 (Four) Times a Day As Needed for Cough., Disp: 120 mL, Rfl: 1  •  celecoxib (CeleBREX) 100 MG " capsule, TAKE 1 CAPSULE BY MOUTH 2 (TWO) TIMES A DAY., Disp: 60 capsule, Rfl: 1  •  Cholecalciferol (Vitamin D3) 1.25 MG (08141 UT) capsule, TAKE ONE CAPSULE BY MOUTH ONCE A WEEK, Disp: 4 capsule, Rfl: 48  •  Docusate Sodium (COLACE PO), Take  by mouth Daily., Disp: , Rfl:   •  fluticasone (FLONASE) 50 MCG/ACT nasal spray, , Disp: , Rfl:   •  hydrOXYzine (ATARAX) 10 MG tablet, Take 1 tablet by mouth Every 8 (Eight) Hours As Needed for Anxiety., Disp: 60 tablet, Rfl: 1  •  ibuprofen (ADVIL,MOTRIN) 800 MG tablet, Take 800 mg by mouth Every 6 (Six) Hours As Needed for Mild Pain., Disp: , Rfl:   •  metoprolol succinate XL (TOPROL-XL) 25 MG 24 hr tablet, Take 0.5 tablets by mouth Daily., Disp: 90 tablet, Rfl: 3  •  omega-3 acid ethyl esters (LOVAZA) 1 g capsule, TAKE 1 CAPSULE BY MOUTH DAILY WITH BREAKFAST., Disp: 30 capsule, Rfl: 2  •  omeprazole (priLOSEC) 40 MG capsule, TAKE ONE CAPSULE BY MOUTH EVERY DAY, Disp: 30 capsule, Rfl: 2  •  Procto-Med HC 2.5 % rectal cream, INSERT INTO THE RECTUM 2 (TWO) TIMES A DAY., Disp: 28 g, Rfl: 1  •  QUEtiapine (SEROquel) 50 MG tablet, TAKE 1 TABLET BY MOUTH EVERY NIGHT., Disp: 30 tablet, Rfl: 3    Past Surgical History:   Procedure Laterality Date   •  SECTION     • COLONOSCOPY     • EXCISION MASS LEG Right 2018    Procedure: EXCISION MASS RIGHT KNEE                             (LATEX ALLERGY);  Surgeon: Jose David Desai MD;  Location: [facility];  Service: Orthopedics   • SHOULDER SURGERY      right shoulder   • SUBTOTAL HYSTERECTOMY     • TONSILLECTOMY     • VAGINAL DILATATION      age 15       Family History   Problem Relation Age of Onset   • Heart disease Mother    • Hypertension Mother    • Lung cancer Father    • Hypertension Father    • Thyroid disease Sister    • Hyperlipidemia Sister    • Hypertension Sister    • Thyroid disease Brother    • Hypertension Brother    • Hyperlipidemia Brother    • No Known Problems Daughter    • Heart failure Maternal  "Grandmother    • Heart attack Maternal Grandfather    • Heart disease Paternal Grandmother    • No Known Problems Paternal Grandfather    • Hypertension Sister    • Hypertension Sister    • Deep vein thrombosis Other    • Diabetes Other    • Cancer Other    • Colon cancer Paternal Aunt        Social History     Socioeconomic History   • Marital status:    Tobacco Use   • Smoking status: Every Day     Packs/day: 0.25     Types: Cigarettes   • Smokeless tobacco: Never   Vaping Use   • Vaping Use: Never used   Substance and Sexual Activity   • Alcohol use: Yes     Comment: socially   • Drug use: No   • Sexual activity: Defer        Review of Systems   HENT: Positive for tinnitus.    Cardiovascular: Positive for chest pain and palpitations.   Endocrine: Positive for polydipsia.   Musculoskeletal: Positive for joint swelling.        Stiffness, muscle pain   Psychiatric/Behavioral: The patient is nervous/anxious.    All other systems reviewed and are negative.      PHYSICAL EXAMINATION:       Ht 162.6 cm (64\")   Wt 77 kg (169 lb 11.2 oz)   LMP 01/01/1999 (Approximate)   BMI 29.13 kg/m²     Physical Exam  Vitals and nursing note reviewed.   Constitutional:       General: She is not in acute distress.     Appearance: She is well-developed. She is not toxic-appearing.   HENT:      Head: Normocephalic.   Pulmonary:      Effort: Pulmonary effort is normal. No respiratory distress.   Skin:     General: Skin is warm and dry.   Neurological:      Mental Status: She is alert and oriented to person, place, and time.   Psychiatric:         Behavior: Behavior normal.         Thought Content: Thought content normal.         Judgment: Judgment normal.         GAIT:     []  Normal  [x]  Antalgic    Assistive device: [x]  None  []  Walker     []  Crutches  []  Cane     []  Wheelchair  []  Stretcher    Right Ankle Exam     Tenderness   The patient is experiencing tenderness in the ATF and CF.  Swelling: none    Range of Motion "   The patient has normal right ankle ROM.    Comments:  Good range of motion.  No pain with range of motion.  No bony tenderness.  Patient has tenderness of ligaments of lateral ankle.  Pain elicited with varus tilt test.  No evidence of infection.  Still pulses intact.                  No results found.        ASSESSMENT:    Diagnoses and all orders for this visit:    Chronic pain of right ankle  -     Ambulatory Referral to Physical Therapy Evaluate and treat; (as indicated ); Stretching, Strengthening, ROM          PLAN    X-rays reviewed, no acute bony abnormality identified.  No injury.  Patient reports acute on chronic ankle pain.  After discussing available treatment options including benefits and alternatives, patient opts to proceed with formal physical therapy and ankle brace today.  Signs and symptoms to report including when to seek care explained to patient.  Patient to return in 4 weeks for recheck or sooner if needed.    Recommend the following:    -Rest and activity modification as tolerated and based on pain.  -Modified weightbearing of the affected extremity with use of a cane or walker as needed.  -Gradual progression of weightbearing and activity as pain and swelling allow.  -Conditioning and strengthening exercises of the bilateral legs.  -Elevation and ice therapy to the affected ankle to minimize pain/swelling/inflammation.               Return in about 4 weeks (around 3/27/2023), or if symptoms worsen or fail to improve.    EMR Dragon/Transciption Disclaimer: Some of this note may be an electronic transcription/translation of spoken language to printed text.  The electronic translation of spoken language may permit erroneous, or at times, nonsensical words or phrases to be inadvertently transcribed. Although I have reviewed the note for such errors, some may still exist.       This document has been electronically signed by Kira BARROSO on February 27, 2023 10:32 CST       anxious

## 2023-02-27 NOTE — PROGRESS NOTES
"Premature ventricular complex      Palpitations   This is a recurrent problem. The current episode started more than 1 month ago. The problem occurs intermittently. The problem has been gradually worsening. Nothing aggravates the symptoms. Pertinent negatives include no coughing, diaphoresis, dizziness, irregular heartbeat, malaise/fatigue, nausea, shortness of breath or weakness. She has tried nothing for the symptoms. The treatment provided no relief. Risk factors include post menopause, obesity, sedentary lifestyle, smoking/tobacco exposure and dyslipidemia.   Heart Problem  The current episode started 1 to 4 weeks ago (low heart rate). The problem occurs intermittently. The problem has been gradually worsening. Pertinent negatives include no abdominal pain, coughing, diaphoresis, headaches, nausea or weakness. Nothing aggravates the symptoms. She has tried nothing for the symptoms. The treatment provided no relief.     Mrs. Natividad Montgomery is a 57 year old female with medical history of depression, GERD, and osteoarthritis. She has been referred to cardiology for palpitations and sinus bradycardia. EKG in PCP office also showed T wave inversion.     12/8/22, she presents for initial evaluation. She reports intermittent palpitations, chest pain, and AGOSTO. She is a current smoker and not interested in quitting at this time.  She reports that anxiety makes CP and \"fluttering\" worse. She has tried deep breathing which has helped some but not all the time.  CP is a pressure, heaviness that is mid sternal and does not radiate.    1/5/23, she presents for follow-up for palpitations.  Holter monitor showed that she had 5% PVC burden.  No sustained wide-complex tachycardia noted.  Good average heart rate.  No significant sinus bradycardia and no pauses identified during monitoring period.  He reports that she is feeling better still has occasional chest pressure and palpitations but they have are improving.  CTA of coronary " arteries was also obtained and we went over this today normal coronary calcium score.  No identified plaquing or calcification in coronary arteries and no myocardial bridging.  LVEF was noted to be 65%.    Today, she reports occasional palpitations and left arm pain.  She is under a lot of stress with caring for her bedridden mother.  She has siblings that help but not enough.  Recently PCP has put her on anxiety medication.  She is also hurt her left ankle and seeing orthopedics and has been referred to therapy for her right ankle.  Capitation's come and go but nothing persistent.  She noticed the left arm pain when palpitations are present.  Outside of these things that she is doing okay.    The 10-year ASCVD risk score (Tania CARR, et al., 2019) is: 7%    Values used to calculate the score:      Age: 57 years      Sex: Female      Is Non- : No      Diabetic: No      Tobacco smoker: Yes      Systolic Blood Pressure: 142 mmHg      Is BP treated: No      HDL Cholesterol: 57 mg/dL      Total Cholesterol: 199 mg/dL    Cardiac Risk Factors:  hypercholesterolemia, smoking, Sedentary life style and Obesity    Past Medical History:   Diagnosis Date   • Allergic    • Anxiety    • Arthritis    • Breast cyst    • Bronchitis    • Colon polyp    • Depression    • DVT (deep venous thrombosis) (Former[facility])    • Fibrocystic breast    • GERD (gastroesophageal reflux disease)    • Hard to intubate    • Headache    • Strep throat    • Urinary tract infection      Past Surgical History:   Procedure Laterality Date   •  SECTION     • COLONOSCOPY     • EXCISION MASS LEG Right 2018    Procedure: EXCISION MASS RIGHT KNEE                             (LATEX ALLERGY);  Surgeon: Jose David Desai MD;  Location: [facility];  Service: Orthopedics   • SHOULDER SURGERY      right shoulder   • SUBTOTAL HYSTERECTOMY     • TONSILLECTOMY     • VAGINAL DILATATION      age 15     Social History  "    Socioeconomic History   • Marital status:    Tobacco Use   • Smoking status: Every Day     Packs/day: 1.00     Types: Cigarettes   • Smokeless tobacco: Never   Vaping Use   • Vaping Use: Never used   Substance and Sexual Activity   • Alcohol use: Yes     Comment: socially   • Drug use: Not Currently     Types: Methamphetamines   • Sexual activity: Defer     Family History   Problem Relation Age of Onset   • Heart disease Mother    • Hypertension Mother    • Lung cancer Father    • Hypertension Father    • Thyroid disease Sister    • Hyperlipidemia Sister    • Hypertension Sister    • Thyroid disease Brother    • Hypertension Brother    • Hyperlipidemia Brother    • No Known Problems Daughter    • Heart failure Maternal Grandmother    • Heart attack Maternal Grandfather    • Heart disease Paternal Grandmother    • No Known Problems Paternal Grandfather    • Hypertension Sister    • Hypertension Sister    • Deep vein thrombosis Other    • Diabetes Other    • Cancer Other    • Colon cancer Paternal Aunt        ALLERGIES:  Allergies   Allergen Reactions   • Latex Itching   • Sulfa Antibiotics Other (See Comments)     \"Run a high fever\"       Review of Systems   Constitutional: Negative for diaphoresis, malaise/fatigue and night sweats.   HENT: Negative.    Cardiovascular: Positive for palpitations (occassional). Negative for dyspnea on exertion, irregular heartbeat, leg swelling and orthopnea.        Left shoulder pain   Respiratory: Negative for cough and shortness of breath.    Endocrine: Negative.    Gastrointestinal: Negative for bloating, abdominal pain and nausea.   Neurological: Negative for dizziness, headaches, light-headedness and weakness.       Current Outpatient Medications   Medication Sig Dispense Refill   • 24HR Allergy Relief 180 MG tablet TAKE 1 TABLET BY MOUTH DAILY. 30 tablet 1   • brompheniramine-pseudoephedrine-DM 30-2-10 MG/5ML syrup Take 5 mL by mouth 4 (Four) Times a Day As Needed " for Cough. 120 mL 1   • celecoxib (CeleBREX) 100 MG capsule TAKE 1 CAPSULE BY MOUTH 2 (TWO) TIMES A DAY. 60 capsule 1   • Cholecalciferol (Vitamin D3) 1.25 MG (05709 UT) capsule TAKE ONE CAPSULE BY MOUTH ONCE A WEEK 4 capsule 48   • Docusate Sodium (COLACE PO) Take  by mouth Daily.     • fluticasone (FLONASE) 50 MCG/ACT nasal spray      • hydrOXYzine (ATARAX) 10 MG tablet Take 1 tablet by mouth Every 8 (Eight) Hours As Needed for Anxiety. 60 tablet 1   • ibuprofen (ADVIL,MOTRIN) 800 MG tablet Take 800 mg by mouth Every 6 (Six) Hours As Needed for Mild Pain.     • omega-3 acid ethyl esters (LOVAZA) 1 g capsule TAKE 1 CAPSULE BY MOUTH DAILY WITH BREAKFAST. 30 capsule 2   • omeprazole (priLOSEC) 40 MG capsule TAKE ONE CAPSULE BY MOUTH EVERY DAY 30 capsule 2   • Procto-Med HC 2.5 % rectal cream INSERT INTO THE RECTUM 2 (TWO) TIMES A DAY. 28 g 1   • QUEtiapine (SEROquel) 50 MG tablet TAKE 1 TABLET BY MOUTH EVERY NIGHT. 30 tablet 3   • metoprolol succinate XL (TOPROL-XL) 25 MG 24 hr tablet Take 1 tablet by mouth Daily. 90 tablet 3     No current facility-administered medications for this visit.       OBJECTIVE:    Physical Exam:   Vitals reviewed.   Constitutional:       Appearance: Normal and healthy appearance. Well-developed, well-groomed and not in distress. Not toxic-appearing or diaphoretic.   Eyes:      General: Lids are normal.         Right eye: No discharge.         Left eye: No discharge.   HENT:      Head: Normocephalic and atraumatic.      Right Ear: External ear normal.      Left Ear: External ear normal.   Neck:      Vascular: JVD normal.   Pulmonary:      Effort: Pulmonary effort is normal.      Breath sounds: Normal breath sounds and air entry. No decreased air movement. No rhonchi.   Cardiovascular:      Normal rate. Regular rhythm. Normal S1 with normal intensity. Normal S2 with normal intensity.      Murmurs: There is no murmur.      No gallop.   Edema:     Peripheral edema absent.   Skin:     General:  "Skin is warm and dry.      Capillary Refill: Capillary refill takes less than 2 seconds.   Neurological:      Mental Status: Alert, oriented to person, place, and time and oriented to person, place and time.   Psychiatric:         Attention and Perception: Attention normal.         Mood and Affect: Mood normal.       Vitals:    02/27/23 1443   BP: 142/80   Pulse: 84   SpO2: 98%   Weight: 76.7 kg (169 lb)   Height: 162.6 cm (64\")       DATA REVIEWED:       No radiology results for the last 30 days.  CXR:     CT:     Labs: BMP, CBC, LIPID, TSH  Lab Results   Component Value Date    GLUCOSE 92 10/28/2022    CALCIUM 9.1 10/28/2022     10/28/2022    K 4.0 10/28/2022    CO2 24.3 10/28/2022     10/28/2022    BUN 15 10/28/2022    CREATININE 0.73 10/28/2022    EGFRIFNONA 86 07/19/2021    BCR 20.5 10/28/2022    ANIONGAP 10.7 10/28/2022     Lab Results   Component Value Date    WBC 5.27 10/28/2022    HGB 13.9 10/28/2022    HCT 40.6 10/28/2022    MCV 90.4 10/28/2022     10/28/2022     Lab Results   Component Value Date    CHOL 199 10/28/2022    CHOL 196 07/19/2021    CHOL 210 (H) 10/17/2019     Lab Results   Component Value Date    TRIG 91 10/28/2022    TRIG 112 07/19/2021    TRIG 73 10/17/2019     Lab Results   Component Value Date    HDL 57 10/28/2022    HDL 51 07/19/2021    HDL 64 (H) 10/17/2019     No components found for: LDLCALC  Lab Results   Component Value Date     (H) 10/28/2022     (H) 07/19/2021     (H) 10/17/2019     No results found for: HDLLDLRATIO  No components found for: CHOLHDL  Lab Results   Component Value Date    TSH 2.110 10/28/2022     No results found for: PROBNP  EKG: Today       10/28/22      The following portions of the patient's history were reviewed and updated as appropriate: allergies, current medications, past family history, past medical history, past social history, past surgical history and problem list.  Old records reviewed and pertinent information " is included in the above objective data.     ASSESSMENT/PLAN:     Diagnosis Plan   1. Palpitations  metoprolol succinate XL (TOPROL-XL) 25 MG 24 hr tablet      2. Premature ventricular complex  metoprolol succinate XL (TOPROL-XL) 25 MG 24 hr tablet      3. Chest pain, unspecified type  metoprolol succinate XL (TOPROL-XL) 25 MG 24 hr tablet        1./2. Palpitations/ PVC's.  Palpitations occur occasionally now and associated with left shoulder pain.  Past holter monitor showed that she has 5% PVC burden and that 11 triggers were sinus rhythm with PVCs.  She reports intermittent palpitation    Pressure slightly elevated today will increase Toprol XL to 25 mg daily .    2.  Chest pain, resolved.  CTA of coronary arteries showed normal calcium score of 0 and no coronary plaquing or calcification noted.  No myocardial bridging.  Normal LVEF at 65%.  Complaining of left shoulder pain when she experiences palpitations.  -Increase Toprol-XL to 25 mg daily    I spent 30 minutes caring for Natividad on this date of service. This time includes time spent by me in the following activities: preparing for the visit, reviewing tests, obtaining and/or reviewing a separately obtained history, performing a medically appropriate examination and/or evaluation, counseling and educating the patient/family/caregiver, ordering medications, tests, or procedures and documenting information in the medical record    Return in about 3 months (around 5/27/2023).          This document has been electronically signed by DHARMESH Vidal on February 27, 2023 15:57 CST   Electronically signed by DHARMESH Vidal, 02/27/23, 3:57 PM CST.

## 2023-03-08 ENCOUNTER — HOSPITAL ENCOUNTER (OUTPATIENT)
Dept: PHYSICAL THERAPY | Facility: HOSPITAL | Age: 58
Setting detail: THERAPIES SERIES
Discharge: HOME | End: 2023-03-08
Payer: COMMERCIAL

## 2023-03-08 DIAGNOSIS — G89.29 CHRONIC PAIN OF RIGHT ANKLE: Primary | ICD-10-CM

## 2023-03-08 DIAGNOSIS — M25.571 CHRONIC PAIN OF RIGHT ANKLE: Primary | ICD-10-CM

## 2023-03-08 PROCEDURE — 97162 PT EVAL MOD COMPLEX 30 MIN: CPT

## 2023-03-08 NOTE — THERAPY EVALUATION
Outpatient Physical Therapy Ortho Initial Evaluation  [facility]     Patient Name: Natividad Montgomery  : 1965  MRN: [id number]  Today's Date: 3/8/2023      Visit Date: 2023   Attendance:  (TBD approved)  Subjective Improvement: n/a  Next MD Visit: 3/27/23  Recert Date: 3/29/23    Therapy Diagnosis: chronic R ankle pain      Patient Active Problem List   Diagnosis   • Chronic pain of right knee   • Cyst of right knee joint   • Depression   • Patellofemoral arthritis of right knee   • GERD (gastroesophageal reflux disease)   • Acute pain of right shoulder        Past Medical History:   Diagnosis Date   • Allergic    • Anxiety    • Arthritis    • Breast cyst    • Bronchitis    • Colon polyp    • Depression    • DVT (deep venous thrombosis) ([facility])    • Fibrocystic breast    • GERD (gastroesophageal reflux disease)    • Hard to intubate    • Headache    • Strep throat    • Urinary tract infection         Past Surgical History:   Procedure Laterality Date   •  SECTION     • COLONOSCOPY     • EXCISION MASS LEG Right 2018    Procedure: EXCISION MASS RIGHT KNEE                             (LATEX ALLERGY);  Surgeon: Jose David Desai MD;  Location: [facility];  Service: Orthopedics   • SHOULDER SURGERY      right shoulder   • SUBTOTAL HYSTERECTOMY     • TONSILLECTOMY     • VAGINAL DILATATION      age 15     Current Outpatient Medications   Medication Instructions   • 24HR Allergy Relief 180 MG tablet TAKE 1 TABLET BY MOUTH DAILY.   • brompheniramine-pseudoephedrine-DM 30-2-10 MG/5ML syrup 5 mL, Oral, 4 Times Daily PRN   • celecoxib (CELEBREX) 100 mg, Oral, 2 Times Daily   • Cholecalciferol (Vitamin D3) 1.25 MG (39346 UT) capsule TAKE ONE CAPSULE BY MOUTH ONCE A WEEK   • Docusate Sodium (COLACE PO) Oral, Daily   • fluticasone (FLONASE) 50 MCG/ACT nasal spray No dose, route, or frequency recorded.   • hydrOXYzine (ATARAX) 10 mg, Oral, Every 8 Hours PRN   • ibuprofen  "(ADVIL,MOTRIN) 800 mg, Oral, Every 6 Hours PRN   • metoprolol succinate XL (TOPROL-XL) 25 mg, Oral, Daily   • omega-3 acid ethyl esters (LOVAZA) 1 g capsule TAKE 1 CAPSULE BY MOUTH DAILY WITH BREAKFAST.   • omeprazole (priLOSEC) 40 MG capsule TAKE ONE CAPSULE BY MOUTH EVERY DAY   • Procto-Med HC 2.5 % rectal cream INSERT INTO THE RECTUM 2 (TWO) TIMES A DAY.   • QUEtiapine (SEROQUEL) 50 mg, Oral, Nightly       Allergies   Allergen Reactions   • Latex Itching   • Sulfa Antibiotics Other (See Comments)     \"Run a high fever\"         Visit Dx:     ICD-10-CM ICD-9-CM   1. Chronic pain of right ankle  M25.571 719.47    G89.29 338.29          Patient History     Row Name 03/08/23 1400             History    Chief Complaint Pain  -      Type of Pain Ankle pain  right  -      Date Current Problem(s) Began --  Weeks  -      Brief Description of Current Complaint Pt reports that she has been experiencing pain in her right ankle for a few weeks with no recent VIMAL. She does report a hx of an old ankle fx that occurred around 1999. She is unsure why it has recently started to bother her again.  female living with her sister in a single story home with 4 steps to enter.  -      Previous treatment for THIS PROBLEM --  Nothing recently  -      Patient/Caregiver Goals Relieve pain;Return to prior level of function  -      Current Tobacco Use No  -      Smoking Status Former  -      Patient's Rating of General Health Excellent  -      Hand Dominance right-handed  -      Occupation/sports/leisure activities Unemployed; Hobbies: sewing, crafts, gardening  -      What clinical tests have you had for this problem? X-ray  -      Results of Clinical Tests Per imaging report of ankle x-ray on 2/22/23: “There is no confirmed acute fracture or dislocation. There are 2 adjacent well corticated ossifications along the inferior aspect of the medial malleolus measuring 7 x 5 mm and 2 x 2 mm which could be associated " "with old avulsion fragments or unfused accessory ossification centers. Ankle mortise is satisfactorily aligned. There are mild degenerative changes. On the lateral view there is a lucent focus along the articular surface of the anterior talus measuring 6 x 3 mm which could be associated with an osteochondral lesion/focus of osteochondritis. There is osteopenia. Plantar and posterior calcaneal enthesophytes are noted. Soft tissues are unremarkable.”  -         Pain     Pain Location Ankle  right  -      Pain at Present 8  -      Pain at Best 0  -      Pain at Worst 10  -      Pain Frequency Intermittent  -      Pain Description Aching;Sharp;Dull;Pressure  \"Feels like it is going to break\"  -      What Performance Factors Make the Current Problem(s) WORSE? walking, standing, squatting, stairs, pushing/pulling, lifting/picking up items, ankle IV, and driving (pushing on break and gas).  -      What Performance Factors Make the Current Problem(s) BETTER? ice, ankle brace  -      Tolerance Time- Standing 20 min  -      Tolerance Time- Walking 20 min  -      Is your sleep disturbed? No  -      Is medication used to assist with sleep? Yes  -      Difficulties at work? n/a  -      Difficulties with ADL's? Most IADLs  -      Difficulties with recreational activities? Limited due to ankle pain  -         Fall Risk Assessment    Any falls in the past year: Yes  -      Number of falls reported in the last 12 months 1  -            User Key  (r) = Recorded By, (t) = Taken By, (c) = Cosigned By    Initials Name Provider Type     Sara Loyola, MEHDI Physical Therapist                 PT Ortho     Row Name 03/08/23 1400       Subjective Comments    Subjective Comments See therapy pt hx  -       Precautions and Contraindications    Contraindications Per imaging report: osteopenia in ankle   -MH       Subjective Pain    Able to rate subjective pain? yes  -    Pre-Treatment Pain Level 5  -    " Post-Treatment Pain Level 5  -       Posture/Observations    Posture/Observations Comments Posture: bilat increased ankle/foot pronation. TTP: around post side of medial malleolus, lateral malleolus grossly, proximally along 5th ray, Achilles tendon and post heel. Mild edema ant later malleoli and top of 5th ray proximally. No ecchymosis.  -       Ankle/Foot Special Tests    Anterior drawer (ATFL lesion) Negative  -    Talar tilt test (instability) Negative  -    Inversion Stress Test Positive  -    Eversion Stress Test Negative  -       General ROM    RT Lower Ext Rt Ankle Dorsiflexion;Rt Knee Extension/Flexion;Rt Ankle Plantarflexion;Rt Ankle Inversion;Rt Ankle Eversion  -    LT Lower Ext Comment  -       Right Lower Ext    Rt Knee Extension/Flexion AROM WNL  -    Rt Ankle Dorsiflexion AROM 5 deg  -    Rt Ankle Dorsiflexion PROM 10 deg; tightness and pain  -    Rt Ankle Plantarflexion AROM 46 deg  -    Rt Ankle Plantarflexion PROM 52 deg  -    Rt Ankle Inversion AROM 30 deg; pain  -    Rt Ankle Eversion AROM 22 deg; pain  -    RT Lower Extremity Comments Hip, knee, and toes WFL grossly. Pt able to fully flex and ext all 5 toes.  -       Left Lower Ext    LT Lower Extremity Comments AROM WFL grossly  -       MMT (Manual Muscle Testing)    Rt Lower Ext Rt Knee Extension;Rt Knee Flexion;Rt Ankle Plantarflexion;Rt Ankle Dorsiflexion;Rt Ankle Subtalar Eversion;Rt Ankle Subtalar Inversion;Rt Hip Flexion;Rt Hip Extension;Rt Hip ABduction;Rt Hip ADduction;Rt Hip Internal (Medial) Rotation;Rt Hip External (Lateral) Rotation  -       MMT Right Lower Ext    Rt Hip Flexion MMT, Gross Movement (4/5) good  -    Rt Hip Extension MMT, Gross Movement (4/5) good  -    Rt Hip ABduction MMT, Gross Movement (5/5) normal  -    Rt Hip ADduction MMT, Gross Movement (5/5) normal  -    Rt Hip Internal (Medial) Rotation MMT, Gross Movement (5/5) normal  -    Rt Hip External (Lateral) Rotation  MMT, Gross Movement (5/5) normal  -    Rt Knee Extension MMT, Gross Movement (4/5) good  -MH    Rt Knee Flexion MMT, Gross Movement (4/5) good  -MH    Rt Ankle Plantarflexion MMT, Gross Movement (4/5) good  -MH    Rt Ankle Dorsiflexion MMT, Gross Movement (4/5) good  -MH    Rt Ankle Subtalar Inversion MT, Gross Movement (4/5) good  -    Rt Ankle Subtalar Eversion MMT, Gross Movement (4/5) good  -    Rt Lower Extremity Comments  pain with all ankle MMT  -MH       Sensation    Sensation WNL? WNL  -MH    Light Touch No apparent deficits  -       Flexibility    Flexibility Tested? Lower Extremity  -MH       Lower Extremity Flexibility    Gastrocnemius Mildly limited  -    Soleus Mildly limited  -       Balance Skills Training    SLS unable bilat  -    Sharpened [place] 5-10 sec bilat  -       Transfers    Comment, (Transfers) indpt with all transfers  -       Gait/Stairs (Locomotion)    [place] Level (Gait) independent  -    Comment, (Gait/Stairs) Mild antalgic gait  -          User Key  (r) = Recorded By, (t) = Taken By, (c) = Cosigned By    Initials Name Provider Type     Sara Loyola, PT Physical Therapist                            Therapy Education  Education Details: HEP: ankle AROM 4-way, ankle circles and ABCs, towel scrunches, seated HR/TR  Given: HEP  Program: New  How Provided: Verbal, Demonstration, Written  Provided to: Patient  Level of Understanding: Teach back education performed, Verbalized      PT OP Goals     Row Name 03/08/23 1400          PT Short Term Goals    STG Date to Achieve 04/05/23  -     STG 1 Pt’s ankle DF AROM will improve to 10 deg.  -     STG 1 Progress New  -     STG 2 Pt’s ankle MMT will improve to 5/5 in all planes as a measure of improved strength.  -     STG 2 Progress New  [facility]     STG 3 Pt will be able to  SLS on the involved limb for at least 10 seconds as a measure of improved balance.  -     STG 3 Progress New  -     STG 4 Pt  will be able to  tandem stance with the involved limb post for at least 30 seconds as a measure of improved balance.  -     STG 4 Progress New  -        Time Calculation    PT Goal Re-Cert Due Date 03/29/23  -           User Key  (r) = Recorded By, (t) = Taken By, (c) = Cosigned By    Initials Name Provider Type     Sara Loyola, PT Physical Therapist                 PT Assessment/Plan     Row Name 03/08/23 1400          PT Assessment    Functional Limitations Impaired gait;Impaired locomotion;Limitation in home management;Performance in leisure activities  -     Impairments Balance;Edema;Gait;Impaired flexibility;Impaired muscle endurance;Impaired muscle length;Impaired muscle power;Joint mobility;Muscle strength;Pain;Posture;Range of motion  -     Assessment Comments Ms. Montgomery is a 56 y/o female who presents to physical therapy with chronic R ankle pain. No recent VIMAL that pt is aware of. Pt does report that she had an ankle fx in 1999. She reports currently experiencing pain/difficulty with walking, standing, squatting, stairs, pushing/pulling, lifting/picking up items, ankle IV, and driving (pushing on break and gas). Upon evaluation, she presents with pain, TTP, edema, decreased ROM, LE weakness, decreased balance, and gait deviations. Ms. oMntgomery would benefit from skilled physical therapy to address the above deficits in order for her to return to her prior level of function.  -     Rehab Potential Fair  Barrier: chronicity  -     Patient/caregiver participated in establishment of treatment plan and goals Yes  -     Patient would benefit from skilled therapy intervention Yes  -        PT Plan    PT Frequency 2x/week  -     Predicted Duration of Therapy Intervention (PT) 4 weeks  -     Planned CPT's? PT EVAL MOD COMPLELITY: 05838;PT RE-EVAL: 36889;PT THER PROC EA 15 MIN: 85881;PT MANUAL THERAPY EA 15 MIN: 74407;PT NEUROMUSC RE-EDUCATION EA 15 MIN: 54494;PT THER ACT EA 15 MIN:  36028;PT GAIT TRAINING EA 15 MIN: 77342;PT SELF CARE/HOME MGMT/TRAIN EA 15: 06028;PT ELECTRICAL STIM UNATTEND: ;PT ULTRASOUND EA 15 MIN: 16268;PT HOT/COLD PACK WC NONMCARE: 18888;PT SELF CARE/MGMT/TRAIN 15 MIN: 30029;PT THER SUPP EA 15 MIN  -     PT Plan Comments Ankle ROM, LE strength, ankle stability, balance, stretching, gait, modalities and manual as needed.  -           User Key  (r) = Recorded By, (t) = Taken By, (c) = Cosigned By    Initials Name Provider Type     Sara Loyola PT Physical Therapist                   OP Exercises     Row Name 03/08/23 1400             Subjective Comments    Subjective Comments See therapy pt hx  -         Subjective Pain    Able to rate subjective pain? yes  -      Pre-Treatment Pain Level 5  -      Post-Treatment Pain Level 5  -         Exercise 1    Exercise Name 1 HEP: ankle AROM 4-way, ankle circles and ABCs, towel scrunches, seated HR/TR  -            User Key  (r) = Recorded By, (t) = Taken By, (c) = Cosigned By    Initials Name Provider Type    Sara Millan, PT Physical Therapist                              Outcome Measure Options: Lower Extremity Functional Scale (LEFS)  Lower Extremity Functional Index  Any of your usual work, housework or school activities: Quite a bit of difficulty  Your usual hobbies, recreational or sporting activities: Extreme difficulty or unable to perform activity  Getting into or out of the bath: Quite a bit of difficulty  Walking between rooms: Quite a bit of difficulty  Putting on your shoes or socks: No difficulty  Squatting: A little bit of difficulty  Lifting an object, like a bag of groceries from the floor: Quite a bit of difficulty  Performing light activities around your home: Moderate difficulty  Performing heavy activities around your home: Extreme difficulty or unable to perform activity  Getting into or out of a car: Moderate difficulty  Walking 2 blocks: Quite a bit of difficulty  Walking a mile:  Quite a bit of difficulty  Going up or down 10 stairs (about 1 flight of stairs): Extreme difficulty or unable to perform activity  Standing for 1 hour: Quite a bit of difficulty  Sitting for 1 hour: No difficulty  Running on even ground: Extreme difficulty or unable to perform activity  Running on uneven ground: Extreme difficulty or unable to perform activity  Making sharp turns while running fast: Extreme difficulty or unable to perform activity  Hopping: Extreme difficulty or unable to perform activity  Rolling over in bed: Moderate difficulty  Total: 24      Time Calculation:     Start Time: 1430  Stop Time: 1510  Time Calculation (min): 40 min     Therapy Charges for Today     Code Description Service Date Service Provider Modifiers Qty    75334349946 HC PT EVAL MOD COMPLEXITY 3 3/8/2023 Sara Loyola, PT GP 1          PT G-Codes  Outcome Measure Options: Lower Extremity Functional Scale (LEFS)  Total: 24         Sara Loyola PT, DPT  3/8/2023

## 2023-03-10 DIAGNOSIS — M54.2 CHRONIC CERVICAL PAIN: ICD-10-CM

## 2023-03-10 DIAGNOSIS — J30.2 SEASONAL ALLERGIES: ICD-10-CM

## 2023-03-10 DIAGNOSIS — G89.29 CHRONIC CERVICAL PAIN: ICD-10-CM

## 2023-03-10 DIAGNOSIS — M54.12 CERVICAL RADICULOPATHY: ICD-10-CM

## 2023-03-10 RX ORDER — CELECOXIB 100 MG/1
100 CAPSULE ORAL 2 TIMES DAILY
Qty: 60 CAPSULE | Refills: 1 | Status: SHIPPED | OUTPATIENT
Start: 2023-03-10

## 2023-03-10 RX ORDER — FEXOFENADINE HYDROCHLORIDE 180 MG/1
TABLET ORAL
Qty: 30 TABLET | Refills: 1 | Status: SHIPPED | OUTPATIENT
Start: 2023-03-10

## 2023-03-15 ENCOUNTER — HOSPITAL ENCOUNTER (OUTPATIENT)
Dept: PHYSICAL THERAPY | Facility: HOSPITAL | Age: 58
Setting detail: THERAPIES SERIES
Discharge: HOME | End: 2023-03-15
Payer: COMMERCIAL

## 2023-03-15 DIAGNOSIS — M25.571 CHRONIC PAIN OF RIGHT ANKLE: Primary | ICD-10-CM

## 2023-03-15 DIAGNOSIS — G89.29 CHRONIC PAIN OF RIGHT ANKLE: Primary | ICD-10-CM

## 2023-03-15 PROCEDURE — 97140 MANUAL THERAPY 1/> REGIONS: CPT

## 2023-03-15 PROCEDURE — 97110 THERAPEUTIC EXERCISES: CPT

## 2023-03-15 NOTE — THERAPY TREATMENT NOTE
Outpatient Physical Therapy Ortho Treatment Note  [facility]     Patient Name: Natividad Montgomery  : 1965  MRN: [id number]  Today's Date: 3/15/2023      Visit Date: 03/15/2023  Subjective Improvement: n/a  MD visit: 3/27/2023  Visit Number: 3/8/23  Total Approved:TBD  Recert Date: 3/29/2023  Visit Dx:    ICD-10-CM ICD-9-CM   1. Chronic pain of right ankle  M25.571 719.47    G89.29 338.29       Patient Active Problem List   Diagnosis   • Chronic pain of right knee   • Cyst of right knee joint   • Depression   • Patellofemoral arthritis of right knee   • GERD (gastroesophageal reflux disease)   • Acute pain of right shoulder        Past Medical History:   Diagnosis Date   • Allergic    • Anxiety    • Arthritis    • Breast cyst    • Bronchitis    • Colon polyp    • Depression    • DVT (deep venous thrombosis) ([facility])    • Fibrocystic breast    • GERD (gastroesophageal reflux disease)    • Hard to intubate    • Headache    • Strep throat    • Urinary tract infection         Past Surgical History:   Procedure Laterality Date   •  SECTION     • COLONOSCOPY     • EXCISION MASS LEG Right 2018    Procedure: EXCISION MASS RIGHT KNEE                             (LATEX ALLERGY);  Surgeon: Jose David Desai MD;  Location: [facility];  Service: Orthopedics   • SHOULDER SURGERY      right shoulder   • SUBTOTAL HYSTERECTOMY     • TONSILLECTOMY     • VAGINAL DILATATION      age 15        PT Ortho     Row Name 03/15/23 1400       Subjective Comments    Subjective Comments Pt reports not hurting like it was.  -TL       Precautions and Contraindications    Contraindications Per imaging report: osteopenia in ankle  -TL       Subjective Pain    Able to rate subjective pain? yes  -TL    Pre-Treatment Pain Level 6  -TL          User Key  (r) = Recorded By, (t) = Taken By, (c) = Cosigned By    Initials Name Provider Type    TL Frannie Cortez PTA Physical Therapist Assistant                              PT Assessment/Plan     Row Name 03/15/23 1400          PT Assessment    Assessment Comments pt reports not change in pain after treatment. pt reports soreness with standing ex. pt reports doing HEP. PTA spoke with therapist to assess for orthotics.  No new goals met.  -TL        PT Plan    PT Frequency 2x/week  -TL     Predicted Duration of Therapy Intervention (PT) 4 weeks  -TL     PT Plan Comments Therapist going to assess pt feet for possible orthotics.  -TL           User Key  (r) = Recorded By, (t) = Taken By, (c) = Cosigned By    Initials Name Provider Type    Frannie Mazariegos PTA Physical Therapist Assistant                   OP Exercises     Row Name 03/15/23 1500 03/15/23 1400          Subjective Comments    Subjective Comments -- Pt reports not hurting like it was.  -TL        Subjective Pain    Able to rate subjective pain? -- yes  -TL     Pre-Treatment Pain Level -- 6  -TL     Post-Treatment Pain Level -- 6  -TL        Total Minutes    92571 - PT Manual Therapy Minutes 8  -TL --        Exercise 1    Exercise Name 1 -- towel S  -TL     Sets 1 -- 3  -TL     Time 1 -- 30 sec hold  -TL        Exercise 2    Exercise Name 2 -- seatd HR/TR  -TL     Reps 2 -- 20 each  -TL        Exercise 3    Exercise Name 3 -- seated INV/EV towel slides  -TL     Reps 3 -- 20  -TL        Exercise 4    Exercise Name 4 -- arch roller  -TL     Time 4 -- 3 mins  -TL        Exercise 5    Exercise Name 5 -- st heelraises/toe raises  -TL     Time 5 -- 20  -TL        Exercise 6    Exercise Name 6 -- scrutches toe  -TL     Time 6 -- 3min  -TL        Exercise 7    Exercise Name 7 -- big toe lift  -TL     Reps 7 -- 20  -TL        Exercise 8    Exercise Name 8 -- hc stretch  -TL     Reps 8 -- 3  -TL     Time 8 -- 30 sec hold  -TL        Exercise 9    Exercise Name 9 -- see manual  -TL           User Key  (r) = Recorded By, (t) = Taken By, (c) = Cosigned By    Initials Name Provider Type    Frannie Mazariegos PTA  Physical Therapist Assistant                         Manual Rx (last 36 hours)     Manual Treatments     Row Name 03/15/23 1500             Total Minutes    85009 - PT Manual Therapy Minutes 8  -TL         Manual Rx 1    Manual Rx 1 Location manual big toe S  -TL         Manual Rx 2    Manual Rx 2 Location dorsal foot  -TL      Manual Rx 2 Grade boarding/ opposing thumbs  -TL            User Key  (r) = Recorded By, (t) = Taken By, (c) = Cosigned By    Initials Name Provider Type    Frannie Mazariegos PTA Physical Therapist Assistant                 PT OP Goals     Row Name 03/15/23 1400          PT Short Term Goals    STG Date to Achieve 04/05/23  -TL     STG 1 Pt’s ankle DF AROM will improve to 10 deg.  -TL     STG 1 Progress New  -TL     STG 2 Pt’s ankle MMT will improve to 5/5 in all planes as a measure of improved strength.  -TL     STG 2 Progress New  -TL     STG 3 Pt will be able to  SLS on the involved limb for at least 10 seconds as a measure of improved balance.  -TL     STG 3 Progress New  -TL     STG 4 Pt will be able to  tandem stance with the involved limb post for at least 30 seconds as a measure of improved balance.  -TL     STG 4 Progress New  -TL        Time Calculation    PT Goal Re-Cert Due Date 03/09/23  -TL           User Key  (r) = Recorded By, (t) = Taken By, (c) = Cosigned By    Initials Name Provider Type    Frannie Mazariegos PTA Physical Therapist Assistant                Therapy Education  Education Details: arch roller, standing HR/TR  Given: HEP, Pain management  Program: New, Reinforced  How Provided: Verbal, Demonstration, Written  Provided to: Patient  Level of Understanding: Teach back education performed, Verbalized, Demonstrated              Time Calculation:   Start Time: 1430  Stop Time: 1516  Time Calculation (min): 46 min  Timed Charges  17955 - PT Manual Therapy Minutes: 8  Total Minutes  Timed Charges Total Minutes: 8   Total Minutes: 8  Therapy Charges  for Today     Code Description Service Date Service Provider Modifiers Qty    78548310577 HC PT MANUAL THERAPY EA 15 MIN 3/15/2023 Frannie Cortez, PTA GP, CQ 1    59891351288 HC PT THER PROC EA 15 MIN 3/15/2023 Frannie Cortez, MAVIS GP, CQ 2                    Frannie Cortez PTA  3/15/2023

## 2023-03-17 ENCOUNTER — APPOINTMENT (OUTPATIENT)
Dept: PHYSICAL THERAPY | Facility: HOSPITAL | Age: 58
End: 2023-03-17
Payer: COMMERCIAL

## 2023-03-20 ENCOUNTER — TELEPHONE (OUTPATIENT)
Dept: PHYSICAL THERAPY | Facility: HOSPITAL | Age: 58
End: 2023-03-20
Payer: COMMERCIAL

## 2023-03-22 ENCOUNTER — APPOINTMENT (OUTPATIENT)
Dept: PHYSICAL THERAPY | Facility: HOSPITAL | Age: 58
End: 2023-03-22
Payer: COMMERCIAL

## 2023-03-27 ENCOUNTER — OFFICE VISIT (OUTPATIENT)
Dept: ORTHOPEDIC SURGERY | Facility: CLINIC | Age: 58
End: 2023-03-27
Payer: COMMERCIAL

## 2023-03-27 ENCOUNTER — HOSPITAL ENCOUNTER (OUTPATIENT)
Dept: PHYSICAL THERAPY | Facility: HOSPITAL | Age: 58
Setting detail: THERAPIES SERIES
Discharge: HOME | End: 2023-03-27
Payer: COMMERCIAL

## 2023-03-27 VITALS — HEIGHT: 64 IN | BODY MASS INDEX: 29.43 KG/M2 | WEIGHT: 172.4 LBS

## 2023-03-27 DIAGNOSIS — G89.29 CHRONIC PAIN IN RIGHT SHOULDER: ICD-10-CM

## 2023-03-27 DIAGNOSIS — Z13.820 OSTEOPOROSIS SCREENING: ICD-10-CM

## 2023-03-27 DIAGNOSIS — G89.29 CHRONIC PAIN OF RIGHT ANKLE: Primary | ICD-10-CM

## 2023-03-27 DIAGNOSIS — M25.511 CHRONIC PAIN IN RIGHT SHOULDER: ICD-10-CM

## 2023-03-27 DIAGNOSIS — M25.571 CHRONIC PAIN OF RIGHT ANKLE: Primary | ICD-10-CM

## 2023-03-27 DIAGNOSIS — M24.811 INTERNAL DERANGEMENT OF RIGHT SHOULDER: ICD-10-CM

## 2023-03-27 PROCEDURE — 99213 OFFICE O/P EST LOW 20 MIN: CPT | Performed by: NURSE PRACTITIONER

## 2023-03-27 PROCEDURE — 1159F MED LIST DOCD IN RCRD: CPT | Performed by: NURSE PRACTITIONER

## 2023-03-27 PROCEDURE — 1160F RVW MEDS BY RX/DR IN RCRD: CPT | Performed by: NURSE PRACTITIONER

## 2023-03-27 PROCEDURE — 97110 THERAPEUTIC EXERCISES: CPT

## 2023-03-27 NOTE — THERAPY TREATMENT NOTE
Outpatient Physical Therapy Ortho Treatment Note  [facility]     Patient Name: Natividad Montgomery  : 1965  MRN: [id number]  Today's Date: 3/27/2023      Visit Date: 2023   Attendance: 3/3   Subjective improvement: 85%  Recert: 3/29/23  MD Appointment: 3/27/23      Visit Dx:    ICD-10-CM ICD-9-CM   1. Chronic pain of right ankle  M25.571 719.47    G89.29 338.29   2. Chronic pain in right shoulder  M25.511 719.41    G89.29 338.29   3. Internal derangement of right shoulder  M24.811 719.81       Patient Active Problem List   Diagnosis   • Chronic pain of right knee   • Cyst of right knee joint   • Depression   • Patellofemoral arthritis of right knee   • GERD (gastroesophageal reflux disease)   • Acute pain of right shoulder        Past Medical History:   Diagnosis Date   • Allergic    • Anxiety    • Arthritis    • Breast cyst    • Bronchitis    • Colon polyp    • Depression    • DVT (deep venous thrombosis) ([facility])    • Fibrocystic breast    • GERD (gastroesophageal reflux disease)    • Hard to intubate    • Headache    • Strep throat    • Urinary tract infection         Past Surgical History:   Procedure Laterality Date   •  SECTION     • COLONOSCOPY     • EXCISION MASS LEG Right 2018    Procedure: EXCISION MASS RIGHT KNEE                             (LATEX ALLERGY);  Surgeon: Jose David Desai MD;  Location: [facility];  Service: Orthopedics   • SHOULDER SURGERY      right shoulder   • SUBTOTAL HYSTERECTOMY     • TONSILLECTOMY     • VAGINAL DILATATION      age 15        PT Ortho     Row Name 23 1218       Posture/Observations    Posture/Observations Comments bilateral ankle pronation and ankle valgus. Skin in tact. Sensation in tact. Tenderfoot medium to be fabricated full length. Wear schedule education performed  -BB          User Key  (r) = Recorded By, (t) = Taken By, (c) = Cosigned By    Initials Name Provider Type    BB Sophie Liu PT DPT Physical  "Therapist                             PT Assessment/Plan     Row Name 03/27/23 1400          PT Assessment    Assessment Comments BB scanned feet for custom orthotics. Pt nallely tx well today with no increase in pain. Pt reports 85% improvement thus far  -EM        PT Plan    PT Frequency 2x/week  -EM     Predicted Duration of Therapy Intervention (PT) 4 weeks  -EM     PT Plan Comments Cont ankle strengthening and balance activities.  -EM           User Key  (r) = Recorded By, (t) = Taken By, (c) = Cosigned By    Initials Name Provider Type    EM Levy Durbin, PTA Physical Therapist Assistant                   OP Exercises     Row Name 03/27/23 1200             Subjective Comments    Subjective Comments Pt reports she is doing much better. Pt feels her pain was contributed to her shoes she was wearing at the time  -EM         Subjective Pain    Able to rate subjective pain? yes  -EM      Pre-Treatment Pain Level 0  -EM      Post-Treatment Pain Level 0  -EM         Exercise 1    Exercise Name 1 Incline Calf S  -EM      Sets 1 3  -EM      Time 1 30\"  -EM         Exercise 2    Exercise Name 2 St CR  -EM      Sets 2 1  -EM      Reps 2 20  -EM         Exercise 3    Exercise Name 3 St TR  -EM      Sets 3 1  -EM      Reps 3 20  -EM         Exercise 4    Exercise Name 4 St CR: IR, ER  -EM      Sets 4 1  -EM      Reps 4 20  -EM         Exercise 5    Exercise Name 5 --  -EM      Sets 5 --  -EM      Reps 5 --  -EM      Additional Comments --  -EM         Exercise 6    Exercise Name 6 4 Way Ankle TB  -EM      Sets 6 1  -EM      Reps 6 20  -EM      Additional Comments GTB  -EM            User Key  (r) = Recorded By, (t) = Taken By, (c) = Cosigned By    Initials Name Provider Type    EM Levy Durbin PTA Physical Therapist Assistant                              PT OP Goals     Row Name 03/27/23 1400          PT Short Term Goals    STG Date to Achieve 04/05/23  -EM     STG 1 Pt’s ankle DF AROM will improve to 10 deg.  -EM     STG " 1 Progress Not Met  -EM     STG 2 Pt’s ankle MMT will improve to 5/5 in all planes as a measure of improved strength.  -EM     STG 2 Progress Not Met  -EM     STG 3 Pt will be able to  SLS on the involved limb for at least 10 seconds as a measure of improved balance.  -EM     STG 3 Progress Not Met  -EM     STG 4 Pt will be able to  tandem stance with the involved limb post for at least 30 seconds as a measure of improved balance.  -EM     STG 4 Progress Not Met  -EM        Time Calculation    PT Goal Re-Cert Due Date 03/09/23  -EM           User Key  (r) = Recorded By, (t) = Taken By, (c) = Cosigned By    Initials Name Provider Type    EM Levy Durbin PTA Physical Therapist Assistant                               Time Calculation:   Start Time: 1200  Stop Time: 1231  Time Calculation (min): 31 min  Total Timed Code Minutes- PT: 31 minute(s)  Therapy Charges for Today     Code Description Service Date Service Provider Modifiers Qty    23796804860 HC PT THER PROC EA 15 MIN 3/27/2023 Levy Durbin PTA GP, CQ 1    51300945265 HC PT THER SUPP EA 15 MIN 3/27/2023 Levy Durbin PTA GP 1                    Levy Durbin PTA  3/27/2023

## 2023-03-27 NOTE — PROGRESS NOTES
"Natividad Montgomery is a 57 y.o. female returns for     Chief Complaint   Patient presents with   • Right Ankle - Follow-up       HISTORY OF PRESENT ILLNESS:      Mrs. Montgomery is a 57-year-old female who presents today to follow-up on right chronic ankle pain.  She reports history of fracture years ago.  No recent injury.  She has been treated with ankle brace and physical therapy.  She also takes Celebrex.  She reports she has discontinued use of ankle brace.  She reports pain has gotten somewhat better after using supportive shoe.  She reports her next PT visit is later this afternoon, she states she is being evaluated for custom shoe inserts.  She rates her pain a 0/10.     CONCURRENT MEDICAL HISTORY:    The following portions of the patient's history were reviewed and updated as appropriate: allergies, current medications, past family history, past medical history, past social history, past surgical history and problem list.     ROS  No fevers or chills.  No chest pain or shortness of air.  No GI or  disturbances.    PHYSICAL EXAMINATION:       Ht 162.6 cm (64\")   Wt 78.2 kg (172 lb 6.4 oz)   LMP 01/01/1999 (Approximate)   BMI 29.59 kg/m²     Physical Exam  Vitals and nursing note reviewed.   Constitutional:       General: She is not in acute distress.     Appearance: She is well-developed. She is not toxic-appearing or diaphoretic.   HENT:      Head: Normocephalic.   Eyes:      General: No scleral icterus.  Pulmonary:      Effort: Pulmonary effort is normal. No respiratory distress.   Skin:     General: Skin is warm and dry.   Neurological:      Mental Status: She is alert and oriented to person, place, and time.   Psychiatric:         Behavior: Behavior normal.         Thought Content: Thought content normal.         Judgment: Judgment normal.         GAIT:     [x]  Normal  []  Antalgic    Assistive device: [x]  None  []  Walker     []  Crutches  []  Cane     []  Wheelchair  []  Stretcher    Right Ankle " Exam     Range of Motion   The patient has normal right ankle ROM.    Comments:  No pain with range of motion.                  XR Ankle 3+ View Right    Result Date: 3/1/2023  Narrative: CLINICAL HISTORY: pain, M25.571 Pain in right ankle and joints of right foot G89.29 Other chronic pain XR ANKLE 3+ VW RIGHT COMPARISON: No comparison FINDINGS: AP, lateral, and oblique views of the right ankle were obtained. There is no confirmed acute fracture or dislocation. There are 2 adjacent well corticated ossifications along the inferior aspect of the medial malleolus measuring 7 x 5 mm and 2 x 2 mm which could be associated with old avulsion fragments or unfused accessory ossification centers. Ankle mortise is satisfactorily aligned. There are mild degenerative changes. On the lateral view there is a lucent focus along the articular surface of the anterior talus measuring 6 x 3 mm which could be associated with an osteochondral lesion/focus of osteochondritis. There is osteopenia. Plantar and posterior calcaneal enthesophytes are noted. Soft tissues are unremarkable.     Impression: *  No confirmed acute fracture or dislocation. *  Mild degenerative changes. *  6 x 3 mm lucent focus along the articular surface of the anterior talus which could be associated with an osteochondral lesion/focus of osteochondritis. *  Small well-corticated ossifications inferior to the medial malleolus as stated above. *  MRI follow-up evaluation could be considered. *  Osteopenia. *  Plantar and posterior calcaneal enthesophytes. Electronically signed by:  Houston Aiken DO  3/1/2023 3:57 PM CST Workstation: [id number]            ASSESSMENT:    Diagnoses and all orders for this visit:    Chronic pain of right ankle    Osteoporosis screening  -     dexa bone density axial; Future  -     Amb Referral to [facility]  [facility]          PLAN    Patient reports improvement of previous symptoms.  Recommend patient continue with evaluation  by PT for custom shoe inserts.  Patient to continue RICE therapy and OTC medications as needed.  Signs and symptoms to report including when to seek care explained.  No further follow-up for ankle required at this time.        Patient does report possible loss in height.  Patient has a brother who fell and broke her hip from a fall from a standing height.  Patient is 57.  Recommend proceeding with DEXA scan to assess bone health.  Patient is agreeable with like to proceed with osteoporosis screening.    Patient is to return for DEXA scan results.    EMR Dragon/Transciption Disclaimer: Some of this note may be an electronic transcription/translation of spoken language to printed text.  The electronic translation of spoken language may permit erroneous, or at times, nonsensical words or phrases to be inadvertently transcribed. Although I have reviewed the note for such errors, some may still exist.       This document has been electronically signed by Kira BARROSO on March 27, 2023 10:23 CDT

## 2023-03-29 ENCOUNTER — TELEPHONE (OUTPATIENT)
Dept: PHYSICAL THERAPY | Facility: HOSPITAL | Age: 58
End: 2023-03-29
Payer: COMMERCIAL

## 2023-03-29 ENCOUNTER — OFFICE VISIT (OUTPATIENT)
Dept: FAMILY MEDICINE CLINIC | Facility: CLINIC | Age: 58
End: 2023-03-29
Payer: COMMERCIAL

## 2023-03-29 VITALS
HEIGHT: 64 IN | OXYGEN SATURATION: 97 % | HEART RATE: 78 BPM | BODY MASS INDEX: 29.37 KG/M2 | DIASTOLIC BLOOD PRESSURE: 64 MMHG | WEIGHT: 172 LBS | SYSTOLIC BLOOD PRESSURE: 104 MMHG

## 2023-03-29 DIAGNOSIS — G89.29 CHRONIC CERVICAL PAIN: ICD-10-CM

## 2023-03-29 DIAGNOSIS — K64.9 HEMORRHOIDS, UNSPECIFIED HEMORRHOID TYPE: ICD-10-CM

## 2023-03-29 DIAGNOSIS — K21.9 GASTROESOPHAGEAL REFLUX DISEASE WITHOUT ESOPHAGITIS: ICD-10-CM

## 2023-03-29 DIAGNOSIS — M54.2 CHRONIC CERVICAL PAIN: ICD-10-CM

## 2023-03-29 DIAGNOSIS — F41.9 ANXIETY: ICD-10-CM

## 2023-03-29 DIAGNOSIS — F31.9 BIPOLAR DEPRESSION: Primary | ICD-10-CM

## 2023-03-29 DIAGNOSIS — Z12.11 SCREEN FOR COLON CANCER: ICD-10-CM

## 2023-03-29 PROCEDURE — 1159F MED LIST DOCD IN RCRD: CPT | Performed by: NURSE PRACTITIONER

## 2023-03-29 PROCEDURE — 1160F RVW MEDS BY RX/DR IN RCRD: CPT | Performed by: NURSE PRACTITIONER

## 2023-03-29 PROCEDURE — 99213 OFFICE O/P EST LOW 20 MIN: CPT | Performed by: NURSE PRACTITIONER

## 2023-03-29 RX ORDER — HYDROCORTISONE ACETATE 25 MG/1
25 SUPPOSITORY RECTAL 2 TIMES DAILY PRN
Qty: 60 SUPPOSITORY | Refills: 1 | Status: SHIPPED | OUTPATIENT
Start: 2023-03-29

## 2023-03-29 RX ORDER — HYDROXYZINE HYDROCHLORIDE 10 MG/1
10 TABLET, FILM COATED ORAL EVERY 8 HOURS PRN
Qty: 90 TABLET | Refills: 3 | Status: SHIPPED | OUTPATIENT
Start: 2023-03-29

## 2023-03-29 NOTE — PROGRESS NOTES
"Chief Complaint  Manic Behavior (Follow up )    Subjective   Three month follow-up for Depression, GERD and chronic neck pain.  \"I lost my mother two weeks ago but I am at peace with it,  She is in heaven with father again.\"  Continues to complain of hemorrhoidal pain.  Has used ProctoCream in the past with little to no relief.        Natividad Montgomery presents to [facility] [place]  Depression  Visit Type: follow-up  Patient presents with the following symptoms: decreased concentration, depressed mood, excessive worry and nervousness/anxiety.  Frequency of symptoms: occasionally   Severity: mild   Sleep quality: good  Nighttime awakenings: occasional  Compliance with medications:  %        Heartburn  She complains of heartburn. This is a chronic problem. The current episode started more than 1 year ago. The problem occurs rarely. The heartburn duration is several minutes. The heartburn is located in the substernum. The heartburn does not wake her from sleep. The heartburn does not limit her activity. The heartburn doesn't change with position. The symptoms are aggravated by certain foods and stress. Risk factors include obesity, NSAIDs, lack of exercise and caffeine use. She has tried a PPI for the symptoms. The treatment provided significant relief.   Neck Pain   This is a chronic problem. The current episode started more than 1 year ago. The problem occurs constantly. The problem has been waxing and waning. The pain is associated with nothing. The pain is present in the midline. The pain is moderate. The symptoms are aggravated by twisting. She has tried acetaminophen for the symptoms. The treatment provided mild relief.   Anxiety  Presents for follow-up visit. The problem has been gradually improving. Symptoms include decreased concentration, depressed mood, excessive worry and nervous/anxious behavior. Symptoms occur occasionally. The severity of symptoms " "is mild. The quality of sleep is good. Nighttime awakenings: occasional.     Her past medical history is significant for depression. Compliance with medications is %.   Hemorrhoids  This is a chronic problem. The current episode started more than 1 year ago. The problem occurs every several days. The problem has been waxing and waning. Associated symptoms include neck pain. Exacerbated by: Bowel Movements. Treatments tried: Procto-Med cream. The treatment provided mild relief.     Current Outpatient Medications on File Prior to Visit   Medication Sig Dispense Refill   • 24HR Allergy Relief 180 MG tablet TAKE 1 TABLET BY MOUTH DAILY. 30 tablet 1   • celecoxib (CeleBREX) 100 MG capsule TAKE 1 CAPSULE BY MOUTH 2 (TWO) TIMES A DAY. 60 capsule 1   • Cholecalciferol (Vitamin D3) 1.25 MG (03602 UT) capsule TAKE ONE CAPSULE BY MOUTH ONCE A WEEK 4 capsule 48   • Docusate Sodium (COLACE PO) Take  by mouth Daily.     • fluticasone (FLONASE) 50 MCG/ACT nasal spray      • metoprolol succinate XL (TOPROL-XL) 25 MG 24 hr tablet Take 1 tablet by mouth Daily. 90 tablet 3   • omega-3 acid ethyl esters (LOVAZA) 1 g capsule TAKE 1 CAPSULE BY MOUTH DAILY WITH BREAKFAST. 30 capsule 2   • QUEtiapine (SEROquel) 50 MG tablet TAKE 1 TABLET BY MOUTH EVERY NIGHT. 30 tablet 3   • [DISCONTINUED] hydrOXYzine (ATARAX) 10 MG tablet Take 1 tablet by mouth Every 8 (Eight) Hours As Needed for Anxiety. 60 tablet 1   • [DISCONTINUED] Procto-Med HC 2.5 % rectal cream INSERT INTO THE RECTUM 2 (TWO) TIMES A DAY. 28 g 1     No current facility-administered medications on file prior to visit.     Allergies   Allergen Reactions   • Latex Itching   • Sulfa Antibiotics Other (See Comments)     \"Run a high fever\"       Objective   Vital Signs:  /64   Pulse 78   Ht 162.6 cm (64\")   Wt 78 kg (172 lb)   SpO2 97%   BMI 29.52 kg/m²   Estimated body mass index is 29.52 kg/m² as calculated from the following:    Height as of this encounter: 162.6 cm " "(64\").    Weight as of this encounter: 78 kg (172 lb).         Physical Exam  Vitals and nursing note reviewed.   Constitutional:       Appearance: Normal appearance. She is well-developed. She is obese.   HENT:      Head: Normocephalic.   Eyes:      Pupils: Pupils are equal, round, and reactive to light.   Cardiovascular:      Rate and Rhythm: Normal rate and regular rhythm.      Heart sounds: Normal heart sounds.   Pulmonary:      Effort: Pulmonary effort is normal.      Breath sounds: Normal breath sounds.   Abdominal:      General: Bowel sounds are normal.      Palpations: Abdomen is soft.   Musculoskeletal:         General: Normal range of motion.      Cervical back: Normal range of motion and neck supple.   Skin:     General: Skin is warm.      Capillary Refill: Capillary refill takes less than 2 seconds.   Neurological:      Mental Status: She is alert and oriented to person, place, and time.   Psychiatric:         Behavior: Behavior normal.        Result Review :                   Assessment and Plan   Diagnoses and all orders for this visit:    1. Bipolar depression (HCC) (Primary)    2. Anxiety  -     hydrOXYzine (ATARAX) 10 MG tablet; Take 1 tablet by mouth Every 8 (Eight) Hours As Needed for Anxiety.  Dispense: 90 tablet; Refill: 3    3. Gastroesophageal reflux disease without esophagitis    4. Chronic cervical pain    5. Hemorrhoids, unspecified hemorrhoid type  -     hydrocortisone (ANUSOL-HC) 25 MG suppository; Insert 1 suppository into the rectum 2 (Two) Times a Day As Needed for Hemorrhoids.  Dispense: 60 suppository; Refill: 1    6. Screen for colon cancer  -     Ambulatory Referral For Screening Colonoscopy      1.  Bipolar depression:  Well-controlled  Continue Seroquel as previously prescribed    2.  Anxiety:  Well-controlled  Continue Atarax as previously prescribed and refill prescription sent to pharmacy    3.  Gastroesophageal reflux disease without esophagitis:  Controlled  May continue use " of OTC antacids such as Tums or Rolaids according to package directions    4.  Chronic cervical pain:  Continue Celebrex as previously prescribed    5.  Hemorrhoids, unspecified hemorrhoid type:  Begin Anusol suppositories and new prescription sent to pharmacy  Increase water intake to help promote hydration elimination  Continue Colace as previously prescribed    6.  Screening for colon cancer:  Referral for screening colonoscopy placed and will contact her to schedule appointment       Follow Up   Return for Next scheduled follow up.  Patient was given instructions and counseling regarding her condition or for health maintenance advice. Please see specific information pulled into the AVS if appropriate.         This document has been electronically signed by DHARMESH Garza on March 29, 2023 13:33 CDT

## 2023-04-03 ENCOUNTER — APPOINTMENT (OUTPATIENT)
Dept: PHYSICAL THERAPY | Facility: HOSPITAL | Age: 58
End: 2023-04-03
Payer: COMMERCIAL

## 2023-04-05 ENCOUNTER — APPOINTMENT (OUTPATIENT)
Dept: PHYSICAL THERAPY | Facility: HOSPITAL | Age: 58
End: 2023-04-05
Payer: COMMERCIAL

## 2023-04-07 ENCOUNTER — LAB (OUTPATIENT)
Dept: LAB | Facility: HOSPITAL | Age: 58
End: 2023-04-07
Payer: COMMERCIAL

## 2023-04-07 ENCOUNTER — OFFICE VISIT (OUTPATIENT)
Dept: FAMILY MEDICINE CLINIC | Facility: CLINIC | Age: 58
End: 2023-04-07
Payer: COMMERCIAL

## 2023-04-07 VITALS
SYSTOLIC BLOOD PRESSURE: 112 MMHG | OXYGEN SATURATION: 98 % | WEIGHT: 174.2 LBS | HEART RATE: 86 BPM | DIASTOLIC BLOOD PRESSURE: 72 MMHG | BODY MASS INDEX: 29.74 KG/M2 | HEIGHT: 64 IN
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
## 2023-04-07 DIAGNOSIS — S30.861A TICK BITE OF ABDOMINAL WALL, INITIAL ENCOUNTER: ICD-10-CM

## 2023-04-07 DIAGNOSIS — S30.861A TICK BITE OF ABDOMINAL WALL, INITIAL ENCOUNTER: Primary | ICD-10-CM
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
## 2023-04-07 DIAGNOSIS — R45.86 MOOD SWINGS: ICD-10-CM

## 2023-04-07 PROCEDURE — 86666 EHRLICHIA ANTIBODY: CPT

## 2023-04-07 PROCEDURE — 36415 COLL VENOUS BLD VENIPUNCTURE: CPT

## 2023-04-07 PROCEDURE — 86757 RICKETTSIA ANTIBODY: CPT

## 2023-04-07 PROCEDURE — 86618 LYME DISEASE ANTIBODY: CPT

## 2023-04-07 RX ORDER — DOXYCYCLINE HYCLATE 100 MG/1
100 CAPSULE ORAL 2 TIMES DAILY
Qty: 20 CAPSULE | Refills: 0 | Status: SHIPPED | OUTPATIENT
Start: 2023-04-07 | End: 2023-04-17

## 2023-04-07 NOTE — PROGRESS NOTES
"Chief Complaint  Tick Removal (Tick Bite )    Subjective    Has tick bite on her right abdomen and right flank. Onset two weeks ago.  Her mother passed away 2 weeks ago and has been having increasing mood swings and irritability.  \"I want a referral to a psychiatrist.  I think I need to talk to someone.\"  Admits to not taking her Seroquel for 5 days.  \"With mom passing away I just forgot.\"        Natividad Montgomery presents to [facility] [place]  Depression  Visit Type: follow-up  Patient presents with the following symptoms: decreased concentration, depressed mood, excessive worry, irritability and nervousness/anxiety.  Frequency of symptoms: constantly   Severity: moderate   Sleep quality: poor  Nighttime awakenings: one to two    Insect Bite  This is a new problem. The problem occurs daily. The problem has been unchanged. Nothing aggravates the symptoms. She has tried nothing for the symptoms. The treatment provided no relief.     Current Outpatient Medications on File Prior to Visit   Medication Sig Dispense Refill   • 24HR Allergy Relief 180 MG tablet TAKE 1 TABLET BY MOUTH DAILY. 30 tablet 1   • celecoxib (CeleBREX) 100 MG capsule TAKE 1 CAPSULE BY MOUTH 2 (TWO) TIMES A DAY. 60 capsule 1   • Cholecalciferol (Vitamin D3) 1.25 MG (29362 UT) capsule TAKE ONE CAPSULE BY MOUTH ONCE A WEEK 4 capsule 48   • Docusate Sodium (COLACE PO) Take  by mouth Daily.     • fluticasone (FLONASE) 50 MCG/ACT nasal spray      • hydrocortisone (ANUSOL-HC) 25 MG suppository Insert 1 suppository into the rectum 2 (Two) Times a Day As Needed for Hemorrhoids. 60 suppository 1   • hydrOXYzine (ATARAX) 10 MG tablet Take 1 tablet by mouth Every 8 (Eight) Hours As Needed for Anxiety. 90 tablet 3   • metoprolol succinate XL (TOPROL-XL) 25 MG 24 hr tablet Take 1 tablet by mouth Daily. 90 tablet 3   • omega-3 acid ethyl esters (LOVAZA) 1 g capsule TAKE 1 CAPSULE BY MOUTH DAILY WITH BREAKFAST. 30 " "capsule 2   • QUEtiapine (SEROquel) 50 MG tablet TAKE 1 TABLET BY MOUTH EVERY NIGHT. 30 tablet 3     No current facility-administered medications on file prior to visit.     Allergies   Allergen Reactions   • Latex Itching   • Sulfa Antibiotics Other (See Comments)     \"Run a high fever\"       Objective   Vital Signs:  /72   Pulse 86   Ht 162.6 cm (64\")   Wt 79 kg (174 lb 3.2 oz)   SpO2 98%   BMI 29.90 kg/m²   Estimated body mass index is 29.9 kg/m² as calculated from the following:    Height as of this encounter: 162.6 cm (64\").    Weight as of this encounter: 79 kg (174 lb 3.2 oz).         Physical Exam  Vitals and nursing note reviewed.   Constitutional:       Appearance: She is well-developed.   HENT:      Head: Normocephalic.      Right Ear: External ear normal.      Left Ear: External ear normal.   Eyes:      Pupils: Pupils are equal, round, and reactive to light.   Cardiovascular:      Rate and Rhythm: Normal rate and regular rhythm.      Heart sounds: Normal heart sounds.   Pulmonary:      Effort: Pulmonary effort is normal.      Breath sounds: Normal breath sounds.   Abdominal:      General: Bowel sounds are normal.      Palpations: Abdomen is soft.   Musculoskeletal:         General: Normal range of motion.      Cervical back: Normal range of motion and neck supple.   Skin:     General: Skin is warm.      Capillary Refill: Capillary refill takes less than 2 seconds.   Neurological:      Mental Status: She is alert and oriented to person, place, and time.   Psychiatric:         Mood and Affect: Mood is anxious and depressed.         Speech: Speech is rapid and pressured.        Result Review :                   Assessment and Plan   Diagnoses and all orders for this visit:    1. Tick bite of abdominal wall, initial encounter (Primary)  -     Lyme Disease Total Antibody With Reflex to Immunoassay; Future  -     [facility] (IgG / M); Future  -     Ehrlichia Antibody Panel; Future  -     " doxycycline (VIBRAMYCIN) 100 MG capsule; Take 1 capsule by mouth 2 (Two) Times a Day for 10 days.  Dispense: 20 capsule; Refill: 0    2. Mood swings  -     Ambulatory Referral to Psychiatry; Future      1.  Tick bite of abdominal wall, initial encounter:  Complete Lyme titer, [place] spotted fever and ear Tatum antibody as ordered and will notify of results when available  Begin doxycycline as prescribed  Educated on importance of completing full dose of antibiotic therapy  Use of DEET or OFF spray prior to going outside    2.  Mood swings:  Referral placed to DHARMESH Levi and will contact her to schedule appointment  Continue Seroquel as previously prescribed  Educated on importance of not missing doses of this medication as well as potential side effects for abrupt discontinuation       Follow Up   Return if symptoms worsen or fail to improve, for Next scheduled follow up.  Patient was given instructions and counseling regarding her condition or for health maintenance advice. Please see specific information pulled into the AVS if appropriate.         This document has been electronically signed by DHARMESH Garza on April 7, 2023 13:44 CDT

## 2023-04-08 LAB — B BURGDOR IGG+IGM SER QL IA: NEGATIVE

## 2023-04-10 ENCOUNTER — HOSPITAL ENCOUNTER (OUTPATIENT)
Dept: PHYSICAL THERAPY | Facility: HOSPITAL | Age: 58
Setting detail: THERAPIES SERIES
Discharge: HOME | End: 2023-04-10
Payer: COMMERCIAL

## 2023-04-10 DIAGNOSIS — M25.571 CHRONIC PAIN OF RIGHT ANKLE: Primary | ICD-10-CM

## 2023-04-10 DIAGNOSIS — G89.29 CHRONIC PAIN OF RIGHT ANKLE: Primary | ICD-10-CM

## 2023-04-10 LAB
A PHAGOCYTOPH IGG TITR SER IF: NEGATIVE {TITER}
A PHAGOCYTOPH IGM TITR SER IF: NEGATIVE {TITER}
E CHAFFEENSIS IGG TITR SER IF: NEGATIVE {TITER}
E CHAFFEENSIS IGM TITR SER IF: NEGATIVE {TITER}

## 2023-04-10 PROCEDURE — 97110 THERAPEUTIC EXERCISES: CPT

## 2023-04-10 NOTE — THERAPY DISCHARGE NOTE
Outpatient Physical Therapy Ortho Progress Note/Discharge Summary  [facility]     Patient Name: Natividad Montgomery  : 1965  MRN: [id number]  Today's Date: 4/10/2023      Visit Date: 04/10/2023  Attendance:    Subjective Improvement: 90%  Next MD Visit: TBORTIZ  Recert Date: d/c     Therapy Diagnosis: chronic R ankle pain    Visit Dx:    ICD-10-CM ICD-9-CM   1. Chronic pain of right ankle  M25.571 719.47    G89.29 338.29       Patient Active Problem List   Diagnosis   • Chronic pain of right knee   • Cyst of right knee joint   • Depression   • Patellofemoral arthritis of right knee   • GERD (gastroesophageal reflux disease)   • Acute pain of right shoulder        Past Medical History:   Diagnosis Date   • Allergic    • Anxiety    • Arthritis    • Breast cyst    • Bronchitis    • Colon polyp    • Depression    • DVT (deep venous thrombosis)    • Fibrocystic breast    • GERD (gastroesophageal reflux disease)    • Hard to intubate    • Headache    • Strep throat    • Urinary tract infection         Past Surgical History:   Procedure Laterality Date   •  SECTION     • COLONOSCOPY     • EXCISION MASS LEG Right 2018    Procedure: EXCISION MASS RIGHT KNEE                             (LATEX ALLERGY);  Surgeon: Jose David Desai MD;  Location: [facility];  Service: Orthopedics   • SHOULDER SURGERY      right shoulder   • SUBTOTAL HYSTERECTOMY     • TONSILLECTOMY     • VAGINAL DILATATION      age 15        PT Ortho     Row Name 04/10/23 1300       Subjective Comments    Subjective Comments Pt stated that her ankle is doing better. She has not worn her boots lately which she thinks has helped a lot.  -MH       Subjective Pain    Able to rate subjective pain? yes  -MH    Pre-Treatment Pain Level 0  -MH    Post-Treatment Pain Level 0  -MH       Right Lower Ext    Rt Knee Extension/Flexion AROM WNL  -    Rt Ankle Dorsiflexion AROM 11 deg  -MH    Rt Ankle Plantarflexion AROM WFL  -     Rt Ankle Inversion AROM 32 deg  -MH    Rt Ankle Eversion AROM 24 deg  -    RT Lower Extremity Comments Hip, knee, and toes WFL grossly. Pt able to fully flex and ext all 5 toes.  -       Left Lower Ext    LT Lower Extremity Comments AROM WFL grossly  -       MMT Right Lower Ext    Rt Hip Flexion MMT, Gross Movement (5/5) normal  -    Rt Hip Extension MMT, Gross Movement (4+/5) good plus  -    Rt Hip ABduction MMT, Gross Movement (5/5) normal  -    Rt Hip ADduction MMT, Gross Movement (5/5) normal  -    Rt Hip Internal (Medial) Rotation MMT, Gross Movement (5/5) normal  -    Rt Hip External (Lateral) Rotation MMT, Gross Movement (5/5) normal  -    Rt Knee Extension MMT, Gross Movement (5/5) normal  -    Rt Knee Flexion MMT, Gross Movement (5/5) normal  -    Rt Ankle Plantarflexion MMT, Gross Movement (5/5) normal  -    Rt Ankle Dorsiflexion MMT, Gross Movement (5/5) normal  -    Rt Ankle Subtalar Inversion MT, Gross Movement (5/5) normal  -    Rt Ankle Subtalar Eversion MMT, Gross Movement (5/5) normal  -    Rt Lower Extremity Comments  pain free  -       Sensation    Sensation WNL? WNL  -    Light Touch No apparent deficits  -       Lower Extremity Flexibility    Gastrocnemius Mildly limited  -    Soleus Mildly limited  -       Balance Skills Training    SLS WNL bilat (10 sec or > bilat)  -    Sharpened Rhomberg 10-15 sec bilat  -          User Key  (r) = Recorded By, (t) = Taken By, (c) = Cosigned By    Initials Name Provider Type     Sara Loyola, PT Physical Therapist                             PT Assessment/Plan     Row Name 04/10/23 1300          PT Assessment    Impairments Balance;Muscle strength  -     Assessment Comments Recheck completed this visit. Pt has met 3/4 goals to date. She has demonstrated significant improvements in ROM, LE strength, LE flexibility, balance, and gait mechanics. Only deficits this date are for mild ankle instability and  balance. Time was spent updating/reviewing pt’s HEP for self-management. At this time, she is d/c from skilled PT to home with HEP.  -MH     Rehab Potential Fair  -     Patient/caregiver participated in establishment of treatment plan and goals Yes  -        PT Plan    Predicted Duration of Therapy Intervention (PT) d/c  -     PT Plan Comments d/c home with HEP  -           User Key  (r) = Recorded By, (t) = Taken By, (c) = Cosigned By    Initials Name Provider Type    Sara Millan, PT Physical Therapist                     OP Exercises     Row Name 04/10/23 1300             Subjective Comments    Subjective Comments Pt stated that her ankle is doing better. She has not worn her boots lately which she thinks has helped a lot.  -         Subjective Pain    Able to rate subjective pain? yes  -      Pre-Treatment Pain Level 0  -      Post-Treatment Pain Level 0  -         Total Minutes    49362 - PT Therapeutic Exercise Minutes 39  -         Exercise 1    Exercise Name 1 Incline gastroc S  -MH      Sets 1 3  -MH      Time 1 30 sec hold  -         Exercise 2    Exercise Name 2 Standing HR and TR  -MH      Sets 2 1  -MH      Reps 2 20 each  -         Exercise 3    Exercise Name 3 Recheck  -         Exercise 4    Exercise Name 4 Toe yoga  -MH      Sets 4 1  -MH      Reps 4 20 each  -MH         Exercise 5    Exercise Name 5 tandem balance  -MH      Sets 5 3  -MH      Reps 5 15 sec  -      Additional Comments bilat  -         Exercise 6    Exercise Name 6 SLS balance  -MH      Sets 6 2  -MH      Time 6 10 sec  -      Additional Comments bilat  -         Exercise 7    Exercise Name 7 HEP review  -         Exercise 8    Exercise Name 8 Education on orthotic pickup once orthotics arrive  -            User Key  (r) = Recorded By, (t) = Taken By, (c) = Cosigned By    Initials Name Provider Type    Sara Millan, PT Physical Therapist                                PT OP Goals     Row  Name 04/10/23 1300          PT Short Term Goals    STG Date to Achieve 04/05/23  -     STG 1 Pt’s ankle DF AROM will improve to 10 deg.  -     STG 1 Progress Met   -     STG 2 Pt’s ankle MMT will improve to 5/5 in all planes as a measure of improved strength.  -     STG 2 Progress Met   -     STG 3 Pt will be able to  SLS on the involved limb for at least 10 seconds as a measure of improved balance.  -     STG 3 Progress Met   -     STG 4 Pt will be able to  tandem stance with the involved limb post for at least 30 seconds as a measure of improved balance.  -     STG 4 Progress Not Met  -     STG 4 Progress Comments 15 sec  -           User Key  (r) = Recorded By, (t) = Taken By, (c) = Cosigned By    Initials Name Provider Type    Sara Millan, PT Physical Therapist                Therapy Education  Education Details: HEP: standing HR/TR, arch roller, tandem balance, SLS balance, Gastroc s  Given: HEP  Program: New, Reinforced  How Provided: Verbal, Demonstration, Written  Provided to: Patient  Level of Understanding: Verbalized, Demonstrated    Outcome Measure Options: Lower Extremity Functional Scale (LEFS)  Lower Extremity Functional Index  Any of your usual work, housework or school activities: No difficulty  Your usual hobbies, recreational or sporting activities: No difficulty  Getting into or out of the bath: No difficulty  Walking between rooms: No difficulty  Putting on your shoes or socks: No difficulty  Squatting: Moderate difficulty  Lifting an object, like a bag of groceries from the floor: No difficulty  Performing light activities around your home: No difficulty  Performing heavy activities around your home: No difficulty  Getting into or out of a car: No difficulty  Walking 2 blocks: A little bit of difficulty  Walking a mile: Moderate difficulty  Going up or down 10 stairs (about 1 flight of stairs): Moderate difficulty  Standing for 1 hour: A little bit of  difficulty  Sitting for 1 hour: No difficulty  Running on even ground: Moderate difficulty  Running on uneven ground: Moderate difficulty  Making sharp turns while running fast: Quite a bit of difficulty  Hopping: Moderate difficulty  Rolling over in bed: No difficulty  Total: 63      Time Calculation:   Start Time: 1301  Stop Time: 1340  Time Calculation (min): 39 min  Timed Charges  95592 - PT Therapeutic Exercise Minutes: 39  Total Minutes  Timed Charges Total Minutes: 39   Total Minutes: 39  Therapy Charges for Today     Code Description Service Date Service Provider Modifiers Qty    11491617183 HC PT THER PROC EA 15 MIN 4/10/2023 Sara Loyola, MEHDI GP 3          PT G-Codes  Outcome Measure Options: Lower Extremity Functional Scale (LEFS)  Total: 63     OP PT Discharge Summary  Date of Discharge: 04/10/23  Reason for Discharge: Maximum functional potential achieved, Independent  Outcomes Achieved: Patient able to partially acheive established goals, Refer to plan of care for updates on goals achieved  Discharge Destination: Home with home program      Sara Loyola PT, DPT  4/10/2023

## 2023-04-11 LAB
R RICKETTSI IGG SER QL IA: NORMAL
R RICKETTSI IGG TITR SER IF: NORMAL {TITER}
R RICKETTSI IGM SER-ACNC: 0.39 INDEX (ref 0–0.89)

## 2023-04-12 ENCOUNTER — APPOINTMENT (OUTPATIENT)
Dept: PHYSICAL THERAPY | Facility: HOSPITAL | Age: 58
End: 2023-04-12
Payer: COMMERCIAL

## 2023-04-14 ENCOUNTER — TELEPHONE (OUTPATIENT)
Dept: FAMILY MEDICINE CLINIC | Facility: CLINIC | Age: 58
End: 2023-04-14
Payer: COMMERCIAL

## 2023-04-14 NOTE — TELEPHONE ENCOUNTER
Per DHARMESH Gonzalez, Ms. Montgomery has been called with recent lab results & recommendations.  Continue current medications and follow-up as planned or sooner if any problems.       ----- Message from DHARMESH Garza sent at 4/11/2023  3:25 PM CDT -----  Labs normal, please call or send card!     Cephalexin Counseling: I counseled the patient regarding use of cephalexin as an antibiotic for prophylactic and/or therapeutic purposes. Cephalexin (commonly prescribed under brand name Keflex) is a cephalosporin antibiotic which is active against numerous classes of bacteria, including most skin bacteria. Side effects may include nausea, diarrhea, gastrointestinal upset, rash, hives, yeast infections, and in rare cases, hepatitis, kidney disease, seizures, fever, confusion, neurologic symptoms, and others. Patients with severe allergies to penicillin medications are cautioned that there is about a 10% incidence of cross-reactivity with cephalosporins. When possible, patients with penicillin allergies should use alternatives to cephalosporins for antibiotic therapy.

## 2023-04-14 NOTE — TELEPHONE ENCOUNTER
Per DHARMESH Gonzalez, Ms. Montgomery has been called with recent lab results & recommendations.  Continue current medications and follow-up as planned or sooner if any problems.       ----- Message from DHARMESH Garza sent at 4/10/2023  6:23 AM CDT -----  Labs normal, please call or send card!

## 2023-04-19 ENCOUNTER — OFFICE VISIT (OUTPATIENT)
Dept: GASTROENTEROLOGY | Facility: CLINIC | Age: 58
End: 2023-04-19
Payer: COMMERCIAL

## 2023-04-19 VITALS
SYSTOLIC BLOOD PRESSURE: 110 MMHG | DIASTOLIC BLOOD PRESSURE: 62 MMHG | BODY MASS INDEX: 28.41 KG/M2 | HEIGHT: 64 IN | WEIGHT: 166.4 LBS | HEART RATE: 82 BPM

## 2023-04-19 DIAGNOSIS — Z80.0 FH: COLON CANCER: ICD-10-CM

## 2023-04-19 DIAGNOSIS — Z12.11 ENCOUNTER FOR SCREENING FOR MALIGNANT NEOPLASM OF COLON: Primary | ICD-10-CM

## 2023-04-19 RX ORDER — SODIUM PICOSULFATE, MAGNESIUM OXIDE, AND ANHYDROUS CITRIC ACID 10; 3.5; 12 MG/160ML; G/160ML; G/160ML
160 LIQUID ORAL SEE ADMIN INSTRUCTIONS
Qty: 320 ML | Refills: 0 | Status: SHIPPED | OUTPATIENT
Start: 2023-04-19

## 2023-04-19 RX ORDER — SODIUM CHLORIDE 9 MG/ML
40 INJECTION, SOLUTION INTRAVENOUS AS NEEDED
OUTPATIENT
Start: 2023-04-19

## 2023-04-19 RX ORDER — DEXTROSE AND SODIUM CHLORIDE 5; .45 G/100ML; G/100ML
30 INJECTION, SOLUTION INTRAVENOUS CONTINUOUS PRN
OUTPATIENT
Start: 2023-04-19

## 2023-04-19 NOTE — PROGRESS NOTES
"                                                                                                                  Chief Complaint   Patient presents with   • Colon Cancer Screening       Subjective    Natividad Montgomery is a 58 y.o. female. she is here today for follow-up.                                                                  Assessment & Plan                                     1. Encounter for screening for malignant neoplasm of colon    2. FH: colon cancer    Plan; schedule patient for screening colonoscopy for surveillance.    Follow-up: Return in about 4 weeks (around 5/17/2023) for Recheck, After test.     HPI  58-year-old female with concurrent medical history of depression, chronic heartburn, anxiety presents to discuss screening colonoscopy.  Reports she had normal colonoscopy in 2014 and did Cologuard after that but reports she has been history of colon cancer in paternal aunt.  Reports intermittent issues with constipation and hemorrhoids but once she gets her bowel movements regulated with over-the-counter stool softener symptoms have improved she was given Anusol suppository and reports that it has helped hemorrhoids recently.  She denies any GI complaints at this time.      Review of Systems  Review of Systems   Constitutional: Negative for activity change, appetite change, chills, diaphoresis, fatigue, fever and unexpected weight change.   HENT: Negative for sore throat and trouble swallowing.    Respiratory: Negative for shortness of breath.    Gastrointestinal: Positive for constipation (occ takes colace as needed ). Negative for abdominal distention, abdominal pain, anal bleeding, blood in stool, diarrhea, nausea, rectal pain and vomiting.   Musculoskeletal: Negative for arthralgias.   Skin: Negative for pallor.   Neurological: Negative for light-headedness.       /62 (BP Location: Left arm)   Pulse 82   Ht 162.6 cm (64\")   Wt 75.5 kg (166 lb 6.4 oz)   LMP 01/01/1999 " (Approximate)   BMI 28.56 kg/m²     Objective      Physical Exam  Constitutional:       General: She is not in acute distress.     Appearance: Normal appearance. She is normal weight. She is not ill-appearing.   HENT:      Head: Normocephalic and atraumatic.   Abdominal:      General: Abdomen is flat. Bowel sounds are normal. There is no distension.      Palpations: There is no mass.      Tenderness: There is no abdominal tenderness.   Neurological:      Mental Status: She is alert.               The following portions of the patient's history were reviewed and updated as appropriate:   Past Medical History:   Diagnosis Date   • Allergic    • Anxiety    • Arthritis    • Breast cyst    • Bronchitis    • Colon polyp    • Depression    • DVT (deep venous thrombosis)    • Fibrocystic breast    • GERD (gastroesophageal reflux disease)    • Hard to intubate    • Headache    • Strep throat    • Urinary tract infection      Past Surgical History:   Procedure Laterality Date   •  SECTION     • COLONOSCOPY     • EXCISION MASS LEG Right 2018    Procedure: EXCISION MASS RIGHT KNEE                             (LATEX ALLERGY);  Surgeon: Jose David Desai MD;  Location: [facility];  Service: Orthopedics   • SHOULDER SURGERY      right shoulder   • SUBTOTAL HYSTERECTOMY     • TONSILLECTOMY     • VAGINAL DILATATION      age 15     Family History   Problem Relation Age of Onset   • Heart disease Mother    • Hypertension Mother    • Lung cancer Father    • Hypertension Father    • Thyroid disease Sister    • Hyperlipidemia Sister    • Hypertension Sister    • Thyroid disease Brother    • Hypertension Brother    • Hyperlipidemia Brother    • No Known Problems Daughter    • Heart failure Maternal Grandmother    • Heart attack Maternal Grandfather    • Heart disease Paternal Grandmother    • No Known Problems Paternal Grandfather    • Hypertension Sister    • Hypertension Sister    • Deep vein thrombosis  "Other    • Diabetes Other    • Cancer Other    • Colon cancer Paternal Aunt      OB History        1    Para   1    Term   1            AB        Living           SAB        IAB        Ectopic        Molar        Multiple        Live Births                  Allergies   Allergen Reactions   • Latex Itching   • Sulfa Antibiotics Other (See Comments)     \"Run a high fever\"     Social History     Socioeconomic History   • Marital status:    Tobacco Use   • Smoking status: Every Day     Packs/day: 1.00     Types: Cigarettes   • Smokeless tobacco: Never   Vaping Use   • Vaping Use: Never used   Substance and Sexual Activity   • Alcohol use: Yes     Comment: socially   • Drug use: Not Currently     Types: Methamphetamines   • Sexual activity: Defer     Current Medications:  Prior to Admission medications    Medication Sig Start Date End Date Taking? Authorizing Provider   24HR Allergy Relief 180 MG tablet TAKE 1 TABLET BY MOUTH DAILY. 3/10/23  Yes Lisa Henry APRN   celecoxib (CeleBREX) 100 MG capsule TAKE 1 CAPSULE BY MOUTH 2 (TWO) TIMES A DAY. 3/10/23  Yes Lisa Henry APRN   Cholecalciferol (Vitamin D3) 1.25 MG (37001 UT) capsule TAKE ONE CAPSULE BY MOUTH ONCE A WEEK 22  Yes Lisa Henry APRN   Docusate Sodium (COLACE PO) Take  by mouth Daily.   Yes Provider, MD Tyrell   fluticasone (FLONASE) 50 MCG/ACT nasal spray  20  Yes ProviderTyrell MD   hydrocortisone (ANUSOL-HC) 25 MG suppository Insert 1 suppository into the rectum 2 (Two) Times a Day As Needed for Hemorrhoids. 3/29/23  Yes Lisa Henry APRN   hydrOXYzine (ATARAX) 10 MG tablet Take 1 tablet by mouth Every 8 (Eight) Hours As Needed for Anxiety. 3/29/23  Yes Lisa Henry APRN   metoprolol succinate XL (TOPROL-XL) 25 MG 24 hr tablet Take 1 tablet by mouth Daily. 23  Yes Padmini Ag APRN   omega-3 acid ethyl esters (LOVAZA) 1 g capsule TAKE 1 CAPSULE BY MOUTH DAILY WITH BREAKFAST. 2/10/23  Yes " Lisa Henry APRN   QUEtiapine (SEROquel) 50 MG tablet TAKE 1 TABLET BY MOUTH EVERY NIGHT. 2/10/23  Yes Lisa Henry APRN     Orders placed during this encounter include:  Orders Placed This Encounter   Procedures   • Obtain Informed Consent     Standing Status:   Future     Order Specific Question:   Informed Consent Given For     Answer:   COLONOSCOPY     COLONOSCOPY (N/A)  New Medications Ordered This Visit   Medications   • Clenpiq 10-3.5-12 MG-GM -GM/160ML solution     Sig: Take 160 mL by mouth See Admin Instructions.     Dispense:  320 mL     Refill:  0         Review and/or summary of lab tests, radiology, procedures, medications. Review and summary of old records and obtaining of history. The risks and benefits of my recommendations, as well as other treatment options were discussed . Any questions/concerned were answered. Patient voiced understanding and agreement.          This document has been electronically signed by DHARMESH Rolle on April 19, 2023 09:25 CDT                                               Results for orders placed or performed in visit on 04/07/23   Lyme Disease Total Antibody With Reflex to Immunoassay    Specimen: Blood   Result Value Ref Range    Lyme Total Antibody EIA Negative Negative   Reflexed RMSF, IgG, IFA    Specimen: Blood   Result Value Ref Range    RMSF IgG <1:64 Neg <1:64   [place] SF (IgG / M)    Specimen: Blood   Result Value Ref Range    RMSF IgG Equivocal Negative    RMSF IgM 0.39 0.00 - 0.89 index   Ehrlichia Antibody Panel    Specimen: Blood   Result Value Ref Range    E. chaffeensis (HME) IgG Titer Negative Neg:<1:64    E. chaffeensis (HME) IgM Titer Negative Neg:<1:20    HGE IgG Titer Negative Neg:<1:64    HGE IgM Titer Negative Neg:<1:20   Results for orders placed or performed in visit on 12/08/22   Mobile Cardiac Outpatient Telemetry   Result Value Ref Range    Target HR (85%) 138 bpm    Max. Pred. HR (100%) 162 bpm   Results for orders placed or  performed in visit on 12/08/22   ECG 12 Lead   Result Value Ref Range    QT Interval 376 ms    QTC Interval 417 ms   Results for orders placed or performed in visit on 10/28/22   CBC Auto Differential    Specimen: Blood   Result Value Ref Range    WBC 5.27 3.40 - 10.80 10*3/mm3    RBC 4.49 3.77 - 5.28 10*6/mm3    Hemoglobin 13.9 12.0 - 15.9 g/dL    Hematocrit 40.6 34.0 - 46.6 %    MCV 90.4 79.0 - 97.0 fL    MCH 31.0 26.6 - 33.0 pg    MCHC 34.2 31.5 - 35.7 g/dL    RDW 12.6 12.3 - 15.4 %    RDW-SD 41.1 37.0 - 54.0 fl    MPV 10.2 6.0 - 12.0 fL    Platelets 288 140 - 450 10*3/mm3    Neutrophil % 55.2 42.7 - 76.0 %    Lymphocyte % 38.7 19.6 - 45.3 %    Monocyte % 3.6 (L) 5.0 - 12.0 %    Eosinophil % 1.9 0.3 - 6.2 %    Basophil % 0.4 0.0 - 1.5 %    Immature Grans % 0.2 0.0 - 0.5 %    Neutrophils, Absolute 2.91 1.70 - 7.00 10*3/mm3    Lymphocytes, Absolute 2.04 0.70 - 3.10 10*3/mm3    Monocytes, Absolute 0.19 0.10 - 0.90 10*3/mm3    Eosinophils, Absolute 0.10 0.00 - 0.40 10*3/mm3    Basophils, Absolute 0.02 0.00 - 0.20 10*3/mm3    Immature Grans, Absolute 0.01 0.00 - 0.05 10*3/mm3    nRBC 0.0 0.0 - 0.2 /100 WBC   TSH    Specimen: Blood   Result Value Ref Range    TSH 2.110 0.270 - 4.200 uIU/mL   Lipid Panel    Specimen: Blood   Result Value Ref Range    Total Cholesterol 199 0 - 200 mg/dL    Triglycerides 91 0 - 150 mg/dL    HDL Cholesterol 57 40 - 60 mg/dL    LDL Cholesterol  126 (H) 0 - 100 mg/dL    VLDL Cholesterol 16 5 - 40 mg/dL    LDL/HDL Ratio 2.17    Comprehensive Metabolic Panel    Specimen: Blood   Result Value Ref Range    Glucose 92 65 - 99 mg/dL    BUN 15 6 - 20 mg/dL    Creatinine 0.73 0.57 - 1.00 mg/dL    Sodium 140 136 - 145 mmol/L    Potassium 4.0 3.5 - 5.2 mmol/L    Chloride 105 98 - 107 mmol/L    CO2 24.3 22.0 - 29.0 mmol/L    Calcium 9.1 8.6 - 10.5 mg/dL    Total Protein 6.8 6.0 - 8.5 g/dL    Albumin 4.10 3.50 - 5.20 g/dL    ALT (SGPT) 10 1 - 33 U/L    AST (SGOT) 18 1 - 32 U/L    Alkaline Phosphatase 76  39 - 117 U/L    Total Bilirubin 0.2 0.0 - 1.2 mg/dL    Globulin 2.7 gm/dL    A/G Ratio 1.5 g/dL    BUN/Creatinine Ratio 20.5 7.0 - 25.0    Anion Gap 10.7 5.0 - 15.0 mmol/L    eGFR 95.5 >60.0 mL/min/1.73   Results for orders placed or performed in visit on 10/28/22   ECG 12 Lead   Result Value Ref Range    QT Interval 426 ms    QTC Interval 407 ms   Results for orders placed or performed in visit on 09/20/21   Liquid-based Pap Smear, Screening    Specimen: Cervix; ThinPrep Vial   Result Value Ref Range    Case Report       Gynecologic Cytology Report                       Case: [id number]                                  Authorizing Provider:  Ani Bey       Collected:           09/20/2021 01:33 PM                                 DHARMESH Beach                                                                 Ordering Location:     [facility]   Received:            09/20/2021 02:15 PM                                 [facility]                                                         First Screen:          Larry Ceballos                                                              Specimen:    Sendout to [facility], Cervix                                                                     Interpretation See attached report    Results for orders placed or performed in visit on 07/19/21   CBC Auto Differential    Specimen: Blood   Result Value Ref Range    WBC 4.90 3.40 - 10.80 10*3/mm3    RBC 4.57 3.77 - 5.28 10*6/mm3    Hemoglobin 14.4 12.0 - 15.9 g/dL    Hematocrit 41.6 34.0 - 46.6 %    MCV 91.0 79.0 - 97.0 fL    MCH 31.5 26.6 - 33.0 pg    MCHC 34.6 31.5 - 35.7 g/dL    RDW 13.1 12.3 - 15.4 %    RDW-SD 44.0 37.0 - 54.0 fl    MPV 10.2 6.0 - 12.0 fL    Platelets 312 140 - 450 10*3/mm3    Neutrophil % 46.1 42.7 - 76.0 %    Lymphocyte % 42.9 19.6 - 45.3 %    Monocyte % 5.5 5.0 - 12.0 %    Eosinophil % 5.1 0.3 - 6.2 %    Basophil % 0.2 0.0 - 1.5 %    Immature Grans % 0.2 0.0 - 0.5 %     Neutrophils, Absolute 2.26 1.70 - 7.00 10*3/mm3    Lymphocytes, Absolute 2.10 0.70 - 3.10 10*3/mm3    Monocytes, Absolute 0.27 0.10 - 0.90 10*3/mm3    Eosinophils, Absolute 0.25 0.00 - 0.40 10*3/mm3    Basophils, Absolute 0.01 0.00 - 0.20 10*3/mm3    Immature Grans, Absolute 0.01 0.00 - 0.05 10*3/mm3    nRBC 0.0 0.0 - 0.2 /100 WBC   TSH    Specimen: Blood   Result Value Ref Range    TSH 1.410 0.270 - 4.200 uIU/mL   Lipid Panel    Specimen: Blood   Result Value Ref Range    Total Cholesterol 196 0 - 200 mg/dL    Triglycerides 112 0 - 150 mg/dL    HDL Cholesterol 51 40 - 60 mg/dL    LDL Cholesterol  125 (H) 0 - 100 mg/dL    VLDL Cholesterol 20 5 - 40 mg/dL    LDL/HDL Ratio 2.40    Comprehensive Metabolic Panel    Specimen: Blood   Result Value Ref Range    Glucose 105 (H) 65 - 99 mg/dL    BUN 9 6 - 20 mg/dL    Creatinine 0.70 0.57 - 1.00 mg/dL    Sodium 141 136 - 145 mmol/L    Potassium 4.0 3.5 - 5.2 mmol/L    Chloride 104 98 - 107 mmol/L    CO2 25.9 22.0 - 29.0 mmol/L    Calcium 9.3 8.6 - 10.5 mg/dL    Total Protein 6.6 6.0 - 8.5 g/dL    Albumin 4.50 3.50 - 5.20 g/dL    ALT (SGPT) 12 1 - 33 U/L    AST (SGOT) 14 1 - 32 U/L    Alkaline Phosphatase 86 39 - 117 U/L    Total Bilirubin 0.4 0.0 - 1.2 mg/dL    eGFR Non African Amer 86 >60 mL/min/1.73    Globulin 2.1 gm/dL    A/G Ratio 2.1 g/dL    BUN/Creatinine Ratio 12.9 7.0 - 25.0    Anion Gap 11.1 5.0 - 15.0 mmol/L     *Note: Due to a large number of results and/or encounters for the requested time period, some results have not been displayed. A complete set of results can be found in Results Review.

## 2023-05-01 ENCOUNTER — OFFICE VISIT (OUTPATIENT)
Dept: FAMILY MEDICINE CLINIC | Facility: CLINIC | Age: 58
End: 2023-05-01
Payer: COMMERCIAL

## 2023-05-01 VITALS
SYSTOLIC BLOOD PRESSURE: 104 MMHG | DIASTOLIC BLOOD PRESSURE: 62 MMHG | WEIGHT: 164.9 LBS | BODY MASS INDEX: 28.15 KG/M2 | HEIGHT: 64 IN | HEART RATE: 83 BPM | OXYGEN SATURATION: 98 %

## 2023-05-01 DIAGNOSIS — F33.1 MODERATE EPISODE OF RECURRENT MAJOR DEPRESSIVE DISORDER: ICD-10-CM

## 2023-05-01 DIAGNOSIS — M54.2 CHRONIC NECK PAIN: ICD-10-CM

## 2023-05-01 DIAGNOSIS — G89.29 CHRONIC NECK PAIN: ICD-10-CM

## 2023-05-01 DIAGNOSIS — K21.9 GASTROESOPHAGEAL REFLUX DISEASE WITHOUT ESOPHAGITIS: Primary | ICD-10-CM

## 2023-05-01 PROCEDURE — 1159F MED LIST DOCD IN RCRD: CPT | Performed by: NURSE PRACTITIONER

## 2023-05-01 PROCEDURE — 1160F RVW MEDS BY RX/DR IN RCRD: CPT | Performed by: NURSE PRACTITIONER

## 2023-05-01 PROCEDURE — 99213 OFFICE O/P EST LOW 20 MIN: CPT | Performed by: NURSE PRACTITIONER

## 2023-05-01 RX ORDER — QUETIAPINE FUMARATE 100 MG/1
100 TABLET, FILM COATED ORAL NIGHTLY
Qty: 30 TABLET | Refills: 3 | Status: SHIPPED | OUTPATIENT
Start: 2023-05-01

## 2023-05-01 NOTE — PROGRESS NOTES
"Chief Complaint  Bipolar Depression     Subjective    Six month follow-up depression, GERD and neck pain. Depression and anxiety have been increasing over the last four weeks.  Her mother recently passed away.  Had referral placed to DHARMESH Hernandez with mental health at previous visit..  \"I do not want to stop my medicines.  I feel really good on these medicines but its been rough these last few weeks.\"      Natividad Montgomery presents to [facility] - [place]  Depression  Visit Type: follow-up  Patient presents with the following symptoms: depressed mood, excessive worry and irritability.  Patient is not experiencing: confusion, suicidal ideas, suicidal planning and thoughts of death.  Frequency of symptoms: most days   Severity: moderate   Sleep quality: fair  Nighttime awakenings: occasional  Compliance with medications:  %        Heartburn  She complains of heartburn. This is a chronic problem. The current episode started more than 1 year ago. The problem occurs rarely. The heartburn duration is several minutes. The heartburn is located in the substernum. The heartburn does not wake her from sleep. The heartburn does not limit her activity. The heartburn doesn't change with position. The symptoms are aggravated by certain foods and stress. Risk factors include obesity, NSAIDs, lack of exercise and caffeine use. She has tried a PPI for the symptoms. The treatment provided significant relief.   Neck Pain   This is a chronic problem. The current episode started more than 1 year ago. The problem occurs constantly. The problem has been waxing and waning. The pain is associated with nothing. The pain is present in the midline. The pain is moderate. The symptoms are aggravated by twisting. She has tried acetaminophen for the symptoms. The treatment provided mild relief.     Current Outpatient Medications on File Prior to Visit   Medication Sig Dispense Refill   • " "24HR Allergy Relief 180 MG tablet TAKE 1 TABLET BY MOUTH DAILY. 30 tablet 1   • celecoxib (CeleBREX) 100 MG capsule TAKE 1 CAPSULE BY MOUTH 2 (TWO) TIMES A DAY. 60 capsule 1   • Cholecalciferol (Vitamin D3) 1.25 MG (56607 UT) capsule TAKE ONE CAPSULE BY MOUTH ONCE A WEEK 4 capsule 48   • Clenpiq 10-3.5-12 MG-GM -GM/160ML solution Take 160 mL by mouth See Admin Instructions. 320 mL 0   • Docusate Sodium (COLACE PO) Take  by mouth Daily.     • fluticasone (FLONASE) 50 MCG/ACT nasal spray      • hydrocortisone (ANUSOL-HC) 25 MG suppository Insert 1 suppository into the rectum 2 (Two) Times a Day As Needed for Hemorrhoids. 60 suppository 1   • hydrOXYzine (ATARAX) 10 MG tablet Take 1 tablet by mouth Every 8 (Eight) Hours As Needed for Anxiety. 90 tablet 3   • metoprolol succinate XL (TOPROL-XL) 25 MG 24 hr tablet Take 1 tablet by mouth Daily. 90 tablet 3   • omega-3 acid ethyl esters (LOVAZA) 1 g capsule TAKE 1 CAPSULE BY MOUTH DAILY WITH BREAKFAST. 30 capsule 2   • [DISCONTINUED] QUEtiapine (SEROquel) 50 MG tablet TAKE 1 TABLET BY MOUTH EVERY NIGHT. 30 tablet 3     No current facility-administered medications on file prior to visit.     Allergies   Allergen Reactions   • Latex Itching   • Sulfa Antibiotics Other (See Comments)     \"Run a high fever\"       Objective   Vital Signs:  /62   Pulse 83   Ht 162.6 cm (64\")   Wt 74.8 kg (164 lb 14.4 oz)   SpO2 98%   BMI 28.31 kg/m²   Estimated body mass index is 28.31 kg/m² as calculated from the following:    Height as of this encounter: 162.6 cm (64\").    Weight as of this encounter: 74.8 kg (164 lb 14.4 oz).         Physical Exam  Vitals and nursing note reviewed.   Constitutional:       Appearance: She is well-developed.   HENT:      Head: Normocephalic.      Right Ear: External ear normal.      Left Ear: External ear normal.   Eyes:      Pupils: Pupils are equal, round, and reactive to light.   Cardiovascular:      Rate and Rhythm: Normal rate and regular " rhythm.      Heart sounds: Normal heart sounds.   Pulmonary:      Effort: Pulmonary effort is normal.      Breath sounds: Normal breath sounds.   Abdominal:      General: Bowel sounds are normal.      Palpations: Abdomen is soft.   Musculoskeletal:         General: Normal range of motion.      Cervical back: Normal range of motion and neck supple.   Skin:     General: Skin is warm.      Capillary Refill: Capillary refill takes less than 2 seconds.   Neurological:      Mental Status: She is alert and oriented to person, place, and time.   Psychiatric:         Attention and Perception: Attention normal.         Mood and Affect: Mood is anxious.         Speech: Speech is rapid and pressured.         Behavior: Behavior is hyperactive.         Thought Content: Thought content does not include homicidal or suicidal ideation. Thought content does not include homicidal or suicidal plan.        Result Review :                   Assessment and Plan   Diagnoses and all orders for this visit:    1. Gastroesophageal reflux disease without esophagitis (Primary)    2. Moderate episode of recurrent major depressive disorder  -     QUEtiapine (SEROquel) 100 MG tablet; Take 1 tablet by mouth Every Night.  Dispense: 30 tablet; Refill: 3    3. Chronic neck pain      1.  Gastroesophageal reflux disease without esophagitis:  May continue use of antacids OTC according to package directions  Avoid triggers such as lying down within 2 hours of eating, spicy, greasy foods and stress    2.  Moderate episode of recurrent major depressive disorder:  Increase Seroquel from 50 mg p.o. nightly to 100 mg p.o. nightly and refill prescription sent to pharmacy  Is scheduling appointment with psychiatric APRN for counseling  Seek emergency medical treatment for any new or worsening thoughts of hopelessness, helplessness or self-harm    3.  Chronic neck pain:  Continue Celebrex as previously prescribed       Follow Up   Return in about 4 weeks (around  5/29/2023).  Patient was given instructions and counseling regarding her condition or for health maintenance advice. Please see specific information pulled into the AVS if appropriate.         This document has been electronically signed by DHARMESH Garza on May 1, 2023 14:11 CDT

## 2023-05-05 DIAGNOSIS — K21.9 GASTROESOPHAGEAL REFLUX DISEASE WITHOUT ESOPHAGITIS: ICD-10-CM

## 2023-05-05 DIAGNOSIS — J30.2 SEASONAL ALLERGIES: ICD-10-CM

## 2023-05-05 DIAGNOSIS — M54.2 CHRONIC CERVICAL PAIN: ICD-10-CM

## 2023-05-05 DIAGNOSIS — M54.12 CERVICAL RADICULOPATHY: ICD-10-CM

## 2023-05-05 DIAGNOSIS — G89.29 CHRONIC CERVICAL PAIN: ICD-10-CM

## 2023-05-05 RX ORDER — FEXOFENADINE HYDROCHLORIDE 180 MG/1
TABLET ORAL
Qty: 30 TABLET | Refills: 1 | Status: SHIPPED | OUTPATIENT
Start: 2023-05-05

## 2023-05-05 RX ORDER — OMEGA-3-ACID ETHYL ESTERS 1 G/1
CAPSULE, LIQUID FILLED ORAL
Qty: 30 CAPSULE | Refills: 2 | Status: SHIPPED | OUTPATIENT
Start: 2023-05-05

## 2023-05-05 RX ORDER — OMEPRAZOLE 40 MG/1
CAPSULE, DELAYED RELEASE ORAL
Qty: 30 CAPSULE | Refills: 2 | Status: SHIPPED | OUTPATIENT
Start: 2023-05-05

## 2023-05-05 RX ORDER — CELECOXIB 100 MG/1
CAPSULE ORAL
Qty: 60 CAPSULE | Refills: 1 | Status: SHIPPED | OUTPATIENT
Start: 2023-05-05

## 2023-05-25 ENCOUNTER — OFFICE VISIT (OUTPATIENT)
Dept: CARDIOLOGY | Facility: CLINIC | Age: 58
End: 2023-05-25

## 2023-05-25 VITALS
SYSTOLIC BLOOD PRESSURE: 128 MMHG | HEIGHT: 64 IN | WEIGHT: 157 LBS | DIASTOLIC BLOOD PRESSURE: 82 MMHG | OXYGEN SATURATION: 96 % | HEART RATE: 50 BPM | BODY MASS INDEX: 26.8 KG/M2

## 2023-05-25 DIAGNOSIS — I49.3 PREMATURE VENTRICULAR COMPLEX: ICD-10-CM

## 2023-05-25 DIAGNOSIS — R00.2 PALPITATIONS: Primary | ICD-10-CM

## 2023-05-25 NOTE — PROGRESS NOTES
"Palpitations      Palpitations   This is a recurrent problem. The current episode started more than 1 month ago. The problem occurs intermittently. The problem has been gradually worsening. Nothing aggravates the symptoms. Pertinent negatives include no coughing, diaphoresis, dizziness, irregular heartbeat, malaise/fatigue, nausea, shortness of breath, vomiting or weakness. She has tried nothing for the symptoms. The treatment provided no relief. Risk factors include post menopause, obesity, sedentary lifestyle, smoking/tobacco exposure and dyslipidemia.   Heart Problem  The current episode started 1 to 4 weeks ago (low heart rate). The problem occurs intermittently. The problem has been gradually worsening. Pertinent negatives include no abdominal pain, coughing, diaphoresis, myalgias, nausea, vomiting or weakness. Nothing aggravates the symptoms. She has tried nothing for the symptoms. The treatment provided no relief.     Mrs. Natividad Montgomery is a 57 year old female with medical history of depression, GERD, and osteoarthritis. She has been referred to cardiology for palpitations and sinus bradycardia. EKG in PCP office also showed T wave inversion.     12/8/22, she presents for initial evaluation. She reports intermittent palpitations, chest pain, and AGOSTO. She is a current smoker and not interested in quitting at this time.  She reports that anxiety makes CP and \"fluttering\" worse. She has tried deep breathing which has helped some but not all the time.  CP is a pressure, heaviness that is mid sternal and does not radiate.    1/5/23, she presents for follow-up for palpitations.  Holter monitor showed that she had 5% PVC burden.  No sustained wide-complex tachycardia noted.  Good average heart rate.  No significant sinus bradycardia and no pauses identified during monitoring period.  He reports that she is feeling better still has occasional chest pressure and palpitations but they have are improving.  CTA of " coronary arteries was also obtained and we went over this today normal coronary calcium score.  No identified plaquing or calcification in coronary arteries and no myocardial bridging.  LVEF was noted to be 65%.    23, she reports occasional palpitations and left arm pain.  She is under a lot of stress with caring for her bedridden mother.  She has siblings that help but not enough.  Recently PCP has put her on anxiety medication.  She is also hurt her left ankle and seeing orthopedics and has been referred to therapy for her right ankle.  Palpitations come and go but nothing persistent.  She noticed the left arm pain when palpitations are present.  Outside of these things that she is doing okay.    Today, she presents for follow-up regarding palpitations.  She is tolerating metoprolol XL 25 mg daily.  Doing well.  Reports that mediction helps with palpitations.  Occasional palpitations but nothing persistent.  Denying chest pain, syncope, or near syncopal spells.  She just lost her mother and is under stress.    The 10-year ASCVD risk score (Tania DK, et al., 2019) is: 6.2%    Values used to calculate the score:      Age: 58 years      Sex: Female      Is Non- : No      Diabetic: No      Tobacco smoker: Yes      Systolic Blood Pressure: 128 mmHg      Is BP treated: No      HDL Cholesterol: 57 mg/dL      Total Cholesterol: 199 mg/dL    Cardiac Risk Factors:  hypercholesterolemia, smoking, Sedentary life style and Obesity    Past Medical History:   Diagnosis Date   • Allergic    • Anxiety    • Arthritis    • Breast cyst    • Bronchitis    • Colon polyp    • Depression    • DVT (deep venous thrombosis)    • Fibrocystic breast    • GERD (gastroesophageal reflux disease)    • Hard to intubate    • Headache    • Strep throat    • Urinary tract infection      Past Surgical History:   Procedure Laterality Date   •  SECTION     • COLONOSCOPY     • EXCISION MASS LEG Right  "8/17/2018    Procedure: EXCISION MASS RIGHT KNEE                             (LATEX ALLERGY);  Surgeon: Jose David Desai MD;  Location: [facility];  Service: Orthopedics   • SHOULDER SURGERY  2005    right shoulder   • SUBTOTAL HYSTERECTOMY     • TONSILLECTOMY     • VAGINAL DILATATION      age 15     Social History     Socioeconomic History   • Marital status:    Tobacco Use   • Smoking status: Every Day     Packs/day: 1.00     Types: Cigarettes   • Smokeless tobacco: Never   Vaping Use   • Vaping Use: Never used   Substance and Sexual Activity   • Alcohol use: Yes     Comment: socially   • Drug use: Not Currently     Types: Methamphetamines   • Sexual activity: Defer     Family History   Problem Relation Age of Onset   • Heart disease Mother    • Hypertension Mother    • Lung cancer Father    • Hypertension Father    • Thyroid disease Sister    • Hyperlipidemia Sister    • Hypertension Sister    • Thyroid disease Brother    • Hypertension Brother    • Hyperlipidemia Brother    • No Known Problems Daughter    • Heart failure Maternal Grandmother    • Heart attack Maternal Grandfather    • Heart disease Paternal Grandmother    • No Known Problems Paternal Grandfather    • Hypertension Sister    • Hypertension Sister    • Deep vein thrombosis Other    • Diabetes Other    • Cancer Other    • Colon cancer Paternal Aunt        ALLERGIES:  Allergies   Allergen Reactions   • Latex Itching   • Sulfa Antibiotics Other (See Comments)     \"Run a high fever\"       Review of Systems   Constitutional: Negative for decreased appetite, diaphoresis and malaise/fatigue.   Eyes: Negative.    Cardiovascular: Positive for palpitations (Improved). Negative for dyspnea on exertion, irregular heartbeat, leg swelling and orthopnea.   Respiratory: Negative for cough and shortness of breath.    Endocrine: Negative for polydipsia and polyphagia.   Hematologic/Lymphatic: Negative for bleeding problem. Does not bruise/bleed easily. "   Musculoskeletal: Negative for muscle cramps, muscle weakness and myalgias.   Gastrointestinal: Negative for abdominal pain, nausea and vomiting.   Neurological: Negative for dizziness, light-headedness and weakness.   Psychiatric/Behavioral:        Grieving, due to loss of mother       Current Outpatient Medications   Medication Sig Dispense Refill   • 24HR Allergy Relief 180 MG tablet TAKE 1 TABLET BY MOUTH DAILY. 30 tablet 1   • celecoxib (CeleBREX) 100 MG capsule TAKE ONE CAPSULE BY MOUTH TWICE DAILY 60 capsule 1   • Cholecalciferol (Vitamin D3) 1.25 MG (11698 UT) capsule TAKE ONE CAPSULE BY MOUTH ONCE A WEEK 4 capsule 48   • Clenpiq 10-3.5-12 MG-GM -GM/160ML solution Take 160 mL by mouth See Admin Instructions. 320 mL 0   • Docusate Sodium (COLACE PO) Take  by mouth Daily.     • fluticasone (FLONASE) 50 MCG/ACT nasal spray      • hydrocortisone (ANUSOL-HC) 25 MG suppository Insert 1 suppository into the rectum 2 (Two) Times a Day As Needed for Hemorrhoids. 60 suppository 1   • hydrOXYzine (ATARAX) 10 MG tablet Take 1 tablet by mouth Every 8 (Eight) Hours As Needed for Anxiety. 90 tablet 3   • metoprolol succinate XL (TOPROL-XL) 25 MG 24 hr tablet Take 1 tablet by mouth Daily. 90 tablet 3   • omega-3 acid ethyl esters (LOVAZA) 1 g capsule TAKE 1 CAPSULE BY MOUTH DAILY WITH BREAKFAST. 30 capsule 2   • omeprazole (priLOSEC) 40 MG capsule TAKE ONE CAPSULE BY MOUTH EVERY DAY 30 capsule 2   • QUEtiapine (SEROquel) 100 MG tablet Take 1 tablet by mouth Every Night. 30 tablet 3     No current facility-administered medications for this visit.       OBJECTIVE:    Physical Exam:   Vitals reviewed.   Constitutional:       Appearance: Normal and healthy appearance. Well-developed, well-groomed, overweight and not in distress.   Eyes:      General:         Right eye: No discharge.         Left eye: No discharge.      Conjunctiva/sclera:      Right eye: Right conjunctiva is not injected.      Left eye: Left conjunctiva is not  "injected.   HENT:      Head: Normocephalic and atraumatic.      Right Ear: External ear normal.      Left Ear: External ear normal.    Mouth/Throat:      Lips: Pink.      Mouth: Mucous membranes are moist.   Neck:      Vascular: JVD normal.   Pulmonary:      Effort: Pulmonary effort is normal.      Breath sounds: Normal breath sounds and air entry.   Cardiovascular:      Normal rate. Regular rhythm. Normal S1. Normal S2.      Murmurs: There is no murmur.      No gallop. No click.   Edema:     Peripheral edema absent.   Skin:     General: Skin is warm and dry.      Capillary Refill: Capillary refill takes less than 2 seconds.   Neurological:      Mental Status: Alert, oriented to person, place, and time and oriented to person, place and time.      Gait: Gait is intact.   Psychiatric:         Attention and Perception: Attention normal.         Mood and Affect: Affect is tearful.         Speech: Speech normal.       Vitals:    05/25/23 1325   BP: 128/82   BP Location: Left arm   Patient Position: Sitting   Cuff Size: Adult   Pulse: 50   SpO2: 96%   Weight: 71.2 kg (157 lb)   Height: 162.6 cm (64\")       DATA REVIEWED:       No radiology results for the last 30 days.  CXR:     CT:     Labs: BMP, CBC, LIPID, TSH  Lab Results   Component Value Date    GLUCOSE 92 10/28/2022    CALCIUM 9.1 10/28/2022     10/28/2022    K 4.0 10/28/2022    CO2 24.3 10/28/2022     10/28/2022    BUN 15 10/28/2022    CREATININE 0.73 10/28/2022    EGFRIFNONA 86 07/19/2021    BCR 20.5 10/28/2022    ANIONGAP 10.7 10/28/2022     Lab Results   Component Value Date    WBC 5.27 10/28/2022    HGB 13.9 10/28/2022    HCT 40.6 10/28/2022    MCV 90.4 10/28/2022     10/28/2022     Lab Results   Component Value Date    CHOL 199 10/28/2022    CHOL 196 07/19/2021    CHOL 210 (H) 10/17/2019     Lab Results   Component Value Date    TRIG 91 10/28/2022    TRIG 112 07/19/2021    TRIG 73 10/17/2019     Lab Results   Component Value Date    HDL 57 " 10/28/2022    HDL 51 07/19/2021    HDL 64 (H) 10/17/2019     No components found for: LDLCALC  Lab Results   Component Value Date     (H) 10/28/2022     (H) 07/19/2021     (H) 10/17/2019     No results found for: HDLLDLRATIO  No components found for: CHOLHDL  Lab Results   Component Value Date    TSH 2.110 10/28/2022     No results found for: PROBNP  EKG: Today       10/28/22    The following portions of the patient's history were reviewed and updated as appropriate: allergies, current medications, past family history, past medical history, past social history, past surgical history and problem list.  Old records reviewed and pertinent information is included in the above objective data.     ASSESSMENT/PLAN:     Diagnosis Plan   1. Palpitations        2. Premature ventricular complex          1./2. Palpitations/ PVC's.,  Improved.  Her monitor showing 1% PVCs burden.  Triggered events correlated with sinus tachycardia and sinus rhythm with PVCs.  Tolerating Toprol XL 25 mg daily     Has intermittent palpitations but nothing persisting.  Denying chest pain.  Doing well.      Continue Toprol XL to 25 mg daily .    I spent 15 minutes caring for Natividad on this date of service. This time includes time spent by me in the following activities: preparing for the visit, reviewing tests, obtaining and/or reviewing a separately obtained history, performing a medically appropriate examination and/or evaluation, counseling and educating the patient/family/caregiver, ordering medications, tests, or procedures and documenting information in the medical record  Return in about 6 months (around 11/25/2023) for Recheck.      Seen on 520 5/2023 at 1:30 PM.  This document has been electronically signed by DHARMESH Vidal on May 30, 2023 13:35 CDT   Electronically signed by DHARMESH Vidal, 05/30/23, 1:35 PM CDT.

## 2023-06-01 ENCOUNTER — OFFICE VISIT (OUTPATIENT)
Dept: FAMILY MEDICINE CLINIC | Facility: CLINIC | Age: 58
End: 2023-06-01
Payer: COMMERCIAL

## 2023-06-01 VITALS
OXYGEN SATURATION: 98 % | HEART RATE: 78 BPM | WEIGHT: 155.6 LBS | HEIGHT: 64 IN | SYSTOLIC BLOOD PRESSURE: 122 MMHG | BODY MASS INDEX: 26.56 KG/M2 | DIASTOLIC BLOOD PRESSURE: 72 MMHG

## 2023-06-01 DIAGNOSIS — Z13.6 SCREENING FOR HYPERTENSION: ICD-10-CM

## 2023-06-01 DIAGNOSIS — Z13.0 SCREENING FOR IRON DEFICIENCY ANEMIA: ICD-10-CM

## 2023-06-01 DIAGNOSIS — Z13.220 SCREENING FOR HYPERCHOLESTEROLEMIA: ICD-10-CM

## 2023-06-01 DIAGNOSIS — Z13.29 SCREENING FOR HYPOTHYROIDISM: ICD-10-CM

## 2023-06-01 DIAGNOSIS — F33.1 MODERATE EPISODE OF RECURRENT MAJOR DEPRESSIVE DISORDER: Primary | ICD-10-CM

## 2023-06-01 PROCEDURE — 99213 OFFICE O/P EST LOW 20 MIN: CPT | Performed by: NURSE PRACTITIONER

## 2023-06-01 NOTE — PROGRESS NOTES
"Chief Complaint  Depression    Subjective    Four week follow-up for depression.  Had Seroquel increased at previous visit.  Is scheduled with DHARMESH Hernandez on 06/06/2023 for psychological follow-up.  \"I am feeling better.\"         Natividad Montgomery presents to [facility] [place]  Depression  Visit Type: follow-up  Patient presents with the following symptoms: depressed mood, excessive worry, irritability and palpitations.  Patient is not experiencing: confusion, suicidal ideas, suicidal planning and thoughts of death.  Frequency of symptoms: most days   Severity: moderate   Sleep quality: fair  Nighttime awakenings: occasional    Current Outpatient Medications on File Prior to Visit   Medication Sig Dispense Refill    24HR Allergy Relief 180 MG tablet TAKE 1 TABLET BY MOUTH DAILY. (Patient taking differently: Daily As Needed.) 30 tablet 1    celecoxib (CeleBREX) 100 MG capsule TAKE ONE CAPSULE BY MOUTH TWICE DAILY 60 capsule 1    Cholecalciferol (Vitamin D3) 1.25 MG (08690 UT) capsule TAKE ONE CAPSULE BY MOUTH ONCE A WEEK 4 capsule 48    Clenpiq 10-3.5-12 MG-GM -GM/160ML solution Take 160 mL by mouth See Admin Instructions. 320 mL 0    Docusate Sodium (COLACE PO) Take  by mouth Daily.      fluticasone (FLONASE) 50 MCG/ACT nasal spray 2 sprays into the nostril(s) as directed by provider Daily.      hydrocortisone (ANUSOL-HC) 25 MG suppository Insert 1 suppository into the rectum 2 (Two) Times a Day As Needed for Hemorrhoids. 60 suppository 1    hydrOXYzine (ATARAX) 10 MG tablet Take 1 tablet by mouth Every 8 (Eight) Hours As Needed for Anxiety. 90 tablet 3    metoprolol succinate XL (TOPROL-XL) 25 MG 24 hr tablet Take 1 tablet by mouth Daily. 90 tablet 3    omega-3 acid ethyl esters (LOVAZA) 1 g capsule TAKE 1 CAPSULE BY MOUTH DAILY WITH BREAKFAST. 30 capsule 2    omeprazole (priLOSEC) 40 MG capsule TAKE ONE CAPSULE BY MOUTH EVERY DAY 30 capsule 2    " "QUEtiapine (SEROquel) 100 MG tablet Take 1 tablet by mouth Every Night. 30 tablet 3     No current facility-administered medications on file prior to visit.     Allergies   Allergen Reactions    Latex Itching    Sulfa Antibiotics Other (See Comments)     \"Run a high fever\"       Objective   Vital Signs:  /72   Pulse 78   Ht 162.6 cm (64\")   Wt 70.6 kg (155 lb 9.6 oz)   SpO2 98%   BMI 26.71 kg/m²   Estimated body mass index is 26.71 kg/m² as calculated from the following:    Height as of this encounter: 162.6 cm (64\").    Weight as of this encounter: 70.6 kg (155 lb 9.6 oz).             Physical Exam   Result Review :                   Assessment and Plan   Diagnoses and all orders for this visit:    1. Moderate episode of recurrent major depressive disorder (Primary)    2. Screening for hypothyroidism  -     TSH    3. Screening for iron deficiency anemia  -     CBC & Differential    4. Screening for hypertension  -     Comprehensive Metabolic Panel    5. Screening for hypercholesterolemia  -     Lipid panel    Other orders  -     brompheniramine-pseudoephedrine-DM 30-2-10 MG/5ML syrup; Take 5 mL by mouth 4 (Four) Times a Day As Needed for Cough or Allergies.  Dispense: 120 mL; Refill: 2    1.  Moderate episode of recurrent major depressive disorder:  Continue Seroquel as previously prescribed  Continue hydroxyzine as previously prescribed  Continue psychiatric follow-up with DHARMESH Ferrara as scheduled  Seek emergency medical treatment for any new or worsening thoughts of hopelessness, helplessness or self-harm    2.  Troponin EKG as ordered and will notify of results when available    3.  Screening for iron deficiency anemia:  Complete CBC as ordered and will notify of results when available    4.  Screening for hypertension:  Complete chemistry panel as ordered and will notify of results when available    5.  Screening for hypercholesterolemia:  Complete fasting lipid panel as ordered and will " notify of results when available         Follow Up   Return in about 6 months (around 12/1/2023) for Annual physical.  Patient was given instructions and counseling regarding her condition or for health maintenance advice. Please see specific information pulled into the AVS if appropriate.         This document has been electronically signed by DHARMESH Garza on June 19, 2023 09:45 CDT

## 2023-06-02 ENCOUNTER — LAB (OUTPATIENT)
Dept: LAB | Facility: HOSPITAL | Age: 58
End: 2023-06-02
Payer: COMMERCIAL

## 2023-06-02 PROCEDURE — 80061 LIPID PANEL: CPT | Performed by: NURSE PRACTITIONER

## 2023-06-02 PROCEDURE — 80050 GENERAL HEALTH PANEL: CPT | Performed by: NURSE PRACTITIONER

## 2023-06-02 PROCEDURE — 36415 COLL VENOUS BLD VENIPUNCTURE: CPT | Performed by: NURSE PRACTITIONER

## 2023-06-02 RX ORDER — BROMPHENIRAMINE MALEATE, PSEUDOEPHEDRINE HYDROCHLORIDE, AND DEXTROMETHORPHAN HYDROBROMIDE 2; 30; 10 MG/5ML; MG/5ML; MG/5ML
5 SYRUP ORAL 4 TIMES DAILY PRN
Qty: 120 ML | Refills: 2 | Status: SHIPPED | OUTPATIENT
Start: 2023-06-02

## 2023-06-03 LAB
ALBUMIN SERPL-MCNC: 4.5 G/DL (ref 3.5–5.2)
ALBUMIN/GLOB SERPL: 2.3 G/DL
ALP SERPL-CCNC: 77 U/L (ref 39–117)
ALT SERPL W P-5'-P-CCNC: 12 U/L (ref 1–33)
ANION GAP SERPL CALCULATED.3IONS-SCNC: 10.7 MMOL/L (ref 5–15)
AST SERPL-CCNC: 16 U/L (ref 1–32)
BASOPHILS # BLD AUTO: 0.01 10*3/MM3 (ref 0–0.2)
BASOPHILS NFR BLD AUTO: 0.2 % (ref 0–1.5)
BILIRUB SERPL-MCNC: 0.4 MG/DL (ref 0–1.2)
BUN SERPL-MCNC: 17 MG/DL (ref 6–20)
BUN/CREAT SERPL: 24.3 (ref 7–25)
CALCIUM SPEC-SCNC: 9.5 MG/DL (ref 8.6–10.5)
CHLORIDE SERPL-SCNC: 105 MMOL/L (ref 98–107)
CHOLEST SERPL-MCNC: 193 MG/DL (ref 0–200)
CO2 SERPL-SCNC: 26.3 MMOL/L (ref 22–29)
CREAT SERPL-MCNC: 0.7 MG/DL (ref 0.57–1)
DEPRECATED RDW RBC AUTO: 44.2 FL (ref 37–54)
EGFRCR SERPLBLD CKD-EPI 2021: 99.8 ML/MIN/1.73
EOSINOPHIL # BLD AUTO: 0.19 10*3/MM3 (ref 0–0.4)
EOSINOPHIL NFR BLD AUTO: 3.9 % (ref 0.3–6.2)
ERYTHROCYTE [DISTWIDTH] IN BLOOD BY AUTOMATED COUNT: 13.4 % (ref 12.3–15.4)
GLOBULIN UR ELPH-MCNC: 2 GM/DL
GLUCOSE SERPL-MCNC: 86 MG/DL (ref 65–99)
HCT VFR BLD AUTO: 42.2 % (ref 34–46.6)
HDLC SERPL-MCNC: 55 MG/DL (ref 40–60)
HGB BLD-MCNC: 14.5 G/DL (ref 12–15.9)
IMM GRANULOCYTES # BLD AUTO: 0.01 10*3/MM3 (ref 0–0.05)
IMM GRANULOCYTES NFR BLD AUTO: 0.2 % (ref 0–0.5)
LDLC SERPL CALC-MCNC: 119 MG/DL (ref 0–100)
LDLC/HDLC SERPL: 2.13 {RATIO}
LYMPHOCYTES # BLD AUTO: 2.27 10*3/MM3 (ref 0.7–3.1)
LYMPHOCYTES NFR BLD AUTO: 46.3 % (ref 19.6–45.3)
MCH RBC QN AUTO: 31.3 PG (ref 26.6–33)
MCHC RBC AUTO-ENTMCNC: 34.4 G/DL (ref 31.5–35.7)
MCV RBC AUTO: 91.1 FL (ref 79–97)
MONOCYTES # BLD AUTO: 0.24 10*3/MM3 (ref 0.1–0.9)
MONOCYTES NFR BLD AUTO: 4.9 % (ref 5–12)
NEUTROPHILS NFR BLD AUTO: 2.18 10*3/MM3 (ref 1.7–7)
NEUTROPHILS NFR BLD AUTO: 44.5 % (ref 42.7–76)
NRBC BLD AUTO-RTO: 0 /100 WBC (ref 0–0.2)
PLATELET # BLD AUTO: 247 10*3/MM3 (ref 140–450)
PMV BLD AUTO: 10.3 FL (ref 6–12)
POTASSIUM SERPL-SCNC: 3.6 MMOL/L (ref 3.5–5.2)
PROT SERPL-MCNC: 6.5 G/DL (ref 6–8.5)
RBC # BLD AUTO: 4.63 10*6/MM3 (ref 3.77–5.28)
SODIUM SERPL-SCNC: 142 MMOL/L (ref 136–145)
TRIGL SERPL-MCNC: 104 MG/DL (ref 0–150)
TSH SERPL DL<=0.05 MIU/L-ACNC: 4.33 UIU/ML (ref 0.27–4.2)
VLDLC SERPL-MCNC: 19 MG/DL (ref 5–40)
WBC NRBC COR # BLD: 4.9 10*3/MM3 (ref 3.4–10.8)

## 2023-06-05 ENCOUNTER — TELEPHONE (OUTPATIENT)
Dept: FAMILY MEDICINE CLINIC | Facility: CLINIC | Age: 58
End: 2023-06-05
Payer: COMMERCIAL

## 2023-06-05 DIAGNOSIS — R79.89 ELEVATED TSH: Primary | ICD-10-CM

## 2023-06-07 ENCOUNTER — ANESTHESIA EVENT (OUTPATIENT)
Dept: GASTROENTEROLOGY | Facility: HOSPITAL | Age: 58
End: 2023-06-07
Payer: COMMERCIAL

## 2023-06-07 ENCOUNTER — HOSPITAL ENCOUNTER (OUTPATIENT)
Facility: HOSPITAL | Age: 58
Setting detail: HOSPITAL OUTPATIENT SURGERY
Discharge: HOME | End: 2023-06-07
Attending: INTERNAL MEDICINE | Admitting: INTERNAL MEDICINE
Payer: COMMERCIAL

## 2023-06-07 ENCOUNTER — ANESTHESIA (OUTPATIENT)
Dept: GASTROENTEROLOGY | Facility: HOSPITAL | Age: 58
End: 2023-06-07
Payer: COMMERCIAL

## 2023-06-07 VITALS
TEMPERATURE: 97.4 F | DIASTOLIC BLOOD PRESSURE: 64 MMHG | HEART RATE: 68 BPM | WEIGHT: 154 LBS | SYSTOLIC BLOOD PRESSURE: 97 MMHG | RESPIRATION RATE: 18 BRPM | OXYGEN SATURATION: 93 % | HEIGHT: 63 IN | BODY MASS INDEX: 27.29 KG/M2

## 2023-06-07 DIAGNOSIS — Z12.11 ENCOUNTER FOR SCREENING FOR MALIGNANT NEOPLASM OF COLON: ICD-10-CM

## 2023-06-07 PROCEDURE — 25010000002 PROPOFOL 10 MG/ML EMULSION: Performed by: NURSE ANESTHETIST, CERTIFIED REGISTERED

## 2023-06-07 PROCEDURE — 45380 COLONOSCOPY AND BIOPSY: CPT | Performed by: INTERNAL MEDICINE

## 2023-06-07 RX ORDER — ONDANSETRON 2 MG/ML
4 INJECTION INTRAMUSCULAR; INTRAVENOUS ONCE AS NEEDED
Status: DISCONTINUED | OUTPATIENT
Start: 2023-06-07 | End: 2023-06-07 | Stop reason: HOSPADM

## 2023-06-07 RX ORDER — PROPOFOL 10 MG/ML
VIAL (ML) INTRAVENOUS AS NEEDED
Status: DISCONTINUED | OUTPATIENT
Start: 2023-06-07 | End: 2023-06-07 | Stop reason: SURG

## 2023-06-07 RX ORDER — PROMETHAZINE HYDROCHLORIDE 25 MG/1
25 TABLET ORAL ONCE AS NEEDED
Status: DISCONTINUED | OUTPATIENT
Start: 2023-06-07 | End: 2023-06-07 | Stop reason: HOSPADM

## 2023-06-07 RX ORDER — LIDOCAINE HYDROCHLORIDE 20 MG/ML
INJECTION, SOLUTION INTRAVENOUS AS NEEDED
Status: DISCONTINUED | OUTPATIENT
Start: 2023-06-07 | End: 2023-06-07 | Stop reason: SURG

## 2023-06-07 RX ORDER — MEPERIDINE HYDROCHLORIDE 25 MG/ML
12.5 INJECTION INTRAMUSCULAR; INTRAVENOUS; SUBCUTANEOUS
Status: DISCONTINUED | OUTPATIENT
Start: 2023-06-07 | End: 2023-06-07 | Stop reason: HOSPADM

## 2023-06-07 RX ORDER — PROMETHAZINE HYDROCHLORIDE 25 MG/1
25 SUPPOSITORY RECTAL ONCE AS NEEDED
Status: DISCONTINUED | OUTPATIENT
Start: 2023-06-07 | End: 2023-06-07 | Stop reason: HOSPADM

## 2023-06-07 RX ORDER — DEXTROSE AND SODIUM CHLORIDE 5; .45 G/100ML; G/100ML
30 INJECTION, SOLUTION INTRAVENOUS CONTINUOUS PRN
Status: DISCONTINUED | OUTPATIENT
Start: 2023-06-07 | End: 2023-06-07 | Stop reason: HOSPADM

## 2023-06-07 RX ADMIN — PROPOFOL 100 MG: 10 INJECTION, EMULSION INTRAVENOUS at 11:21

## 2023-06-07 RX ADMIN — PROPOFOL 250 MG: 10 INJECTION, EMULSION INTRAVENOUS at 11:32

## 2023-06-07 RX ADMIN — LIDOCAINE HYDROCHLORIDE 50 MG: 20 INJECTION, SOLUTION INTRAVENOUS at 11:21

## 2023-06-07 RX ADMIN — DEXTROSE AND SODIUM CHLORIDE 30 ML/HR: 5; 450 INJECTION, SOLUTION INTRAVENOUS at 10:21

## 2023-06-07 NOTE — ANESTHESIA PREPROCEDURE EVALUATION
Anesthesia Evaluation     Patient summary reviewed and Nursing notes reviewed   history of anesthetic complications (Difficult intubation for .):  PONV difficult airway  NPO Solid Status: > 8 hours  NPO Liquid Status: > 2 hours           Airway   Mallampati: III  TM distance: <3 FB  Neck ROM: full  Anterior and Difficult intubation highly probable  Dental - normal exam     Pulmonary - normal exam    breath sounds clear to auscultation  (+) pulmonary embolism, a smoker Current Smoked day of surgery,  Cardiovascular - normal exam    Beta blocker given within 24 hours of surgery  Rhythm: regular  Rate: normal    (+) hypertensionDVT resolved, hyperlipidemia  (-) murmur, carotid bruits      Neuro/Psych  (+) headaches, psychiatric history Anxiety, Depression and Bipolar  GI/Hepatic/Renal/Endo    (+) obesity, GERD well controlled    Musculoskeletal     Abdominal   (+) obese   Substance History - negative use     OB/GYN negative ob/gyn ROS         Other   arthritis,                     Anesthesia Plan    ASA 3     general and MAC   total IV anesthesia  intravenous induction     Anesthetic plan, risks, benefits, and alternatives have been provided, discussed and informed consent has been obtained with: patient.  Pre-procedure education provided  Plan discussed with CRNA and medical student.

## 2023-06-07 NOTE — H&P
No chief complaint on file.      Subjective    Natividad Montgomery is a 58 y.o. female. she is here today for follow-up.                                                                  Assessment & Plan                                     No diagnosis found.  Plan; schedule patient for screening colonoscopy for surveillance.    Follow-up: No follow-ups on file.     HPI I agree with the current note with no changes in the history.  Risks and benefits discussed with patient. Patient understands these and would like to proceed with procedure.    58-year-old female with concurrent medical history of depression, chronic heartburn, anxiety presents to discuss screening colonoscopy.  Reports she had normal colonoscopy in 2014 and did Cologuard after that but reports she has been history of colon cancer in paternal aunt.  Reports intermittent issues with constipation and hemorrhoids but once she gets her bowel movements regulated with over-the-counter stool softener symptoms have improved she was given Anusol suppository and reports that it has helped hemorrhoids recently.  She denies any GI complaints at this time.      Review of Systems  Review of Systems   Constitutional:  Negative for activity change, appetite change, chills, diaphoresis, fatigue, fever and unexpected weight change.   HENT:  Negative for sore throat and trouble swallowing.    Respiratory:  Negative for shortness of breath.    Gastrointestinal:  Positive for constipation (occ takes colace as needed ). Negative for abdominal distention, abdominal pain, anal bleeding, blood in stool, diarrhea, nausea, rectal pain and vomiting.   Musculoskeletal:  Negative for arthralgias.   Skin:  Negative for pallor.   Neurological:  Negative for light-headedness.     LMP 01/01/1999 (Approximate)   BP 97/64 (Patient Position: Lying)   Pulse 68   Temp  "97.4 °F (36.3 °C) (Temporal)   Resp 18   Ht 160 cm (63\")   Wt 69.9 kg (154 lb)   LMP 1999 (Approximate)   SpO2 93%   BMI 27.28 kg/m²     Objective    .  Physical Exam  Constitutional:       General: She is not in acute distress.     Appearance: Normal appearance. She is normal weight. She is not ill-appearing.   HENT:      Head: Normocephalic and atraumatic.   Abdominal:      General: Abdomen is flat. Bowel sounds are normal. There is no distension.      Palpations: There is no mass.      Tenderness: There is no abdominal tenderness.   Neurological:      Mental Status: She is alert.             The following portions of the patient's history were reviewed and updated as appropriate:   Past Medical History:   Diagnosis Date    Allergic     Anxiety     Arthritis     Bipolar 1 disorder     Breast cyst     Bronchitis     Colon polyp     Depression     DVT (deep venous thrombosis)     Fibrocystic breast     GERD (gastroesophageal reflux disease)     Hard to intubate     Headache     Strep throat     Urinary tract infection      Past Surgical History:   Procedure Laterality Date     SECTION      COLONOSCOPY      EXCISION MASS LEG Right 2018    Procedure: EXCISION MASS RIGHT KNEE                             (LATEX ALLERGY);  Surgeon: Jose David Desai MD;  Location: [facility];  Service: Orthopedics    SHOULDER SURGERY      right shoulder    SUBTOTAL HYSTERECTOMY      TONSILLECTOMY      VAGINAL DILATATION      age 15     Family History   Problem Relation Age of Onset    Heart disease Mother     Hypertension Mother     Lung cancer Father     Hypertension Father     Thyroid disease Sister     Hyperlipidemia Sister     Hypertension Sister     Thyroid disease Brother     Hypertension Brother     Hyperlipidemia Brother     No Known Problems Daughter     Heart failure Maternal Grandmother     Heart attack Maternal Grandfather     Heart disease Paternal Grandmother     No Known Problems " "Paternal Grandfather     Hypertension Sister     Hypertension Sister     Deep vein thrombosis Other     Diabetes Other     Cancer Other     Colon cancer Paternal Aunt      OB History          1    Para   1    Term   1            AB        Living             SAB        IAB        Ectopic        Molar        Multiple        Live Births                  Allergies   Allergen Reactions    Latex Itching    Sulfa Antibiotics Other (See Comments)     \"Run a high fever\"     Social History     Socioeconomic History    Marital status:    Tobacco Use    Smoking status: Every Day     Packs/day: 1.00     Years: 46.00     Pack years: 46.00     Types: Cigarettes    Smokeless tobacco: Never   Vaping Use    Vaping Use: Former   Substance and Sexual Activity    Alcohol use: Yes     Comment: socially    Drug use: Not Currently     Types: Methamphetamines    Sexual activity: Defer     Current Medications:  Prior to Admission medications    Medication Sig Start Date End Date Taking? Authorizing Provider   24HR Allergy Relief 180 MG tablet TAKE 1 TABLET BY MOUTH DAILY. 3/10/23  Yes Lisa Henry APRN   celecoxib (CeleBREX) 100 MG capsule TAKE 1 CAPSULE BY MOUTH 2 (TWO) TIMES A DAY. 3/10/23  Yes Lisa Henry APRN   Cholecalciferol (Vitamin D3) 1.25 MG (05473 UT) capsule TAKE ONE CAPSULE BY MOUTH ONCE A WEEK 22  Yes Lisa Henry APRN   Docusate Sodium (COLACE PO) Take  by mouth Daily.   Yes Provider, MD Tyrell   fluticasone (FLONASE) 50 MCG/ACT nasal spray  20  Yes ProviderTyrell MD   hydrocortisone (ANUSOL-HC) 25 MG suppository Insert 1 suppository into the rectum 2 (Two) Times a Day As Needed for Hemorrhoids. 3/29/23  Yes Lisa Henry APRN   hydrOXYzine (ATARAX) 10 MG tablet Take 1 tablet by mouth Every 8 (Eight) Hours As Needed for Anxiety. 3/29/23  Yes Lisa Henry APRN   metoprolol succinate XL (TOPROL-XL) 25 MG 24 hr tablet Take 1 tablet by mouth Daily. 23  Yes Padmini Ag " D, APRN   omega-3 acid ethyl esters (LOVAZA) 1 g capsule TAKE 1 CAPSULE BY MOUTH DAILY WITH BREAKFAST. 2/10/23  Yes Lisa Henry APRN   QUEtiapine (SEROquel) 50 MG tablet TAKE 1 TABLET BY MOUTH EVERY NIGHT. 2/10/23  Yes Lisa Henry APRN     Orders placed during this encounter include:  No orders of the defined types were placed in this encounter.    COLONOSCOPY (N/A)  No orders of the defined types were placed in this encounter.        Review and/or summary of lab tests, radiology, procedures, medications. Review and summary of old records and obtaining of history. The risks and benefits of my recommendations, as well as other treatment options were discussed . Any questions/concerned were answered. Patient voiced understanding and agreement.          This document has been electronically signed by Judd Maldonado MD on June 7, 2023 10:02 CDT                                               Results for orders placed or performed in visit on 06/01/23   CBC Auto Differential    Specimen: Blood   Result Value Ref Range    WBC 4.90 3.40 - 10.80 10*3/mm3    RBC 4.63 3.77 - 5.28 10*6/mm3    Hemoglobin 14.5 12.0 - 15.9 g/dL    Hematocrit 42.2 34.0 - 46.6 %    MCV 91.1 79.0 - 97.0 fL    MCH 31.3 26.6 - 33.0 pg    MCHC 34.4 31.5 - 35.7 g/dL    RDW 13.4 12.3 - 15.4 %    RDW-SD 44.2 37.0 - 54.0 fl    MPV 10.3 6.0 - 12.0 fL    Platelets 247 140 - 450 10*3/mm3    Neutrophil % 44.5 42.7 - 76.0 %    Lymphocyte % 46.3 (H) 19.6 - 45.3 %    Monocyte % 4.9 (L) 5.0 - 12.0 %    Eosinophil % 3.9 0.3 - 6.2 %    Basophil % 0.2 0.0 - 1.5 %    Immature Grans % 0.2 0.0 - 0.5 %    Neutrophils, Absolute 2.18 1.70 - 7.00 10*3/mm3    Lymphocytes, Absolute 2.27 0.70 - 3.10 10*3/mm3    Monocytes, Absolute 0.24 0.10 - 0.90 10*3/mm3    Eosinophils, Absolute 0.19 0.00 - 0.40 10*3/mm3    Basophils, Absolute 0.01 0.00 - 0.20 10*3/mm3    Immature Grans, Absolute 0.01 0.00 - 0.05 10*3/mm3    nRBC 0.0 0.0 - 0.2 /100 WBC   TSH    Specimen: Blood   Result  Value Ref Range    TSH 4.330 (H) 0.270 - 4.200 uIU/mL   Lipid panel    Specimen: Blood   Result Value Ref Range    Total Cholesterol 193 0 - 200 mg/dL    Triglycerides 104 0 - 150 mg/dL    HDL Cholesterol 55 40 - 60 mg/dL    LDL Cholesterol  119 (H) 0 - 100 mg/dL    VLDL Cholesterol 19 5 - 40 mg/dL    LDL/HDL Ratio 2.13    Comprehensive Metabolic Panel    Specimen: Blood   Result Value Ref Range    Glucose 86 65 - 99 mg/dL    BUN 17 6 - 20 mg/dL    Creatinine 0.70 0.57 - 1.00 mg/dL    Sodium 142 136 - 145 mmol/L    Potassium 3.6 3.5 - 5.2 mmol/L    Chloride 105 98 - 107 mmol/L    CO2 26.3 22.0 - 29.0 mmol/L    Calcium 9.5 8.6 - 10.5 mg/dL    Total Protein 6.5 6.0 - 8.5 g/dL    Albumin 4.5 3.5 - 5.2 g/dL    ALT (SGPT) 12 1 - 33 U/L    AST (SGOT) 16 1 - 32 U/L    Alkaline Phosphatase 77 39 - 117 U/L    Total Bilirubin 0.4 0.0 - 1.2 mg/dL    Globulin 2.0 gm/dL    A/G Ratio 2.3 g/dL    BUN/Creatinine Ratio 24.3 7.0 - 25.0    Anion Gap 10.7 5.0 - 15.0 mmol/L    eGFR 99.8 >60.0 mL/min/1.73   Results for orders placed or performed in visit on 04/07/23   Lyme Disease Total Antibody With Reflex to Immunoassay    Specimen: Blood   Result Value Ref Range    Lyme Total Antibody EIA Negative Negative   Reflexed RMSF, IgG, IFA    Specimen: Blood   Result Value Ref Range    RMSF IgG <1:64 Neg <1:64   [facility] (IgG / M)    Specimen: Blood   Result Value Ref Range    RMSF IgG Equivocal Negative    RMSF IgM 0.39 0.00 - 0.89 index   Ehrlichia Antibody Panel    Specimen: Blood   Result Value Ref Range    E. chaffeensis (HME) IgG Titer Negative Neg:<1:64    E. chaffeensis (HME) IgM Titer Negative Neg:<1:20    HGE IgG Titer Negative Neg:<1:64    HGE IgM Titer Negative Neg:<1:20   Results for orders placed or performed in visit on 12/08/22   Mobile Cardiac Outpatient Telemetry   Result Value Ref Range    Target HR (85%) 138 bpm    Max. Pred. HR (100%) 162 bpm   Results for orders placed or performed in visit on 12/08/22   ECG 12  Lead   Result Value Ref Range    QT Interval 376 ms    QTC Interval 417 ms   Results for orders placed or performed in visit on 10/28/22   CBC Auto Differential    Specimen: Blood   Result Value Ref Range    WBC 5.27 3.40 - 10.80 10*3/mm3    RBC 4.49 3.77 - 5.28 10*6/mm3    Hemoglobin 13.9 12.0 - 15.9 g/dL    Hematocrit 40.6 34.0 - 46.6 %    MCV 90.4 79.0 - 97.0 fL    MCH 31.0 26.6 - 33.0 pg    MCHC 34.2 31.5 - 35.7 g/dL    RDW 12.6 12.3 - 15.4 %    RDW-SD 41.1 37.0 - 54.0 fl    MPV 10.2 6.0 - 12.0 fL    Platelets 288 140 - 450 10*3/mm3    Neutrophil % 55.2 42.7 - 76.0 %    Lymphocyte % 38.7 19.6 - 45.3 %    Monocyte % 3.6 (L) 5.0 - 12.0 %    Eosinophil % 1.9 0.3 - 6.2 %    Basophil % 0.4 0.0 - 1.5 %    Immature Grans % 0.2 0.0 - 0.5 %    Neutrophils, Absolute 2.91 1.70 - 7.00 10*3/mm3    Lymphocytes, Absolute 2.04 0.70 - 3.10 10*3/mm3    Monocytes, Absolute 0.19 0.10 - 0.90 10*3/mm3    Eosinophils, Absolute 0.10 0.00 - 0.40 10*3/mm3    Basophils, Absolute 0.02 0.00 - 0.20 10*3/mm3    Immature Grans, Absolute 0.01 0.00 - 0.05 10*3/mm3    nRBC 0.0 0.0 - 0.2 /100 WBC   TSH    Specimen: Blood   Result Value Ref Range    TSH 2.110 0.270 - 4.200 uIU/mL   Lipid Panel    Specimen: Blood   Result Value Ref Range    Total Cholesterol 199 0 - 200 mg/dL    Triglycerides 91 0 - 150 mg/dL    HDL Cholesterol 57 40 - 60 mg/dL    LDL Cholesterol  126 (H) 0 - 100 mg/dL    VLDL Cholesterol 16 5 - 40 mg/dL    LDL/HDL Ratio 2.17    Comprehensive Metabolic Panel    Specimen: Blood   Result Value Ref Range    Glucose 92 65 - 99 mg/dL    BUN 15 6 - 20 mg/dL    Creatinine 0.73 0.57 - 1.00 mg/dL    Sodium 140 136 - 145 mmol/L    Potassium 4.0 3.5 - 5.2 mmol/L    Chloride 105 98 - 107 mmol/L    CO2 24.3 22.0 - 29.0 mmol/L    Calcium 9.1 8.6 - 10.5 mg/dL    Total Protein 6.8 6.0 - 8.5 g/dL    Albumin 4.10 3.50 - 5.20 g/dL    ALT (SGPT) 10 1 - 33 U/L    AST (SGOT) 18 1 - 32 U/L    Alkaline Phosphatase 76 39 - 117 U/L    Total Bilirubin 0.2 0.0  - 1.2 mg/dL    Globulin 2.7 gm/dL    A/G Ratio 1.5 g/dL    BUN/Creatinine Ratio 20.5 7.0 - 25.0    Anion Gap 10.7 5.0 - 15.0 mmol/L    eGFR 95.5 >60.0 mL/min/1.73     *Note: Due to a large number of results and/or encounters for the requested time period, some results have not been displayed. A complete set of results can be found in Results Review.

## 2023-06-07 NOTE — ANESTHESIA POSTPROCEDURE EVALUATION
Patient: Natividad Montgomery    Procedure Summary       Date: 06/07/23 Room / Location: [facility] ENDOSCOPY 1 / [facility] ENDOSCOPY    Anesthesia Start: 1114 Anesthesia Stop: 1135    Procedure: COLONOSCOPY Diagnosis:       Encounter for screening for malignant neoplasm of colon      (Encounter for screening for malignant neoplasm of colon [Z12.11])    Surgeons: Judd Maldonado MD Provider: Shahid Issa CRNA    Anesthesia Type: general, MAC ASA Status: 3            Anesthesia Type: general, MAC    Vitals  No vitals data found for the desired time range.          Post Anesthesia Care and Evaluation    Patient location during evaluation: PHASE II  Patient participation: waiting for patient participation  Level of consciousness: sleepy but conscious  Pain management: adequate    Airway patency: patent  Anesthetic complications: No anesthetic complications  PONV Status: none  Cardiovascular status: acceptable  Respiratory status: acceptable, spontaneous ventilation and room air  Hydration status: acceptable

## 2023-06-09 LAB — REF LAB TEST METHOD: NORMAL

## 2023-07-28 DIAGNOSIS — K21.9 GASTROESOPHAGEAL REFLUX DISEASE WITHOUT ESOPHAGITIS: ICD-10-CM

## 2023-07-28 RX ORDER — OMEPRAZOLE 40 MG/1
CAPSULE, DELAYED RELEASE ORAL
Qty: 30 CAPSULE | Refills: 2 | Status: SHIPPED | OUTPATIENT
Start: 2023-07-28

## 2023-07-28 RX ORDER — OMEGA-3-ACID ETHYL ESTERS 1 G/1
CAPSULE, LIQUID FILLED ORAL
Qty: 30 CAPSULE | Refills: 2 | Status: SHIPPED | OUTPATIENT
Start: 2023-07-28

## 2023-08-15 ENCOUNTER — OFFICE VISIT (OUTPATIENT)
Dept: ORTHOPEDIC SURGERY | Facility: CLINIC | Age: 58
End: 2023-08-15
Payer: COMMERCIAL

## 2023-08-15 VITALS — BODY MASS INDEX: 27.29 KG/M2 | WEIGHT: 154 LBS | HEIGHT: 63 IN

## 2023-08-15 DIAGNOSIS — Z13.820 SCREENING FOR OSTEOPOROSIS: Primary | ICD-10-CM

## 2023-08-15 RX ORDER — CARIPRAZINE 1.5 MG/1
1.5 CAPSULE, GELATIN COATED ORAL DAILY
COMMUNITY
Start: 2023-08-10

## 2023-08-15 NOTE — PROGRESS NOTES
"Natividad Crystal Montgomery is a 58 y.o. female returns for     Chief Complaint   Patient presents with    Osteoporosis     Bone Health        HISTORY OF PRESENT ILLNESS:      Mrs. Montgomery is a 58-year-old female who presents today for bone health evaluation.  Patient denies fragility fractures.  She reports possible decrease in height over the last several years.  She reports her mother has a history of osteoporosis.  She has never been prescribed steroids on a long-term basis.  She has never had gastric bypass surgery.  She has never had radiation.  She is a current smoker.  She went through menopause at age 52.  She does currently exercise.  She does not currently take vitamin or calcium D supplements.  She had a DEXA scan on 7/5/2023 which showed a T score of -2.4, a major osteoporotic fracture risk of 11%, and a 2.8% risk for hip fracture.       CONCURRENT MEDICAL HISTORY:    The following portions of the patient's history were reviewed and updated as appropriate: allergies, current medications, past family history, past medical history, past social history, past surgical history and problem list.     ROS  No fevers or chills.  No chest pain or shortness of air.  No GI or  disturbances.    PHYSICAL EXAMINATION:       Ht 160 cm (63\")   Wt 69.9 kg (154 lb)   LMP 01/01/1999 (Approximate)   BMI 27.28 kg/mý     Physical Exam  Vitals and nursing note reviewed.   Constitutional:       General: She is not in acute distress.     Appearance: She is well-developed. She is not toxic-appearing.   HENT:      Head: Normocephalic.   Pulmonary:      Effort: Pulmonary effort is normal. No respiratory distress.   Skin:     General: Skin is warm and dry.   Neurological:      Mental Status: She is alert and oriented to person, place, and time.   Psychiatric:         Behavior: Behavior normal.         Thought Content: Thought content normal.         Judgment: Judgment normal.       GAIT:     []  Normal  []  Antalgic    Assistive " device: []  None  []  Walker     []  Crutches  []  Cane     []  Wheelchair  []  Stretcher        No results found.          ASSESSMENT:    Diagnoses and all orders for this visit:    Screening for osteoporosis    Other orders  -     Vraylar 1.5 MG capsule capsule; Take 1 capsule by mouth Daily.          PLAN      DEXA scan results reviewed with patient.  Patient is currently osteopenic with out increased FRAX score or history of fragility fracture.  However patient is only 1/10 of a point away from being considered osteoporotic and only 2/10 away from an elevated hip fracture risk.  After discussing available treatment options including risk, benefits, alternatives of oral bisphosphonates including but not limited to osteonecrosis of the jaw and atypical femur fractures, patient desires to go home and consider her options.  She will call me back and let me know if she desires to start medication.  Recommend repeating DEXA scan in 2 years.  Also explained to patient that should FRAX score increased, T score decrease, or if patient works a bone from a fall from a standing height then these would all be instances that constitute osteoporosis and I would recommend further treatment with a medication such as Prolia, Evenity etc.      Recommend the followin minutes of weightbearing exercise per week.  [id number] mg of calcium per day.  600 to 800 units of vitamin D per day.   Avoidance of smoking and excessive alcohol use.  Repeat dexa in 2 years       Time spent of a minimum of 30 minutes including the face to face evaluation, reviewing of medical history and prior medial records, reviewing of diagnostic studies, ordering additional tests, documentation, patient education and coordination of care.       EMR Dragon/Transciption Disclaimer: Some of this note may be an electronic transcription/translation of spoken language to printed text.  The electronic translation of spoken language may permit erroneous, or at  times, nonsensical words or phrases to be inadvertently transcribed. Although I have reviewed the note for such errors, some may still exist.         This document has been electronically signed by Kira BARROSO on August 15, 2023 14:10 CDT

## 2023-08-23 ENCOUNTER — TELEPHONE (OUTPATIENT)
Dept: ORTHOPEDIC SURGERY | Facility: CLINIC | Age: 58
End: 2023-08-23
Payer: COMMERCIAL

## 2023-08-23 RX ORDER — ALENDRONATE SODIUM 70 MG/1
70 TABLET ORAL
Qty: 4 TABLET | Refills: 2 | Status: SHIPPED | OUTPATIENT
Start: 2023-08-23 | End: 2023-11-21

## 2023-08-23 NOTE — TELEPHONE ENCOUNTER
Patient stated that she would like to go ahead and start medications for her bone health. Pt. Was seen on 08/15/23.     Pharmacy: [facility]      You can call back at [phone]

## 2023-08-23 NOTE — TELEPHONE ENCOUNTER
I have sent in fosamax for patient to take once weekly. Please remind her she must sit upright for 30 minutes after taking medication to prevent worsening gastric reflux. Have her return to see me if she does not tolerate medication well. Thank you.

## 2023-08-25 DIAGNOSIS — G89.29 CHRONIC CERVICAL PAIN: ICD-10-CM

## 2023-08-25 DIAGNOSIS — J30.2 SEASONAL ALLERGIES: ICD-10-CM

## 2023-08-25 DIAGNOSIS — M54.12 CERVICAL RADICULOPATHY: ICD-10-CM

## 2023-08-25 DIAGNOSIS — M54.2 CHRONIC CERVICAL PAIN: ICD-10-CM

## 2023-08-25 DIAGNOSIS — F33.1 MODERATE EPISODE OF RECURRENT MAJOR DEPRESSIVE DISORDER: ICD-10-CM

## 2023-08-25 RX ORDER — QUETIAPINE FUMARATE 100 MG/1
100 TABLET, FILM COATED ORAL NIGHTLY
Qty: 30 TABLET | Refills: 3 | Status: SHIPPED | OUTPATIENT
Start: 2023-08-25

## 2023-08-25 RX ORDER — CELECOXIB 100 MG/1
CAPSULE ORAL
Qty: 60 CAPSULE | Refills: 1 | Status: SHIPPED | OUTPATIENT
Start: 2023-08-25

## 2023-08-25 RX ORDER — FEXOFENADINE HYDROCHLORIDE 180 MG/1
TABLET ORAL
Qty: 30 TABLET | Refills: 1 | Status: SHIPPED | OUTPATIENT
Start: 2023-08-25

## 2023-08-25 NOTE — TELEPHONE ENCOUNTER
UPCOMING APPTS  With Family Medicine (DHARMESH Garza)  12/05/2023 at 10:45 AM  LAST OFFICE VISIT - THIS DEPT  6/1/2023 Lisa eHnry APRN

## 2023-08-28 ENCOUNTER — OFFICE VISIT (OUTPATIENT)
Dept: CARDIOLOGY | Facility: CLINIC | Age: 58
End: 2023-08-28
Payer: COMMERCIAL

## 2023-08-28 VITALS
SYSTOLIC BLOOD PRESSURE: 112 MMHG | OXYGEN SATURATION: 95 % | HEIGHT: 63 IN | WEIGHT: 157.8 LBS | HEART RATE: 58 BPM | BODY MASS INDEX: 27.96 KG/M2 | DIASTOLIC BLOOD PRESSURE: 60 MMHG

## 2023-08-28 DIAGNOSIS — R00.2 PALPITATIONS: Primary | ICD-10-CM

## 2023-08-28 DIAGNOSIS — I49.3 PREMATURE VENTRICULAR COMPLEX: ICD-10-CM

## 2023-08-28 PROCEDURE — 1159F MED LIST DOCD IN RCRD: CPT | Performed by: NURSE PRACTITIONER

## 2023-08-28 PROCEDURE — 1160F RVW MEDS BY RX/DR IN RCRD: CPT | Performed by: NURSE PRACTITIONER

## 2023-08-28 PROCEDURE — 99213 OFFICE O/P EST LOW 20 MIN: CPT | Performed by: NURSE PRACTITIONER

## 2023-08-28 RX ORDER — UREA 10 %
27 LOTION (ML) TOPICAL 2 TIMES DAILY
Qty: 60 TABLET | Refills: 6 | Status: SHIPPED | OUTPATIENT
Start: 2023-08-28
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
## (undated) DEVICE — DRAPE,U/ SHT,SPLIT,PLAS,STERIL: Brand: MEDLINE

## (undated) DEVICE — SUT VIC 2/0 CT1 CR8 18IN J839D

## (undated) DEVICE — SINGLE-USE BIOPSY FORCEPS: Brand: RADIAL JAW 4

## (undated) DEVICE — APPL CHLORAPREP W/TINT 26ML ORNG

## (undated) DEVICE — NDL HYPO SFTY GLD 22G 1 1/2IN

## (undated) DEVICE — BANDAGE,GAUZE,BULKEE II,4.5"X4.1YD,STRL: Brand: MEDLINE

## (undated) DEVICE — PAD UNDERCAST WYTEX 4IN 4YD LF STRL

## (undated) DEVICE — GLV SURG SENSICARE PI PF LF 7 GRN STRL

## (undated) DEVICE — UNDRPD BREATH 23X36 BG/10

## (undated) DEVICE — PK EXTRM LF 60

## (undated) DEVICE — DISPOSABLE TOURNIQUET CUFF SINGLE BLADDER, DUAL PORT AND QUICK CONNECT CONNECTOR: Brand: COLOR CUFF

## (undated) DEVICE — STERILE POLYISOPRENE POWDER-FREE SURGICAL GLOVES WITH EMOLLIENT COATING: Brand: PROTEXIS

## (undated) DEVICE — GOWN,PREVENTION PLUS,XLONG/XLARGE,STRL: Brand: MEDLINE

## (undated) DEVICE — CANN SMPL SOFTECH BIFLO ETCO2 A/M 7FT

## (undated) DEVICE — BNDG ELAS CO-FLEX SLF ADHR 4IN5YD LF STRL

## (undated) DEVICE — 9165 UNIVERSAL PATIENT PLATE: Brand: 3M™

## (undated) DEVICE — SUT VICRYL O M0-4 27IN ETHCPP71D

## (undated) DEVICE — GLV SURG SENSICARE GREEN W/ALOE PF LF 6.5 STRL

## (undated) DEVICE — DRSNG GZ CURAD XEROFORM NONADHS 5X9IN STRL

## (undated) DEVICE — GAUZE,SPONGE,FLUFF,6"X6.75",STRL,5/TRAY: Brand: MEDLINE

## (undated) DEVICE — SYR LL TP 10ML STRL

## (undated) DEVICE — SOL IRR NACL 0.9PCT BT 1000ML

## (undated) DEVICE — SUT ETHLN 3-0 FS118IN 663H